# Patient Record
Sex: MALE | Race: WHITE | NOT HISPANIC OR LATINO | Employment: OTHER | URBAN - METROPOLITAN AREA
[De-identification: names, ages, dates, MRNs, and addresses within clinical notes are randomized per-mention and may not be internally consistent; named-entity substitution may affect disease eponyms.]

---

## 2017-05-12 ENCOUNTER — HOSPITAL ENCOUNTER (OUTPATIENT)
Dept: NEUROLOGY | Facility: HOSPITAL | Age: 72
Discharge: HOME | End: 2017-05-12
Attending: NURSE PRACTITIONER | Admitting: NURSE PRACTITIONER

## 2017-05-12 ENCOUNTER — OUTSIDE FACILITY SERVICE (OUTPATIENT)
Dept: NEUROLOGY | Facility: CLINIC | Age: 72
End: 2017-05-12

## 2017-05-12 PROCEDURE — 95886 MUSC TEST DONE W/N TEST COMP: CPT | Performed by: PSYCHIATRY & NEUROLOGY

## 2017-05-12 PROCEDURE — 95910 NRV CNDJ TEST 7-8 STUDIES: CPT | Performed by: PSYCHIATRY & NEUROLOGY

## 2017-11-01 ENCOUNTER — HOSPITAL ENCOUNTER (OUTPATIENT)
Dept: GENERAL RADIOLOGY | Facility: HOSPITAL | Age: 72
Discharge: HOME | End: 2017-11-01
Attending: INTERNAL MEDICINE | Admitting: INTERNAL MEDICINE

## 2019-01-01 ENCOUNTER — TELEPHONE (OUTPATIENT)
Dept: RHEUMATOLOGY | Facility: CLINIC | Age: 74
End: 2019-01-01

## 2019-01-01 ENCOUNTER — HOSPITAL ENCOUNTER (OUTPATIENT)
Dept: BONE DENSITY | Facility: HOSPITAL | Age: 74
End: 2019-01-01

## 2019-01-01 ENCOUNTER — HOSPITAL ENCOUNTER (OUTPATIENT)
Dept: BONE DENSITY | Facility: HOSPITAL | Age: 74
Discharge: HOME | End: 2019-11-27
Admitting: NURSE PRACTITIONER

## 2019-01-01 ENCOUNTER — LAB (OUTPATIENT)
Dept: LAB | Facility: HOSPITAL | Age: 74
End: 2019-01-01

## 2019-01-01 ENCOUNTER — CLINICAL SUPPORT NO REQUIREMENTS (OUTPATIENT)
Dept: CARDIOLOGY | Facility: CLINIC | Age: 74
End: 2019-01-01

## 2019-01-01 ENCOUNTER — OFFICE VISIT (OUTPATIENT)
Dept: CARDIOLOGY | Facility: CLINIC | Age: 74
End: 2019-01-01

## 2019-01-01 ENCOUNTER — OFFICE VISIT (OUTPATIENT)
Dept: RHEUMATOLOGY | Facility: CLINIC | Age: 74
End: 2019-01-01

## 2019-01-01 VITALS
OXYGEN SATURATION: 92 % | DIASTOLIC BLOOD PRESSURE: 59 MMHG | BODY MASS INDEX: 43.95 KG/M2 | HEIGHT: 68 IN | HEART RATE: 83 BPM | SYSTOLIC BLOOD PRESSURE: 118 MMHG | WEIGHT: 290 LBS

## 2019-01-01 VITALS
HEIGHT: 68 IN | WEIGHT: 284 LBS | HEART RATE: 80 BPM | SYSTOLIC BLOOD PRESSURE: 143 MMHG | BODY MASS INDEX: 43.04 KG/M2 | DIASTOLIC BLOOD PRESSURE: 88 MMHG

## 2019-01-01 DIAGNOSIS — I25.10 CORONARY ARTERY DISEASE INVOLVING NATIVE CORONARY ARTERY OF NATIVE HEART WITHOUT ANGINA PECTORIS: ICD-10-CM

## 2019-01-01 DIAGNOSIS — M19.90 INFLAMMATORY ARTHRITIS: ICD-10-CM

## 2019-01-01 DIAGNOSIS — N18.9 CHRONIC KIDNEY DISEASE, UNSPECIFIED CKD STAGE: ICD-10-CM

## 2019-01-01 DIAGNOSIS — Z95.0 PACEMAKER: ICD-10-CM

## 2019-01-01 DIAGNOSIS — I10 ESSENTIAL HYPERTENSION: ICD-10-CM

## 2019-01-01 DIAGNOSIS — M35.00 SJOGREN'S SYNDROME WITHOUT EXTRAGLANDULAR INVOLVEMENT (HCC): ICD-10-CM

## 2019-01-01 DIAGNOSIS — E78.2 MIXED HYPERLIPIDEMIA: ICD-10-CM

## 2019-01-01 DIAGNOSIS — Z13.820 SCREENING FOR OSTEOPOROSIS: ICD-10-CM

## 2019-01-01 DIAGNOSIS — Z95.0 PRESENCE OF CARDIAC PACEMAKER: ICD-10-CM

## 2019-01-01 DIAGNOSIS — I48.0 PAROXYSMAL ATRIAL FIBRILLATION (HCC): Primary | ICD-10-CM

## 2019-01-01 DIAGNOSIS — R60.9 FLUID RETENTION: ICD-10-CM

## 2019-01-01 DIAGNOSIS — E11.9 TYPE 2 DIABETES MELLITUS WITHOUT COMPLICATION, WITH LONG-TERM CURRENT USE OF INSULIN (HCC): ICD-10-CM

## 2019-01-01 DIAGNOSIS — M19.90 INFLAMMATORY ARTHRITIS: Primary | ICD-10-CM

## 2019-01-01 DIAGNOSIS — Z79.51 LONG TERM CURRENT USE OF INHALED STEROID: ICD-10-CM

## 2019-01-01 DIAGNOSIS — N28.9 RENAL INSUFFICIENCY: ICD-10-CM

## 2019-01-01 DIAGNOSIS — I49.5 SICK SINUS SYNDROME (HCC): ICD-10-CM

## 2019-01-01 DIAGNOSIS — Z79.4 TYPE 2 DIABETES MELLITUS WITHOUT COMPLICATION, WITH LONG-TERM CURRENT USE OF INSULIN (HCC): ICD-10-CM

## 2019-01-01 LAB
25(OH)D3 SERPL-MCNC: 56.2 NG/ML (ref 30–100)
ALBUMIN SERPL-MCNC: 3.9 G/DL (ref 3.5–5.2)
ALBUMIN/GLOB SERPL: 1.3 G/DL
ALP SERPL-CCNC: 46 U/L (ref 39–117)
ALT SERPL W P-5'-P-CCNC: 8 U/L (ref 1–41)
ANION GAP SERPL CALCULATED.3IONS-SCNC: 12.7 MMOL/L (ref 5–15)
AST SERPL-CCNC: 12 U/L (ref 1–40)
BASOPHILS # BLD AUTO: 0.01 10*3/MM3 (ref 0–0.2)
BASOPHILS NFR BLD AUTO: 0.2 % (ref 0–1.5)
BILIRUB SERPL-MCNC: 0.3 MG/DL (ref 0.2–1.2)
BILIRUB UR QL STRIP: NEGATIVE
BUN BLD-MCNC: 32 MG/DL (ref 8–23)
BUN/CREAT SERPL: 16.3 (ref 7–25)
C3 SERPL-MCNC: 129 MG/DL (ref 82–167)
C4 SERPL-MCNC: 25 MG/DL (ref 14–44)
CALCIUM SPEC-SCNC: 9.2 MG/DL (ref 8.6–10.5)
CCP IGA+IGG SERPL IA-ACNC: 7 UNITS (ref 0–19)
CHLORIDE SERPL-SCNC: 93 MMOL/L (ref 98–107)
CHROMATIN AB SERPL-ACNC: <10 IU/ML (ref 0–14)
CLARITY UR: CLEAR
CO2 SERPL-SCNC: 32.3 MMOL/L (ref 22–29)
COLOR UR: YELLOW
CREAT BLD-MCNC: 1.96 MG/DL (ref 0.76–1.27)
CRP SERPL-MCNC: 5.96 MG/DL (ref 0–0.5)
DEPRECATED RDW RBC AUTO: 47.2 FL (ref 37–54)
EOSINOPHIL # BLD AUTO: 0.13 10*3/MM3 (ref 0–0.4)
EOSINOPHIL NFR BLD AUTO: 3.1 % (ref 0.3–6.2)
ERYTHROCYTE [DISTWIDTH] IN BLOOD BY AUTOMATED COUNT: 13.8 % (ref 12.3–15.4)
ERYTHROCYTE [SEDIMENTATION RATE] IN BLOOD: 38 MM/HR (ref 0–20)
GFR SERPL CREATININE-BSD FRML MDRD: 34 ML/MIN/1.73
GLOBULIN UR ELPH-MCNC: 3.1 GM/DL
GLUCOSE BLD-MCNC: 600 MG/DL (ref 65–99)
GLUCOSE UR STRIP-MCNC: ABNORMAL MG/DL
HCT VFR BLD AUTO: 35.2 % (ref 37.5–51)
HGB BLD-MCNC: 10.7 G/DL (ref 13–17.7)
HGB UR QL STRIP.AUTO: NEGATIVE
IMM GRANULOCYTES # BLD AUTO: 0.01 10*3/MM3 (ref 0–0.05)
IMM GRANULOCYTES NFR BLD AUTO: 0.2 % (ref 0–0.5)
KETONES UR QL STRIP: NEGATIVE
LEUKOCYTE ESTERASE UR QL STRIP.AUTO: NEGATIVE
LYMPHOCYTES # BLD AUTO: 0.81 10*3/MM3 (ref 0.7–3.1)
LYMPHOCYTES NFR BLD AUTO: 19.1 % (ref 19.6–45.3)
MCH RBC QN AUTO: 28.4 PG (ref 26.6–33)
MCHC RBC AUTO-ENTMCNC: 30.4 G/DL (ref 31.5–35.7)
MCV RBC AUTO: 93.4 FL (ref 79–97)
MONOCYTES # BLD AUTO: 0.22 10*3/MM3 (ref 0.1–0.9)
MONOCYTES NFR BLD AUTO: 5.2 % (ref 5–12)
NEUTROPHILS # BLD AUTO: 3.05 10*3/MM3 (ref 1.7–7)
NEUTROPHILS NFR BLD AUTO: 72.2 % (ref 42.7–76)
NITRITE UR QL STRIP: NEGATIVE
NRBC BLD AUTO-RTO: 0 /100 WBC (ref 0–0.2)
PH UR STRIP.AUTO: 5.5 [PH] (ref 5–8)
PLATELET # BLD AUTO: 152 10*3/MM3 (ref 140–450)
PMV BLD AUTO: 10.5 FL (ref 6–12)
POTASSIUM BLD-SCNC: 4 MMOL/L (ref 3.5–5.2)
PROT SERPL-MCNC: 7 G/DL (ref 6–8.5)
PROT UR QL STRIP: NEGATIVE
RBC # BLD AUTO: 3.77 10*6/MM3 (ref 4.14–5.8)
SODIUM BLD-SCNC: 138 MMOL/L (ref 136–145)
SP GR UR STRIP: 1.02 (ref 1–1.03)
UROBILINOGEN UR QL STRIP: ABNORMAL
WBC NRBC COR # BLD: 4.23 10*3/MM3 (ref 3.4–10.8)

## 2019-01-01 PROCEDURE — 85025 COMPLETE CBC W/AUTO DIFF WBC: CPT

## 2019-01-01 PROCEDURE — 86200 CCP ANTIBODY: CPT

## 2019-01-01 PROCEDURE — 77080 DXA BONE DENSITY AXIAL: CPT

## 2019-01-01 PROCEDURE — 80053 COMPREHEN METABOLIC PANEL: CPT

## 2019-01-01 PROCEDURE — 93294 REM INTERROG EVL PM/LDLS PM: CPT | Performed by: INTERNAL MEDICINE

## 2019-01-01 PROCEDURE — 36415 COLL VENOUS BLD VENIPUNCTURE: CPT

## 2019-01-01 PROCEDURE — 86160 COMPLEMENT ANTIGEN: CPT

## 2019-01-01 PROCEDURE — 81003 URINALYSIS AUTO W/O SCOPE: CPT

## 2019-01-01 PROCEDURE — 86431 RHEUMATOID FACTOR QUANT: CPT

## 2019-01-01 PROCEDURE — 99214 OFFICE O/P EST MOD 30 MIN: CPT | Performed by: NURSE PRACTITIONER

## 2019-01-01 PROCEDURE — 85652 RBC SED RATE AUTOMATED: CPT

## 2019-01-01 PROCEDURE — 99214 OFFICE O/P EST MOD 30 MIN: CPT | Performed by: INTERNAL MEDICINE

## 2019-01-01 PROCEDURE — 86140 C-REACTIVE PROTEIN: CPT

## 2019-01-01 PROCEDURE — 93296 REM INTERROG EVL PM/IDS: CPT | Performed by: INTERNAL MEDICINE

## 2019-01-01 PROCEDURE — 82306 VITAMIN D 25 HYDROXY: CPT

## 2019-01-01 RX ORDER — OMEPRAZOLE 40 MG/1
40 CAPSULE, DELAYED RELEASE ORAL DAILY
COMMUNITY
Start: 2019-01-01

## 2019-01-01 RX ORDER — METOLAZONE 2.5 MG/1
2.5 TABLET ORAL DAILY
Refills: 0 | COMMUNITY
Start: 2019-11-05 | End: 2020-01-01

## 2019-07-19 PROBLEM — Z95.0 PRESENCE OF CARDIAC PACEMAKER: Status: ACTIVE | Noted: 2017-08-10

## 2019-07-19 PROBLEM — M81.0 OSTEOPOROSIS: Status: ACTIVE | Noted: 2017-09-27

## 2019-07-19 PROBLEM — D64.9 ANEMIA: Status: ACTIVE | Noted: 2019-01-21

## 2019-07-19 PROBLEM — R20.0 NUMBNESS OF EXTREMITY: Status: ACTIVE | Noted: 2017-03-27

## 2019-07-19 PROBLEM — I25.10 CORONARY ARTERY DISEASE: Status: ACTIVE | Noted: 2019-07-19

## 2019-07-19 PROBLEM — N18.9 CHRONIC KIDNEY DISEASE, UNSPECIFIED: Status: ACTIVE | Noted: 2017-05-30

## 2019-07-19 PROBLEM — M1A.9XX0 CHRONIC GOUT: Status: ACTIVE | Noted: 2018-07-25

## 2019-07-19 PROBLEM — M19.049 OSTEOARTHRITIS OF HAND: Status: ACTIVE | Noted: 2018-07-25

## 2019-07-19 PROBLEM — I10 HYPERTENSION: Status: ACTIVE | Noted: 2019-07-19

## 2019-07-19 RX ORDER — TOPIRAMATE 25 MG/1
TABLET ORAL EVERY 12 HOURS
COMMUNITY
Start: 2017-10-26 | End: 2020-01-01

## 2019-07-19 RX ORDER — ACETAMINOPHEN 500 MG
1 TABLET ORAL DAILY
COMMUNITY
Start: 2012-01-30

## 2019-07-19 RX ORDER — FOLIC ACID 1 MG/1
1 TABLET ORAL DAILY
COMMUNITY
Start: 2013-05-17

## 2019-07-19 RX ORDER — MULTIVITAMIN WITH IRON
100 TABLET ORAL DAILY
COMMUNITY
Start: 2017-08-10

## 2019-07-19 RX ORDER — ALBUTEROL SULFATE 90 UG/1
2 AEROSOL, METERED RESPIRATORY (INHALATION) EVERY 4 HOURS PRN
COMMUNITY
Start: 2015-12-01

## 2019-07-19 RX ORDER — BACLOFEN 10 MG/1
TABLET ORAL
COMMUNITY
Start: 2012-02-09 | End: 2020-01-01

## 2019-07-19 RX ORDER — HYDROXYCHLOROQUINE SULFATE 200 MG/1
TABLET, FILM COATED ORAL EVERY 12 HOURS
COMMUNITY
Start: 2018-03-23 | End: 2019-10-16 | Stop reason: SDUPTHER

## 2019-07-19 RX ORDER — HYDRALAZINE HYDROCHLORIDE 50 MG/1
50 TABLET, FILM COATED ORAL 3 TIMES DAILY
COMMUNITY
Start: 2016-04-29 | End: 2020-01-01 | Stop reason: HOSPADM

## 2019-07-19 RX ORDER — DOCUSATE SODIUM 100 MG/1
100 CAPSULE, LIQUID FILLED ORAL 2 TIMES DAILY PRN
COMMUNITY
Start: 2017-08-10

## 2019-07-19 RX ORDER — CALCITRIOL 0.25 UG/1
0.25 CAPSULE, LIQUID FILLED ORAL DAILY
COMMUNITY
Start: 2017-08-10 | End: 2020-01-01

## 2019-07-19 RX ORDER — PRAVASTATIN SODIUM 80 MG/1
TABLET ORAL EVERY 24 HOURS
COMMUNITY
Start: 2018-04-25 | End: 2019-08-12 | Stop reason: SDUPTHER

## 2019-07-19 RX ORDER — BLOOD SUGAR DIAGNOSTIC
STRIP MISCELLANEOUS
COMMUNITY
Start: 2014-04-22 | End: 2020-01-01

## 2019-07-19 RX ORDER — INSULIN GLARGINE 100 [IU]/ML
80 INJECTION, SOLUTION SUBCUTANEOUS EVERY MORNING
Status: ON HOLD | COMMUNITY
Start: 2013-05-06 | End: 2020-01-01 | Stop reason: SDUPTHER

## 2019-07-19 RX ORDER — HYDROCODONE BITARTRATE AND ACETAMINOPHEN 10; 325 MG/1; MG/1
TABLET ORAL
COMMUNITY
Start: 2015-04-03 | End: 2020-01-01

## 2019-07-19 RX ORDER — LISINOPRIL 20 MG/1
TABLET ORAL EVERY 12 HOURS
COMMUNITY
Start: 2017-08-29 | End: 2020-01-01

## 2019-07-19 RX ORDER — POTASSIUM CHLORIDE 750 MG/1
TABLET, FILM COATED, EXTENDED RELEASE ORAL
COMMUNITY
Start: 2012-11-26 | End: 2020-01-01

## 2019-07-19 RX ORDER — LEVOTHYROXINE SODIUM 0.05 MG/1
50 TABLET ORAL DAILY
COMMUNITY
Start: 2012-02-09

## 2019-07-19 RX ORDER — CYCLOSPORINE 0.5 MG/ML
EMULSION OPHTHALMIC
COMMUNITY
Start: 2013-10-01 | End: 2020-01-01

## 2019-07-19 RX ORDER — FLUOXETINE 10 MG/1
20 CAPSULE ORAL DAILY
COMMUNITY
Start: 2011-12-27

## 2019-07-19 RX ORDER — TAMSULOSIN HYDROCHLORIDE 0.4 MG/1
1 CAPSULE ORAL DAILY
COMMUNITY
Start: 2012-08-02

## 2019-07-19 RX ORDER — GABAPENTIN 400 MG/1
400 CAPSULE ORAL 2 TIMES DAILY
COMMUNITY
Start: 2012-06-25

## 2019-07-19 RX ORDER — DIAZEPAM 5 MG/1
5 TABLET ORAL EVERY 8 HOURS PRN
COMMUNITY
Start: 2014-04-22

## 2019-07-19 RX ORDER — BUMETANIDE 2 MG/1
TABLET ORAL
COMMUNITY
Start: 2013-05-03 | End: 2020-01-01

## 2019-07-22 ENCOUNTER — OFFICE VISIT (OUTPATIENT)
Dept: RHEUMATOLOGY | Facility: CLINIC | Age: 74
End: 2019-07-22

## 2019-07-22 ENCOUNTER — LAB (OUTPATIENT)
Dept: LAB | Facility: HOSPITAL | Age: 74
End: 2019-07-22

## 2019-07-22 VITALS
DIASTOLIC BLOOD PRESSURE: 80 MMHG | SYSTOLIC BLOOD PRESSURE: 136 MMHG | WEIGHT: 280 LBS | HEIGHT: 68 IN | HEART RATE: 73 BPM | BODY MASS INDEX: 42.44 KG/M2

## 2019-07-22 DIAGNOSIS — M19.90 INFLAMMATORY ARTHRITIS: Primary | ICD-10-CM

## 2019-07-22 DIAGNOSIS — M19.90 INFLAMMATORY ARTHRITIS: ICD-10-CM

## 2019-07-22 DIAGNOSIS — N18.9 CHRONIC KIDNEY DISEASE, UNSPECIFIED CKD STAGE: ICD-10-CM

## 2019-07-22 DIAGNOSIS — M35.00 SJOGREN'S SYNDROME WITHOUT EXTRAGLANDULAR INVOLVEMENT (HCC): ICD-10-CM

## 2019-07-22 LAB
25(OH)D3 SERPL-MCNC: 89.1 NG/ML (ref 30–100)
ALBUMIN SERPL-MCNC: 3.3 G/DL (ref 3.5–4.8)
ALBUMIN/GLOB SERPL: 1.3 G/DL (ref 1–1.7)
ALP SERPL-CCNC: 31 U/L (ref 32–91)
ALT SERPL W P-5'-P-CCNC: 12 U/L (ref 17–63)
ANION GAP SERPL CALCULATED.3IONS-SCNC: 12.6 MMOL/L (ref 5–15)
AST SERPL-CCNC: 17 U/L (ref 15–41)
BASOPHILS # BLD AUTO: 0 10*3/MM3 (ref 0–0.2)
BASOPHILS NFR BLD AUTO: 0.7 % (ref 0–1.5)
BILIRUB SERPL-MCNC: 0.5 MG/DL (ref 0.3–1.2)
BUN BLD-MCNC: 14 MG/DL (ref 8–20)
BUN/CREAT SERPL: 10.8 (ref 6.2–20.3)
CALCIUM SPEC-SCNC: 8.8 MG/DL (ref 8.9–10.3)
CHLORIDE SERPL-SCNC: 103 MMOL/L (ref 101–111)
CO2 SERPL-SCNC: 28 MMOL/L (ref 22–32)
CREAT BLD-MCNC: 1.3 MG/DL (ref 0.7–1.2)
CRP SERPL-MCNC: 2.73 MG/DL (ref 0–0.7)
DEPRECATED RDW RBC AUTO: 48.1 FL (ref 37–54)
EOSINOPHIL # BLD AUTO: 0.2 10*3/MM3 (ref 0–0.4)
EOSINOPHIL NFR BLD AUTO: 3.4 % (ref 0.3–6.2)
ERYTHROCYTE [DISTWIDTH] IN BLOOD BY AUTOMATED COUNT: 15 % (ref 12.3–15.4)
ERYTHROCYTE [SEDIMENTATION RATE] IN BLOOD: 55 MM/HR (ref 0–20)
GFR SERPL CREATININE-BSD FRML MDRD: 54 ML/MIN/1.73
GLOBULIN UR ELPH-MCNC: 2.6 GM/DL (ref 2.5–3.8)
GLUCOSE BLD-MCNC: 90 MG/DL (ref 65–99)
HCT VFR BLD AUTO: 31.6 % (ref 37.5–51)
HGB BLD-MCNC: 10.5 G/DL (ref 13–17.7)
LYMPHOCYTES # BLD AUTO: 1.1 10*3/MM3 (ref 0.7–3.1)
LYMPHOCYTES NFR BLD AUTO: 21.7 % (ref 19.6–45.3)
MCH RBC QN AUTO: 29.8 PG (ref 26.6–33)
MCHC RBC AUTO-ENTMCNC: 33.2 G/DL (ref 31.5–35.7)
MCV RBC AUTO: 89.7 FL (ref 79–97)
MONOCYTES # BLD AUTO: 0.3 10*3/MM3 (ref 0.1–0.9)
MONOCYTES NFR BLD AUTO: 6.4 % (ref 5–12)
NEUTROPHILS # BLD AUTO: 3.5 10*3/MM3 (ref 1.7–7)
NEUTROPHILS NFR BLD AUTO: 67.8 % (ref 42.7–76)
PLATELET # BLD AUTO: 201 10*3/MM3 (ref 140–450)
PMV BLD AUTO: 7.5 FL (ref 6–12)
POTASSIUM BLD-SCNC: 3.6 MMOL/L (ref 3.6–5.1)
PROT SERPL-MCNC: 5.9 G/DL (ref 6.1–7.9)
RBC # BLD AUTO: 3.53 10*6/MM3 (ref 4.14–5.8)
SODIUM BLD-SCNC: 140 MMOL/L (ref 136–144)
WBC NRBC COR # BLD: 5.2 10*3/MM3 (ref 3.4–10.8)

## 2019-07-22 PROCEDURE — 36415 COLL VENOUS BLD VENIPUNCTURE: CPT

## 2019-07-22 PROCEDURE — 85027 COMPLETE CBC AUTOMATED: CPT | Performed by: NURSE PRACTITIONER

## 2019-07-22 PROCEDURE — 82306 VITAMIN D 25 HYDROXY: CPT | Performed by: NURSE PRACTITIONER

## 2019-07-22 PROCEDURE — 84165 PROTEIN E-PHORESIS SERUM: CPT | Performed by: NURSE PRACTITIONER

## 2019-07-22 PROCEDURE — 99213 OFFICE O/P EST LOW 20 MIN: CPT | Performed by: NURSE PRACTITIONER

## 2019-07-22 PROCEDURE — 85652 RBC SED RATE AUTOMATED: CPT | Performed by: NURSE PRACTITIONER

## 2019-07-22 PROCEDURE — 80053 COMPREHEN METABOLIC PANEL: CPT | Performed by: NURSE PRACTITIONER

## 2019-07-22 PROCEDURE — 86140 C-REACTIVE PROTEIN: CPT | Performed by: NURSE PRACTITIONER

## 2019-07-22 RX ORDER — MONTELUKAST SODIUM 10 MG/1
10 TABLET ORAL EVERY EVENING
Refills: 6 | COMMUNITY
Start: 2019-07-06 | End: 2020-01-01

## 2019-07-22 RX ORDER — AZELASTINE HYDROCHLORIDE 0.5 MG/ML
SOLUTION/ DROPS OPHTHALMIC
Refills: 11 | COMMUNITY
Start: 2019-06-23 | End: 2020-01-01

## 2019-07-22 RX ORDER — HYDROCODONE BITARTRATE AND ACETAMINOPHEN 7.5; 325 MG/1; MG/1
1 TABLET ORAL EVERY 6 HOURS PRN
Refills: 0 | COMMUNITY
Start: 2019-07-08 | End: 2020-01-01

## 2019-07-22 RX ORDER — PYRIDOSTIGMINE BROMIDE 60 MG/1
30 TABLET ORAL 2 TIMES DAILY
Refills: 3 | COMMUNITY
Start: 2019-06-27 | End: 2020-01-01 | Stop reason: HOSPADM

## 2019-07-22 RX ORDER — ERGOCALCIFEROL 1.25 MG/1
50000 CAPSULE ORAL
COMMUNITY
Start: 2019-07-21

## 2019-07-22 NOTE — PATIENT INSTRUCTIONS
Labs today  Hand xray  Continue plaquenil, Discussed the importance of eye examination with plaquenil use. Pt verbalizes understanding.   RTO 4 months

## 2019-07-22 NOTE — PROGRESS NOTES
"Subjective   Zack Warner is a 74 y.o. male.     History of Present Illness   Pt is a 74 y.o. Male with history of inflammatory arthritis,Sjogrens, carpal tunnel syndrome, chronic kidney disease, diabetes and anemia. He was last seen in the office by Dr. Jones in January 2019.  Labs in January showed elevated glucose at 346, creatinine 1.8 (0.6-1.3), Hgb at 11.4.    Normal dexa scan on 11/2017.    Pt is currently taking plaquenil 400 mg/d and is up to date on eye exam. For pain control he takes hydrocodone and gabapentin. Stiffness lasts \"all day\" his hands ache, especially over the knuckles. His ankles swell and he takes a diuretic as needed for this.     ROS: denies fever. +chills intermittently. +dry eyes and uses gtts as needed. Denies sores in the mouth or nose. Denies N/V/D. Will have intermittent CP and SOA; pt has CAD and is followed by cardiology. Denies skin rashes or PS. Denies DAVID, jaw pain or blurred vision. He does have frontal headache and reports that he was hospitalized for this in April and was told the headache was related to his diabetes.  The remainder of the review of systems reviewed and are (-).     The following portions of the patient's history were reviewed and updated as appropriate: allergies, current medications, past family history, past medical history, past social history, past surgical history and problem list.    Review of Systems   Constitutional:        See HPI       Objective   Physical Exam   Constitutional: He is oriented to person, place, and time. He appears well-developed and well-nourished.   Pt uses walker with ambulation.   HENT:   Head: Normocephalic and atraumatic.   Nose: Nose normal.   Eyes: EOM are normal. Pupils are equal, round, and reactive to light.   Neck:   No bruits   Cardiovascular: Normal rate.   Pt has pacemaker   Pulmonary/Chest: Effort normal and breath sounds normal.   Musculoskeletal:   Decreased ROM of the nila shoulders, lumbar spine, nila MCPs  Pt " has tenderness over the lumbar spine, bilateral MCPs, PIPs  Swelling is noted over the bilateral ankles.   Neurological: He is alert and oriented to person, place, and time.   Skin: Skin is warm and dry.         Assessment/Plan   Zack was seen today for joint pain.    Diagnoses and all orders for this visit:    Inflammatory arthritis  Comments:  Continue plaquenil 400 mg/d  Labs today  Hand xray today  Orders:  -     CBC With Manual Differential; Future  -     Comprehensive Metabolic Panel; Future  -     C-reactive Protein; Future  -     Sedimentation Rate; Future  -     Protein Elec + Interp, Serum; Future  -     Vitamin D 25 Hydroxy; Future  -     XR Hand 2 View Bilateral    Sjogren's syndrome without extraglandular involvement (CMS/HCC)  Comments:  Continue with eye gtts as needed    Chronic kidney disease, unspecified CKD stage  Comments:  Pt is under the care of nephrologist: Dr. Andrade  Orders:  -     CBC With Manual Differential; Future  -     Comprehensive Metabolic Panel; Future  -     C-reactive Protein; Future  -     Sedimentation Rate; Future  -     Protein Elec + Interp, Serum; Future  -     Vitamin D 25 Hydroxy; Future  -     XR Hand 2 View Bilateral        Patient Instructions   Labs today  Hand xray  Continue plaquenil, Discussed the importance of eye examination with plaquenil use. Pt verbalizes understanding.   RTO 4 months

## 2019-07-23 LAB
ALBUMIN SERPL-MCNC: 3.1 G/DL (ref 2.9–4.4)
ALBUMIN/GLOB SERPL: 1.1 {RATIO} (ref 0.7–1.7)
ALPHA1 GLOB FLD ELPH-MCNC: 0.3 G/DL (ref 0–0.4)
ALPHA2 GLOB SERPL ELPH-MCNC: 0.8 G/DL (ref 0.4–1)
B-GLOBULIN SERPL ELPH-MCNC: 1 G/DL (ref 0.7–1.3)
GAMMA GLOB SERPL ELPH-MCNC: 0.6 G/DL (ref 0.4–1.8)
GLOBULIN SER CALC-MCNC: 2.7 G/DL (ref 2.2–3.9)
Lab: ABNORMAL
M-SPIKE: ABNORMAL G/DL
PROT PATTERN SERPL ELPH-IMP: ABNORMAL
PROT SERPL-MCNC: 5.8 G/DL (ref 6–8.5)

## 2019-08-12 RX ORDER — PRAVASTATIN SODIUM 80 MG/1
TABLET ORAL
Qty: 90 TABLET | Refills: 1 | Status: SHIPPED | OUTPATIENT
Start: 2019-08-12 | End: 2020-01-01

## 2019-08-19 ENCOUNTER — TELEPHONE (OUTPATIENT)
Dept: RHEUMATOLOGY | Facility: CLINIC | Age: 74
End: 2019-08-19

## 2019-08-19 DIAGNOSIS — M19.90 INFLAMMATORY ARTHRITIS: Primary | ICD-10-CM

## 2019-08-23 RX ORDER — APIXABAN 5 MG/1
TABLET, FILM COATED ORAL
Qty: 180 TABLET | Refills: 2 | Status: SHIPPED | OUTPATIENT
Start: 2019-08-23 | End: 2020-01-01

## 2019-09-09 ENCOUNTER — CLINICAL SUPPORT NO REQUIREMENTS (OUTPATIENT)
Dept: CARDIOLOGY | Facility: CLINIC | Age: 74
End: 2019-09-09

## 2019-09-09 DIAGNOSIS — I48.0 PAROXYSMAL ATRIAL FIBRILLATION (HCC): Primary | ICD-10-CM

## 2019-09-09 DIAGNOSIS — Z95.0 PRESENCE OF CARDIAC PACEMAKER: ICD-10-CM

## 2019-09-09 DIAGNOSIS — I49.5 SICK SINUS SYNDROME (HCC): ICD-10-CM

## 2019-09-09 PROCEDURE — 93288 INTERROG EVL PM/LDLS PM IP: CPT | Performed by: INTERNAL MEDICINE

## 2019-10-16 RX ORDER — HYDROXYCHLOROQUINE SULFATE 200 MG/1
TABLET, FILM COATED ORAL
Qty: 60 TABLET | Refills: 2 | Status: SHIPPED | OUTPATIENT
Start: 2019-10-16 | End: 2020-01-01

## 2019-10-16 NOTE — TELEPHONE ENCOUNTER
Requested Prescriptions     Pending Prescriptions Disp Refills   • hydroxychloroquine (PLAQUENIL) 200 MG tablet [Pharmacy Med Name: HYDROXYCHLOROQUINE 200 MG TAB] 60 tablet 5     Sig: TAKE 1 TABLET BY MOUTH TWICE A DAY     Pt last seen 7/22/19  Elevated CRP and ESR- advised to continuse with plaquenil.   Follow up 11/20/19

## 2019-11-20 PROBLEM — I50.9 CONGESTIVE HEART FAILURE (HCC): Status: ACTIVE | Noted: 2019-01-01

## 2019-11-20 PROBLEM — I25.10 CORONARY ARTERIOSCLEROSIS: Status: ACTIVE | Noted: 2019-01-01

## 2019-11-20 PROBLEM — E11.8 DISORDER ASSOCIATED WITH TYPE 2 DIABETES MELLITUS (HCC): Status: ACTIVE | Noted: 2019-01-01

## 2019-11-20 PROBLEM — K62.5 RECTAL HEMORRHAGE: Status: ACTIVE | Noted: 2019-01-01

## 2019-11-20 PROBLEM — E21.3 HYPERPARATHYROIDISM (HCC): Status: ACTIVE | Noted: 2019-01-01

## 2019-11-20 PROBLEM — E11.9 DIABETES MELLITUS (HCC): Status: ACTIVE | Noted: 2019-01-01

## 2019-11-20 PROBLEM — N18.30 CHRONIC RENAL INSUFFICIENCY, STAGE III (MODERATE) (HCC): Status: ACTIVE | Noted: 2019-01-01

## 2019-11-20 PROBLEM — K64.8 BLEEDING INTERNAL HEMORRHOIDS: Status: ACTIVE | Noted: 2019-01-01

## 2019-11-20 PROBLEM — K58.9 IRRITABLE BOWEL SYNDROME: Status: ACTIVE | Noted: 2019-01-01

## 2019-11-20 PROBLEM — K21.9 GASTROESOPHAGEAL REFLUX DISEASE WITH ULCERATION: Status: ACTIVE | Noted: 2019-01-01

## 2019-11-20 PROBLEM — R19.8 IRREGULAR BOWEL HABITS: Status: ACTIVE | Noted: 2019-01-01

## 2019-11-20 PROBLEM — J44.9 CHRONIC OBSTRUCTIVE LUNG DISEASE (HCC): Status: ACTIVE | Noted: 2019-01-01

## 2019-11-20 PROBLEM — D50.0 ANEMIA DUE TO BLOOD LOSS: Status: ACTIVE | Noted: 2019-01-01

## 2019-11-20 PROBLEM — I50.22 CHRONIC SYSTOLIC HEART FAILURE (HCC): Status: ACTIVE | Noted: 2019-01-01

## 2019-11-20 PROBLEM — R53.1 ATTACKS OF WEAKNESS: Status: ACTIVE | Noted: 2019-01-01

## 2019-11-20 NOTE — PATIENT INSTRUCTIONS
Continue plaquenil  Discussed the importance of eye examination with plaquenil use. Pt verbalizes understanding.   Vision exam- will obtain records for review.  Labs today   RTO 4-5 months

## 2019-11-20 NOTE — PROGRESS NOTES
Subjective   Zack Warner is a 74 y.o. male.     History of Present Illness   Pt is a 74 y.o. Male here for follow up today for his Inflammatory arthritis and sjogrens. He also has chronic kidney disease, diabetes and anemia. He claims he sees his PCP for diabetes.He was last seen in the office in July. Labs in July showed elevated CRP at 2.73 (0-0.7), ESR at 55 (0-20), CBC showed anemia at 10.5  X-ray in July showed features of osteoarthritis.     Normal dexa scan on 11/2017. Has fell a few times since seen last, denies any fractures.     Pt is currently taking plaquenil 200 mg/d and is up to date on eye exam. He claims he only takes 1 plaquenil/d---he is not sure why.  For pain control he takes hydrocodone and gabapentin.  Reports that w/ his multiple lumbar spine surgeries he has chronic pain. His PCP manages.  Stiffness will vary day to day (worse w/ weather changes) but recently started a new medication per his PCP that helped w/ his fluid retention. He reports losing 30 lbs & is feeling much better since having all of the fluid off. Is moving easier.     ROS: denies fever or chills. +dry eyes and uses gtts as needed. Denies sores in the mouth or nose. Denies N/V/D. Will have intermittent CP and SOA; pt has CAD and is followed by cardiology. Has a pacemaker;sees electrophysiologist for this for PM checks. Denies skin rashes or PS. Denies DAVID, jaw pain or blurred vision.   The remainder of the review of systems reviewed and are (-).        The following portions of the patient's history were reviewed and updated as appropriate: allergies, current medications, past family history, past medical history, past social history, past surgical history and problem list.    Review of Systems  See HPI    Objective   Physical Exam   Constitutional: He is oriented to person, place, and time. He appears well-developed and well-nourished.   HENT:   Head: Normocephalic.   Nose: Nose normal.   Eyes: Conjunctivae are normal.  Pupils are equal, round, and reactive to light.   Cardiovascular: Normal rate and regular rhythm.   Pulmonary/Chest: Effort normal and breath sounds normal. He has no wheezes.   Musculoskeletal:   He comes in today using his cane w/ ambulation. He has slow guarded movements. Decreased ROM of the bilateral shoulders, elbow and MCPs. He has tenderness over his lumbar spine which is chronic  No synovitis. He has some generalized fluid retention over the bilateral ankles & feet   Neurological: He is alert and oriented to person, place, and time.   Skin: Skin is dry. No rash noted.   Psychiatric: He has a normal mood and affect.   Vitals reviewed.        Assessment/Plan   Zack was seen today for joint pain.    Diagnoses and all orders for this visit:    Inflammatory arthritis  Comments:  Labs today  Continue w/ plaquenil--will get copy of eye exam from [facility]  Orders:  -     CBC & Differential; Future  -     Comprehensive Metabolic Panel; Future  -     Sedimentation Rate; Future  -     C-reactive Protein; Future  -     Cyclic Citrul Peptide Antibody, IgG / IgA; Future  -     Rheumatoid Factor; Future  -     C3 Complement; Future  -     C4 Complement; Future  -     Urinalysis With Culture If Indicated -; Future  -     Vitamin D 25 Hydroxy; Future  -     DEXA Bone Density Axial; Future    Sjogren's syndrome without extraglandular involvement (CMS/HCC)  Comments:  Continue with eye gtts as needed.  Orders:  -     CBC & Differential; Future  -     Comprehensive Metabolic Panel; Future  -     Sedimentation Rate; Future  -     C-reactive Protein; Future  -     Cyclic Citrul Peptide Antibody, IgG / IgA; Future  -     Rheumatoid Factor; Future  -     C3 Complement; Future  -     C4 Complement; Future  -     Urinalysis With Culture If Indicated -; Future  -     Vitamin D 25 Hydroxy; Future  -     DEXA Bone Density Axial; Future    Chronic kidney disease, unspecified CKD stage  Comments:  Pt aware to avoid NSAIDs--labs  today & will send to his PCP for review.    Screening for osteoporosis  Comments:  New order for dexa scan given  Orders:  -     DEXA Bone Density Axial; Future    Fluid retention  Comments:  Improvement w/ new therapy per his PCP        Patient Instructions   Continue plaquenil  Discussed the importance of eye examination with plaquenil use. Pt verbalizes understanding.   Vision exam- will obtain records for review.  Labs today   RTO 4-5 months

## 2019-11-21 NOTE — TELEPHONE ENCOUNTER
Spoke with patient and then spoke with PCP Medical Assistant. They agreed that he should go to ER and be checked out. Patient stated sugar yesterday was 500 but today is was 298, then said it was 198. Not sure which or if he was confused. They stated his a1c was 1 a month ago.Notified patient to go to ER and he stated he would call his wife. Called lab about the no call on the critical lab and Kishor said she would check into why we weren't notified and let me know.

## 2019-11-21 NOTE — TELEPHONE ENCOUNTER
----- Message from REAGAN Teran sent at 11/21/2019  8:56 AM EST -----  Glucose is greater than 600 please notify the pt of this immediately and have him recheck his glucose & if still high needs to be evaluated ASAP.  Please fax all labs to his PCP. Thanks

## 2019-11-22 NOTE — TELEPHONE ENCOUNTER
Noted, thank you. Yes we were never called on this. I spoke w/ Dr. MONTOYA & she reports she wasn't called either.

## 2019-11-25 NOTE — TELEPHONE ENCOUNTER
Lab stated that this information was given to the  and was informed that the office was very upset about not being notified and she stated she would take care of it..will see

## 2019-12-11 NOTE — PROGRESS NOTES
"    Subjective:     Encounter Date:12/12/2019      Patient ID: Zack Warner is a 74 y.o. male.    Chief Complaint: Follow-up for A-Fib & CAD  History of Present Illness     74 -year-old white male patient with known history of diabetes hypertension CAD dyslipidemia and atrial flutter/fibrillation comes back for follow up.   Patient underwent permanent pacemaker placement because of atrial flutter and very slow ventricular rate         patient underwent cardiac catheterization August 2016, showed 55% mid LAD disease, 50% lateral branch disease, less than 50% RCA disease          previously it was noted his BNP was only 69   most of the time he is having only lower extremity edema with venous stasis   echocardiogram March 2018 showed EF 50% RV size is enlarged no significant pulmonary hypertension no significant Doppler abnormalities     Advised tight stockings and elevation of the lower extremity    unfortunately patient is not able to control the sleep apnea   continue anti coagulation   pacemaker needs to be interrogated regularly   he is taking diuretics as needed for swelling  Labs showed lipids within normal limit creatinine is 1.8 and patient will be followed by Nephrology   patient was in the hospital April 2019 had an Myla scan stress Myoview which showed no ischemia reverse redistribution noted in the inferoseptal and apical walls EF 45%   follow-up in 6 monthsWith labs and EKG  EKG today showedAV pacer rhythm      Past Medical History:   Diagnosis Date   • Chronic kidney disease    • Sjogren's syndrome (CMS/HCC)      Past Surgical History:   Procedure Laterality Date   • BACK SURGERY     • CATARACT EXTRACTION, BILATERAL     • HIP SURGERY  04/2012    total hip arthroplasty Rt hip   • LAPAROSCOPIC CHOLECYSTECTOMY     • NECK SURGERY     • TOTAL THYROIDECTOMY       /59 (BP Location: Right arm, Patient Position: Sitting, Cuff Size: Adult)   Pulse 83   Ht 172.7 cm (68\")   Wt 132 kg (290 lb)   SpO2 " "92%   BMI 44.09 kg/m²   Family History   Problem Relation Age of Onset   • Hypertension Mother    • Heart disease Mother    • Alzheimer's disease Father    • Heart disease Brother    • Hypertension Daughter    • Diabetes Daughter    • Heart disease Brother    • Arthritis Brother    • No Known Problems Brother    • No Known Problems Brother        Current Outpatient Medications:   •  calcitriol (ROCALTROL) 0.25 MCG capsule, CALCITRIOL 0.25 MCG CAPS, Disp: , Rfl:   •  Calcium Carbonate-Vit D-Min (CALCIUM 1200) 6768-1630 MG-UNIT chewable tablet, CALCIUM 1200 3888-3826 MG-UNIT CHEW, Disp: , Rfl:   •  Cholecalciferol 2000 units capsule, VITAMIN D2 CAPS (CHOLECALCIFEROL CAPS) 1.25MG, Disp: , Rfl:   •  cycloSPORINE (RESTASIS) 0.05 % ophthalmic emulsion, RESTASIS 0.05 % EMUL, Disp: , Rfl:   •  diazePAM (VALIUM) 5 MG tablet, DIAZEPAM 5 MG TABS, Disp: , Rfl:   •  docusate sodium (CVS STOOL SOFTENER) 100 MG capsule, CVS STOOL SOFTENER 100 MG CAPS, Disp: , Rfl:   •  ELIQUIS 5 MG tablet tablet, TAKE 1 TABLET BY MOUTH TWICE A DAY, Disp: 180 tablet, Rfl: 2  •  FLUoxetine (PROzac) 10 MG capsule, FLUOXETINE HCL 10 MG CAPS, Disp: , Rfl:   •  folic acid (FOLVITE) 1 MG tablet, FOLIC ACID 1 MG TABS, Disp: , Rfl:   •  gabapentin (NEURONTIN) 400 MG capsule, GABAPENTIN 400 MG CAPS, Disp: , Rfl:   •  glucose blood (CONTOUR TEST) test strip, CONTOUR TEST STRP, Disp: , Rfl:   •  hydrALAZINE (APRESOLINE) 50 MG tablet, HYDRALAZINE HCL 50 MG TABS, Disp: , Rfl:   •  HYDROcodone-acetaminophen (NORCO)  MG per tablet, HYDROCODONE-ACETAMINOPHEN  MG TABS, Disp: , Rfl:   •  hydroxychloroquine (PLAQUENIL) 200 MG tablet, TAKE 1 TABLET BY MOUTH TWICE A DAY, Disp: 60 tablet, Rfl: 2  •  insulin glargine (LANTUS) 100 UNIT/ML injection, LANTUS 100 UNIT/ML SOLN, Disp: , Rfl:   •  insulin lispro (HUMALOG) 100 UNIT/ML injection, HUMALOG 100 UNIT/ML SOLN, Disp: , Rfl:   •  Insulin Syringe-Needle U-100 (BD INSULIN SYRINGE U/F) 31G X 5/16\" 0.3 ML misc, " "BD INSULIN SYRINGE U/F 31G X 5/16\" 0.3 ML, Disp: , Rfl:   •  levothyroxine (SYNTHROID, LEVOTHROID) 50 MCG tablet, LEVOTHYROXINE SODIUM 50 MCG TABS, Disp: , Rfl:   •  metOLazone (ZAROXOLYN) 2.5 MG tablet, Take 2.5 mg by mouth Daily., Disp: , Rfl: 0  •  montelukast (SINGULAIR) 10 MG tablet, Take 10 mg by mouth Every Evening., Disp: , Rfl: 6  •  Multiple Vitamins-Minerals (MULTI VITAMIN/MINERALS) tablet, MULTI VITAMIN/MINERALS TABS, Disp: , Rfl:   •  omeprazole (priLOSEC) 40 MG capsule, , Disp: , Rfl:   •  potassium chloride (KLOR-CON) 10 MEQ CR tablet, , Disp: , Rfl:   •  pravastatin (PRAVACHOL) 80 MG tablet, TAKE 1 TABLET EVERY DAY, Disp: 90 tablet, Rfl: 1  •  pyridostigmine (MESTINON) 60 MG tablet, Take 30 mg by mouth 2 (Two) Times a Day., Disp: , Rfl: 3  •  tamsulosin (FLOMAX) 0.4 MG capsule 24 hr capsule, FLOMAX 0.4 MG CAPS, Disp: , Rfl:   •  vitamin B-6 (PYRIDOXINE) 100 MG tablet, VITAMIN B-6 100 MG TABS, Disp: , Rfl:   •  vitamin D (ERGOCALCIFEROL) 30537 units capsule capsule, , Disp: , Rfl:   •  albuterol sulfate HFA (PROAIR HFA) 108 (90 Base) MCG/ACT inhaler, PROAIR  (90 Base) MCG/ACT AERS, Disp: , Rfl:   •  azelastine (OPTIVAR) 0.05 % ophthalmic solution, INSTILL 1 DROP INTO BOTH EYES TWICE A DAY, Disp: , Rfl: 11  •  baclofen (LIORESAL) 10 MG tablet, BACLOFEN 10 MG TABS, Disp: , Rfl:   •  Blood Glucose Monitoring Suppl (KOKO CONTOUR MONITOR) w/Device kit, KOKO CONTOUR MONITOR w/Device KIT, Disp: , Rfl:   •  bumetanide (BUMEX) 2 MG tablet, BUMETANIDE 2 MG TABS, Disp: , Rfl:   •  HYDROcodone-acetaminophen (NORCO) 7.5-325 MG per tablet, Take 1 tablet by mouth Every 6 (Six) Hours As Needed. for pain, Disp: , Rfl: 0  •  lisinopril (PRINIVIL,ZESTRIL) 20 MG tablet, Every 12 (Twelve) Hours., Disp: , Rfl:   •  topiramate (TOPAMAX) 25 MG tablet, Every 12 (Twelve) Hours., Disp: , Rfl:   Social History     Socioeconomic History   • Marital status:      Spouse name: Not on file   • Number of children: Not " on file   • Years of education: Not on file   • Highest education level: Not on file   Tobacco Use   • Smoking status: Former Smoker     Types: Cigars   • Smokeless tobacco: Former User     Types: Chew   Substance and Sexual Activity   • Alcohol use: No     Frequency: Never   • Drug use: No     No Known Allergies  Review of Systems   Constitution: Positive for malaise/fatigue. Negative for fever.   HENT: Positive for congestion and hearing loss.    Eyes: Positive for double vision. Negative for visual disturbance.   Cardiovascular: Positive for dyspnea on exertion and leg swelling. Negative for claudication and syncope.   Respiratory: Positive for shortness of breath. Negative for cough.    Endocrine: Positive for cold intolerance.   Skin: Positive for rash. Negative for color change.   Musculoskeletal: Positive for arthritis and joint pain.   Gastrointestinal: Negative for abdominal pain and heartburn.   Genitourinary: Negative for hematuria.   Neurological: Positive for excessive daytime sleepiness and dizziness.   Psychiatric/Behavioral: Positive for depression. The patient is nervous/anxious.    All other systems reviewed and are negative.             Objective:     Physical Exam   Constitutional: He is oriented to person, place, and time. He appears well-developed and well-nourished. He is cooperative.   Morbidly obese   HENT:   Head: Normocephalic and atraumatic.   Mouth/Throat: Uvula is midline and oropharynx is clear and moist. No oral lesions.   Eyes: Conjunctivae are normal. No scleral icterus.   Neck: Trachea normal. Neck supple. Carotid bruit is not present. No thyromegaly present.   Cardiovascular: Normal rate, regular rhythm, S1 normal, S2 normal, normal heart sounds, intact distal pulses and normal pulses. PMI is not displaced. Exam reveals no gallop and no friction rub.   No murmur heard.  Pulmonary/Chest: Effort normal and breath sounds normal.   Abdominal: Soft. Bowel sounds are normal.    Musculoskeletal: Normal range of motion. He exhibits edema.   Neurological: He is alert and oriented to person, place, and time. He has normal strength.   No focal deficits   Skin: Skin is warm. No cyanosis.   Chronic venous stasis with skin changes noted stasis dermatitis   Psychiatric: He has a normal mood and affect.       Procedures    Lab Review:       Assessment:          Diagnosis Plan   1. Paroxysmal atrial fibrillation (CMS/[facility])     2. Coronary artery disease involving native coronary artery of native heart without angina pectoris     3. Mixed hyperlipidemia     4. Essential hypertension     5. Presence of cardiac pacemaker     6. Type 2 diabetes mellitus without complication, with long-term current use of insulin (CMS/[facility])     7. Renal insufficiency            Plan:       Continue anticoagulation needs sleep apnea control but patient unable to use it  History of CAD stable  Dyslipidemia reasonably controlled hypertension stable  Renal insufficiency stable  Pacemaker appears to be functioning well needs regular interrogation  Diabetes uncontrolled patient was advised to follow-up with PCP regarding the

## 2019-12-12 PROBLEM — N28.9 RENAL INSUFFICIENCY: Status: ACTIVE | Noted: 2017-05-30

## 2020-01-01 ENCOUNTER — READMISSION MANAGEMENT (OUTPATIENT)
Dept: CALL CENTER | Facility: HOSPITAL | Age: 75
End: 2020-01-01

## 2020-01-01 ENCOUNTER — APPOINTMENT (OUTPATIENT)
Dept: GENERAL RADIOLOGY | Facility: HOSPITAL | Age: 75
End: 2020-01-01

## 2020-01-01 ENCOUNTER — APPOINTMENT (OUTPATIENT)
Dept: CT IMAGING | Facility: HOSPITAL | Age: 75
End: 2020-01-01

## 2020-01-01 ENCOUNTER — HOSPITAL ENCOUNTER (EMERGENCY)
Facility: HOSPITAL | Age: 75
Discharge: HOME | End: 2020-04-28
Attending: EMERGENCY MEDICINE | Admitting: EMERGENCY MEDICINE

## 2020-01-01 ENCOUNTER — OFFICE VISIT (OUTPATIENT)
Dept: CARDIOLOGY | Facility: CLINIC | Age: 75
End: 2020-01-01

## 2020-01-01 ENCOUNTER — PATIENT OUTREACH (OUTPATIENT)
Dept: CASE MANAGEMENT | Facility: OTHER | Age: 75
End: 2020-01-01

## 2020-01-01 ENCOUNTER — EPISODE CHANGES (OUTPATIENT)
Dept: CASE MANAGEMENT | Facility: OTHER | Age: 75
End: 2020-01-01

## 2020-01-01 ENCOUNTER — APPOINTMENT (OUTPATIENT)
Dept: CARDIOLOGY | Facility: HOSPITAL | Age: 75
End: 2020-01-01

## 2020-01-01 ENCOUNTER — CLINICAL SUPPORT NO REQUIREMENTS (OUTPATIENT)
Dept: CARDIOLOGY | Facility: CLINIC | Age: 75
End: 2020-01-01

## 2020-01-01 ENCOUNTER — HOSPITAL ENCOUNTER (OUTPATIENT)
Facility: HOSPITAL | Age: 75
Setting detail: OBSERVATION
Discharge: HOME | End: 2020-08-31
Attending: INTERNAL MEDICINE | Admitting: HOSPITALIST

## 2020-01-01 ENCOUNTER — TELEPHONE (OUTPATIENT)
Dept: CARDIOLOGY | Facility: CLINIC | Age: 75
End: 2020-01-01

## 2020-01-01 ENCOUNTER — TELEPHONE (OUTPATIENT)
Dept: RHEUMATOLOGY | Facility: CLINIC | Age: 75
End: 2020-01-01

## 2020-01-01 ENCOUNTER — HOSPITAL ENCOUNTER (OUTPATIENT)
Facility: HOSPITAL | Age: 75
Setting detail: OBSERVATION
Discharge: HOME | End: 2020-02-04
Attending: HOSPITALIST | Admitting: INTERNAL MEDICINE

## 2020-01-01 ENCOUNTER — HOSPITAL ENCOUNTER (EMERGENCY)
Facility: HOSPITAL | Age: 75
Discharge: HOME | End: 2020-03-27
Attending: EMERGENCY MEDICINE | Admitting: EMERGENCY MEDICINE

## 2020-01-01 VITALS
OXYGEN SATURATION: 99 % | HEART RATE: 82 BPM | SYSTOLIC BLOOD PRESSURE: 121 MMHG | RESPIRATION RATE: 18 BRPM | DIASTOLIC BLOOD PRESSURE: 76 MMHG | HEIGHT: 68 IN | WEIGHT: 280.87 LBS | TEMPERATURE: 98.5 F | BODY MASS INDEX: 42.57 KG/M2

## 2020-01-01 VITALS
BODY MASS INDEX: 45.31 KG/M2 | OXYGEN SATURATION: 93 % | HEART RATE: 79 BPM | DIASTOLIC BLOOD PRESSURE: 86 MMHG | WEIGHT: 299 LBS | SYSTOLIC BLOOD PRESSURE: 150 MMHG | HEIGHT: 68 IN

## 2020-01-01 VITALS
TEMPERATURE: 97.8 F | WEIGHT: 284.39 LBS | HEART RATE: 70 BPM | DIASTOLIC BLOOD PRESSURE: 79 MMHG | OXYGEN SATURATION: 97 % | BODY MASS INDEX: 43.1 KG/M2 | RESPIRATION RATE: 20 BRPM | SYSTOLIC BLOOD PRESSURE: 156 MMHG | HEIGHT: 68 IN

## 2020-01-01 VITALS
SYSTOLIC BLOOD PRESSURE: 190 MMHG | TEMPERATURE: 96.7 F | OXYGEN SATURATION: 96 % | HEIGHT: 68 IN | DIASTOLIC BLOOD PRESSURE: 103 MMHG | RESPIRATION RATE: 15 BRPM | WEIGHT: 288.36 LBS | BODY MASS INDEX: 43.7 KG/M2 | HEART RATE: 71 BPM

## 2020-01-01 VITALS
TEMPERATURE: 97.9 F | RESPIRATION RATE: 16 BRPM | HEIGHT: 68 IN | BODY MASS INDEX: 41.19 KG/M2 | DIASTOLIC BLOOD PRESSURE: 68 MMHG | WEIGHT: 271.8 LBS | HEART RATE: 108 BPM | OXYGEN SATURATION: 97 % | SYSTOLIC BLOOD PRESSURE: 109 MMHG

## 2020-01-01 DIAGNOSIS — E87.6 HYPOKALEMIA: ICD-10-CM

## 2020-01-01 DIAGNOSIS — U07.1 PNEUMONIA DUE TO COVID-19 VIRUS: ICD-10-CM

## 2020-01-01 DIAGNOSIS — T07.XXXA MULTIPLE CONTUSIONS: ICD-10-CM

## 2020-01-01 DIAGNOSIS — Z95.0 PRESENCE OF CARDIAC PACEMAKER: ICD-10-CM

## 2020-01-01 DIAGNOSIS — S01.311A LACERATION OF RIGHT EAR LOBE, INITIAL ENCOUNTER: ICD-10-CM

## 2020-01-01 DIAGNOSIS — I49.5 SICK SINUS SYNDROME (HCC): ICD-10-CM

## 2020-01-01 DIAGNOSIS — E87.8 HYPOCHLOREMIA: ICD-10-CM

## 2020-01-01 DIAGNOSIS — S09.90XA INJURY OF HEAD, INITIAL ENCOUNTER: ICD-10-CM
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
## 2020-01-01 DIAGNOSIS — U07.1 COVID-19: Primary | ICD-10-CM

## 2020-01-01 DIAGNOSIS — I48.0 PAROXYSMAL ATRIAL FIBRILLATION (HCC): Chronic | ICD-10-CM

## 2020-01-01 DIAGNOSIS — I25.10 CORONARY ARTERY DISEASE INVOLVING NATIVE CORONARY ARTERY OF NATIVE HEART WITHOUT ANGINA PECTORIS: ICD-10-CM

## 2020-01-01 DIAGNOSIS — R55 SYNCOPE AND COLLAPSE: Primary | ICD-10-CM

## 2020-01-01 DIAGNOSIS — I48.0 PAROXYSMAL ATRIAL FIBRILLATION (HCC): Primary | ICD-10-CM

## 2020-01-01 DIAGNOSIS — I10 ESSENTIAL HYPERTENSION: ICD-10-CM

## 2020-01-01 DIAGNOSIS — R05.9 COUGH: Primary | ICD-10-CM

## 2020-01-01 DIAGNOSIS — E78.2 MIXED HYPERLIPIDEMIA: ICD-10-CM

## 2020-01-01 DIAGNOSIS — R53.1 GENERALIZED WEAKNESS: ICD-10-CM

## 2020-01-01 DIAGNOSIS — J12.82 PNEUMONIA DUE TO COVID-19 VIRUS: ICD-10-CM

## 2020-01-01 DIAGNOSIS — R06.00 DYSPNEA, UNSPECIFIED TYPE: Primary | ICD-10-CM

## 2020-01-01 DIAGNOSIS — J40 BRONCHITIS: ICD-10-CM

## 2020-01-01 DIAGNOSIS — Z95.0 PACEMAKER: Primary | ICD-10-CM

## 2020-01-01 DIAGNOSIS — M19.90 OSTEOARTHRITIS, UNSPECIFIED OSTEOARTHRITIS TYPE, UNSPECIFIED SITE: ICD-10-CM

## 2020-01-01 DIAGNOSIS — Z95.0 PRESENCE OF CARDIAC PACEMAKER: Chronic | ICD-10-CM

## 2020-01-01 DIAGNOSIS — I49.5 SICK SINUS SYNDROME (HCC): Primary | ICD-10-CM

## 2020-01-01 DIAGNOSIS — I50.9 CHRONIC CONGESTIVE HEART FAILURE, UNSPECIFIED HEART FAILURE TYPE (HCC): ICD-10-CM

## 2020-01-01 LAB
ALBUMIN SERPL-MCNC: 3.3 G/DL (ref 3.5–5.2)
ALBUMIN SERPL-MCNC: 3.5 G/DL (ref 3.5–5.2)
ALBUMIN SERPL-MCNC: 3.6 G/DL (ref 3.5–5.2)
ALBUMIN SERPL-MCNC: 3.7 G/DL (ref 3.5–5.2)
ALBUMIN SERPL-MCNC: 3.8 G/DL (ref 3.5–5.2)
ALBUMIN/GLOB SERPL: 1.2 G/DL
ALBUMIN/GLOB SERPL: 1.4 G/DL
ALP SERPL-CCNC: 40 U/L (ref 39–117)
ALP SERPL-CCNC: 43 U/L (ref 39–117)
ALP SERPL-CCNC: 47 U/L (ref 39–117)
ALP SERPL-CCNC: 53 U/L (ref 39–117)
ALP SERPL-CCNC: 57 U/L (ref 39–117)
ALT SERPL W P-5'-P-CCNC: 11 U/L (ref 1–41)
ALT SERPL W P-5'-P-CCNC: 12 U/L (ref 1–41)
ALT SERPL W P-5'-P-CCNC: 9 U/L (ref 1–41)
ANION GAP SERPL CALCULATED.3IONS-SCNC: 10 MMOL/L (ref 5–15)
ANION GAP SERPL CALCULATED.3IONS-SCNC: 10 MMOL/L (ref 5–15)
ANION GAP SERPL CALCULATED.3IONS-SCNC: 12 MMOL/L (ref 5–15)
ANION GAP SERPL CALCULATED.3IONS-SCNC: 13 MMOL/L (ref 5–15)
ANION GAP SERPL CALCULATED.3IONS-SCNC: 14 MMOL/L (ref 5–15)
ANION GAP SERPL CALCULATED.3IONS-SCNC: 15 MMOL/L (ref 5–15)
APTT PPP: 28.6 SECONDS (ref 24–31)
AST SERPL-CCNC: 16 U/L (ref 1–40)
AST SERPL-CCNC: 16 U/L (ref 1–40)
AST SERPL-CCNC: 17 U/L (ref 1–40)
AST SERPL-CCNC: 23 U/L (ref 1–40)
AST SERPL-CCNC: 24 U/L (ref 1–40)
BACTERIA SPEC AEROBE CULT: NORMAL
BACTERIA SPEC AEROBE CULT: NORMAL
BACTERIA UR QL AUTO: ABNORMAL /HPF
BASOPHILS # BLD AUTO: 0 10*3/MM3 (ref 0–0.2)
BASOPHILS # BLD AUTO: 0.1 10*3/MM3 (ref 0–0.2)
BASOPHILS NFR BLD AUTO: 0.2 % (ref 0–1.5)
BASOPHILS NFR BLD AUTO: 0.6 % (ref 0–1.5)
BASOPHILS NFR BLD AUTO: 0.7 % (ref 0–1.5)
BASOPHILS NFR BLD AUTO: 0.8 % (ref 0–1.5)
BH CV ECHO MEAS - ACS: 2.3 CM
BH CV ECHO MEAS - AO MAX PG (FULL): 3 MMHG
BH CV ECHO MEAS - AO MAX PG: 5.5 MMHG
BH CV ECHO MEAS - AO MEAN PG (FULL): 1.6 MMHG
BH CV ECHO MEAS - AO MEAN PG: 3.1 MMHG
BH CV ECHO MEAS - AO ROOT AREA (BSA CORRECTED): 1.7
BH CV ECHO MEAS - AO ROOT AREA: 11.9 CM^2
BH CV ECHO MEAS - AO ROOT DIAM: 3.9 CM
BH CV ECHO MEAS - AO V2 MAX: 117.6 CM/SEC
BH CV ECHO MEAS - AO V2 MEAN: 83.7 CM/SEC
BH CV ECHO MEAS - AO V2 VTI: 19.1 CM
BH CV ECHO MEAS - ASC AORTA: 3.6 CM
BH CV ECHO MEAS - AVA(I,A): 2.3 CM^2
BH CV ECHO MEAS - AVA(I,D): 2.3 CM^2
BH CV ECHO MEAS - AVA(V,A): 2.4 CM^2
BH CV ECHO MEAS - AVA(V,D): 2.4 CM^2
BH CV ECHO MEAS - BSA(HAYCOCK): 2.5 M^2
BH CV ECHO MEAS - BSA: 2.3 M^2
BH CV ECHO MEAS - BZI_BMI: 42.1 KILOGRAMS/M^2
BH CV ECHO MEAS - BZI_METRIC_HEIGHT: 172.7 CM
BH CV ECHO MEAS - BZI_METRIC_WEIGHT: 125.6 KG
BH CV ECHO MEAS - EDV(CUBED): 122.9 ML
BH CV ECHO MEAS - EDV(MOD-SP2): 21.4 ML
BH CV ECHO MEAS - EDV(MOD-SP4): 62.1 ML
BH CV ECHO MEAS - EDV(TEICH): 116.7 ML
BH CV ECHO MEAS - EF(CUBED): 57.5 %
BH CV ECHO MEAS - EF(MOD-BP): 53 %
BH CV ECHO MEAS - EF(MOD-SP2): 37.2 %
BH CV ECHO MEAS - EF(MOD-SP4): 62.3 %
BH CV ECHO MEAS - EF(TEICH): 48.9 %
BH CV ECHO MEAS - ESV(CUBED): 52.3 ML
BH CV ECHO MEAS - ESV(MOD-SP2): 13.4 ML
BH CV ECHO MEAS - ESV(MOD-SP4): 23.4 ML
BH CV ECHO MEAS - ESV(TEICH): 59.6 ML
BH CV ECHO MEAS - FS: 24.8 %
BH CV ECHO MEAS - IVS/LVPW: 0.91
BH CV ECHO MEAS - IVSD: 1.5 CM
BH CV ECHO MEAS - LA DIMENSION(2D): 4.2 CM
BH CV ECHO MEAS - LV DIASTOLIC VOL/BSA (35-75): 26.5 ML/M^2
BH CV ECHO MEAS - LV MASS(C)D: 335.7 GRAMS
BH CV ECHO MEAS - LV MASS(C)DI: 143 GRAMS/M^2
BH CV ECHO MEAS - LV MAX PG: 2.5 MMHG
BH CV ECHO MEAS - LV MEAN PG: 1.4 MMHG
BH CV ECHO MEAS - LV SYSTOLIC VOL/BSA (12-30): 10 ML/M^2
BH CV ECHO MEAS - LV V1 MAX: 79.2 CM/SEC
BH CV ECHO MEAS - LV V1 MEAN: 55.4 CM/SEC
BH CV ECHO MEAS - LV V1 VTI: 12.1 CM
BH CV ECHO MEAS - LVIDD: 5 CM
BH CV ECHO MEAS - LVIDS: 3.7 CM
BH CV ECHO MEAS - LVOT AREA: 3.6 CM^2
BH CV ECHO MEAS - LVOT DIAM: 2.1 CM
BH CV ECHO MEAS - LVPWD: 1.6 CM
BH CV ECHO MEAS - MV A MAX VEL: 42 CM/SEC
BH CV ECHO MEAS - MV DEC SLOPE: 281.2 CM/SEC^2
BH CV ECHO MEAS - MV DEC TIME: 0.17 SEC
BH CV ECHO MEAS - MV E MAX VEL: 25.1 CM/SEC
BH CV ECHO MEAS - MV E/A: 0.6
BH CV ECHO MEAS - MV MAX PG: 2.9 MMHG
BH CV ECHO MEAS - MV MEAN PG: 0.98 MMHG
BH CV ECHO MEAS - MV V2 MAX: 84.8 CM/SEC
BH CV ECHO MEAS - MV V2 MEAN: 44.7 CM/SEC
BH CV ECHO MEAS - MV V2 VTI: 19.8 CM
BH CV ECHO MEAS - MVA(VTI): 2.2 CM^2
BH CV ECHO MEAS - PA ACC TIME: 0.12 SEC
BH CV ECHO MEAS - PA MAX PG (FULL): 0.88 MMHG
BH CV ECHO MEAS - PA MAX PG: 3.2 MMHG
BH CV ECHO MEAS - PA PR(ACCEL): 27.1 MMHG
BH CV ECHO MEAS - PA V2 MAX: 89.7 CM/SEC
BH CV ECHO MEAS - RAP SYSTOLE: 8 MMHG
BH CV ECHO MEAS - RV MAX PG: 2.3 MMHG
BH CV ECHO MEAS - RV MEAN PG: 1.1 MMHG
BH CV ECHO MEAS - RV V1 MAX: 76.5 CM/SEC
BH CV ECHO MEAS - RV V1 MEAN: 47.6 CM/SEC
BH CV ECHO MEAS - RV V1 VTI: 14.5 CM
BH CV ECHO MEAS - RVDD: 3.5 CM
BH CV ECHO MEAS - RVSP: 26.7 MMHG
BH CV ECHO MEAS - SI(AO): 96.8 ML/M^2
BH CV ECHO MEAS - SI(CUBED): 30.1 ML/M^2
BH CV ECHO MEAS - SI(LVOT): 18.5 ML/M^2
BH CV ECHO MEAS - SI(MOD-SP2): 3.4 ML/M^2
BH CV ECHO MEAS - SI(MOD-SP4): 16.5 ML/M^2
BH CV ECHO MEAS - SI(TEICH): 24.3 ML/M^2
BH CV ECHO MEAS - SV(AO): 227.3 ML
BH CV ECHO MEAS - SV(CUBED): 70.6 ML
BH CV ECHO MEAS - SV(LVOT): 43.4 ML
BH CV ECHO MEAS - SV(MOD-SP2): 8 ML
BH CV ECHO MEAS - SV(MOD-SP4): 38.7 ML
BH CV ECHO MEAS - SV(TEICH): 57.1 ML
BH CV ECHO MEAS - TR MAX VEL: 216.1 CM/SEC
BH CV LOWER VASCULAR LEFT COMMON FEMORAL AUGMENT: NORMAL
BH CV LOWER VASCULAR LEFT COMMON FEMORAL COMPETENT: NORMAL
BH CV LOWER VASCULAR LEFT COMMON FEMORAL COMPRESS: NORMAL
BH CV LOWER VASCULAR LEFT COMMON FEMORAL PHASIC: NORMAL
BH CV LOWER VASCULAR LEFT COMMON FEMORAL SPONT: NORMAL
BH CV LOWER VASCULAR LEFT DISTAL FEMORAL COMPRESS: NORMAL
BH CV LOWER VASCULAR LEFT GASTRONEMIUS COMPRESS: NORMAL
BH CV LOWER VASCULAR LEFT GREATER SAPH AK COMPRESS: NORMAL
BH CV LOWER VASCULAR LEFT GREATER SAPH BK COMPRESS: NORMAL
BH CV LOWER VASCULAR LEFT LESSER SAPH COMPRESS: NORMAL
BH CV LOWER VASCULAR LEFT MID FEMORAL AUGMENT: NORMAL
BH CV LOWER VASCULAR LEFT MID FEMORAL COMPETENT: NORMAL
BH CV LOWER VASCULAR LEFT MID FEMORAL COMPRESS: NORMAL
BH CV LOWER VASCULAR LEFT MID FEMORAL PHASIC: NORMAL
BH CV LOWER VASCULAR LEFT MID FEMORAL SPONT: NORMAL
BH CV LOWER VASCULAR LEFT PERONEAL COMPRESS: NORMAL
BH CV LOWER VASCULAR LEFT POPLITEAL AUGMENT: NORMAL
BH CV LOWER VASCULAR LEFT POPLITEAL COMPETENT: NORMAL
BH CV LOWER VASCULAR LEFT POPLITEAL COMPRESS: NORMAL
BH CV LOWER VASCULAR LEFT POPLITEAL PHASIC: NORMAL
BH CV LOWER VASCULAR LEFT POPLITEAL SPONT: NORMAL
BH CV LOWER VASCULAR LEFT POSTERIOR TIBIAL COMPRESS: NORMAL
BH CV LOWER VASCULAR LEFT PROXIMAL FEMORAL COMPRESS: NORMAL
BH CV LOWER VASCULAR LEFT SAPHENOFEMORAL JUNCTION AUGMENT: NORMAL
BH CV LOWER VASCULAR LEFT SAPHENOFEMORAL JUNCTION COMPETENT: NORMAL
BH CV LOWER VASCULAR LEFT SAPHENOFEMORAL JUNCTION COMPRESS: NORMAL
BH CV LOWER VASCULAR LEFT SAPHENOFEMORAL JUNCTION PHASIC: NORMAL
BH CV LOWER VASCULAR LEFT SAPHENOFEMORAL JUNCTION SPONT: NORMAL
BH CV LOWER VASCULAR RIGHT COMMON FEMORAL AUGMENT: NORMAL
BH CV LOWER VASCULAR RIGHT COMMON FEMORAL COMPETENT: NORMAL
BH CV LOWER VASCULAR RIGHT COMMON FEMORAL COMPRESS: NORMAL
BH CV LOWER VASCULAR RIGHT COMMON FEMORAL PHASIC: NORMAL
BH CV LOWER VASCULAR RIGHT COMMON FEMORAL SPONT: NORMAL
BH CV LOWER VASCULAR RIGHT DISTAL FEMORAL COMPRESS: NORMAL
BH CV LOWER VASCULAR RIGHT GASTRONEMIUS COMPRESS: NORMAL
BH CV LOWER VASCULAR RIGHT GREATER SAPH AK COMPRESS: NORMAL
BH CV LOWER VASCULAR RIGHT GREATER SAPH BK COMPRESS: NORMAL
BH CV LOWER VASCULAR RIGHT LESSER SAPH COMPRESS: NORMAL
BH CV LOWER VASCULAR RIGHT MID FEMORAL AUGMENT: NORMAL
BH CV LOWER VASCULAR RIGHT MID FEMORAL COMPETENT: NORMAL
BH CV LOWER VASCULAR RIGHT MID FEMORAL COMPRESS: NORMAL
BH CV LOWER VASCULAR RIGHT MID FEMORAL PHASIC: NORMAL
BH CV LOWER VASCULAR RIGHT MID FEMORAL SPONT: NORMAL
BH CV LOWER VASCULAR RIGHT PERONEAL COMPRESS: NORMAL
BH CV LOWER VASCULAR RIGHT POPLITEAL AUGMENT: NORMAL
BH CV LOWER VASCULAR RIGHT POPLITEAL COMPETENT: NORMAL
BH CV LOWER VASCULAR RIGHT POPLITEAL COMPRESS: NORMAL
BH CV LOWER VASCULAR RIGHT POPLITEAL PHASIC: NORMAL
BH CV LOWER VASCULAR RIGHT POPLITEAL SPONT: NORMAL
BH CV LOWER VASCULAR RIGHT POSTERIOR TIBIAL COMPRESS: NORMAL
BH CV LOWER VASCULAR RIGHT PROXIMAL FEMORAL COMPRESS: NORMAL
BH CV LOWER VASCULAR RIGHT SAPHENOFEMORAL JUNCTION AUGMENT: NORMAL
BH CV LOWER VASCULAR RIGHT SAPHENOFEMORAL JUNCTION COMPETENT: NORMAL
BH CV LOWER VASCULAR RIGHT SAPHENOFEMORAL JUNCTION COMPRESS: NORMAL
BH CV LOWER VASCULAR RIGHT SAPHENOFEMORAL JUNCTION PHASIC: NORMAL
BH CV LOWER VASCULAR RIGHT SAPHENOFEMORAL JUNCTION SPONT: NORMAL
BH CV XLRA MEAS LEFT DIST CCA EDV: -13.6 CM/SEC
BH CV XLRA MEAS LEFT DIST CCA PSV: -66.7 CM/SEC
BH CV XLRA MEAS LEFT DIST ICA EDV: -16.8 CM/SEC
BH CV XLRA MEAS LEFT DIST ICA PSV: -38.7 CM/SEC
BH CV XLRA MEAS LEFT ICA/CCA RATIO: 0.6
BH CV XLRA MEAS LEFT PROX CCA EDV: 19.8 CM/SEC
BH CV XLRA MEAS LEFT PROX CCA PSV: 81.7 CM/SEC
BH CV XLRA MEAS LEFT PROX ECA PSV: -54.5 CM/SEC
BH CV XLRA MEAS LEFT PROX ICA EDV: -20.6 CM/SEC
BH CV XLRA MEAS LEFT PROX ICA PSV: -47.9 CM/SEC
BH CV XLRA MEAS LEFT PROX SCLA PSV: 122 CM/SEC
BH CV XLRA MEAS LEFT VERTEBRAL A PSV: 28.1 CM/SEC
BH CV XLRA MEAS RIGHT DIST CCA EDV: -21.1 CM/SEC
BH CV XLRA MEAS RIGHT DIST CCA PSV: -70.8 CM/SEC
BH CV XLRA MEAS RIGHT DIST ICA EDV: -10.1 CM/SEC
BH CV XLRA MEAS RIGHT DIST ICA PSV: -26.4 CM/SEC
BH CV XLRA MEAS RIGHT ICA/CCA RATIO: 0.4
BH CV XLRA MEAS RIGHT PROX CCA EDV: -14.3 CM/SEC
BH CV XLRA MEAS RIGHT PROX CCA PSV: -80.8 CM/SEC
BH CV XLRA MEAS RIGHT PROX ECA PSV: -52.9 CM/SEC
BH CV XLRA MEAS RIGHT PROX ICA EDV: -11 CM/SEC
BH CV XLRA MEAS RIGHT PROX ICA PSV: -28.6 CM/SEC
BH CV XLRA MEAS RIGHT PROX SCLA PSV: -49.7 CM/SEC
BH CV XLRA MEAS RIGHT VERTEBRAL A PSV: -27.6 CM/SEC
BILIRUB SERPL-MCNC: 0.3 MG/DL (ref 0–1.2)
BILIRUB SERPL-MCNC: 0.4 MG/DL (ref 0.2–1.2)
BILIRUB SERPL-MCNC: 0.4 MG/DL (ref 0.2–1.2)
BILIRUB SERPL-MCNC: 0.4 MG/DL (ref 0–1.2)
BILIRUB SERPL-MCNC: 0.5 MG/DL (ref 0.2–1.2)
BILIRUB UR QL STRIP: NEGATIVE
BUN BLD-MCNC: 23 MG/DL (ref 8–23)
BUN BLD-MCNC: 45 MG/DL (ref 8–23)
BUN BLD-MCNC: 47 MG/DL (ref 8–23)
BUN BLD-MCNC: 48 MG/DL (ref 8–23)
BUN BLD-MCNC: 48 MG/DL (ref 8–23)
BUN BLD-MCNC: 50 MG/DL (ref 8–23)
BUN SERPL-MCNC: 14 MG/DL (ref 8–23)
BUN SERPL-MCNC: 18 MG/DL (ref 8–23)
BUN SERPL-MCNC: ABNORMAL MG/DL
BUN SERPL-MCNC: ABNORMAL MG/DL
BUN/CREAT SERPL: 12.8 (ref 7–25)
BUN/CREAT SERPL: 18.2 (ref 7–25)
BUN/CREAT SERPL: 19.4 (ref 7–25)
BUN/CREAT SERPL: 21.2 (ref 7–25)
BUN/CREAT SERPL: 22.2 (ref 7–25)
BUN/CREAT SERPL: 23.3 (ref 7–25)
BUN/CREAT SERPL: ABNORMAL
BUN/CREAT SERPL: ABNORMAL
CALCIUM SPEC-SCNC: 10 MG/DL (ref 8.6–10.5)
CALCIUM SPEC-SCNC: 8.3 MG/DL (ref 8.6–10.5)
CALCIUM SPEC-SCNC: 8.5 MG/DL (ref 8.6–10.5)
CALCIUM SPEC-SCNC: 8.8 MG/DL (ref 8.6–10.5)
CALCIUM SPEC-SCNC: 9.2 MG/DL (ref 8.6–10.5)
CALCIUM SPEC-SCNC: 9.4 MG/DL (ref 8.6–10.5)
CALCIUM SPEC-SCNC: 9.5 MG/DL (ref 8.6–10.5)
CALCIUM SPEC-SCNC: 9.6 MG/DL (ref 8.6–10.5)
CHLORIDE SERPL-SCNC: 87 MMOL/L (ref 98–107)
CHLORIDE SERPL-SCNC: 88 MMOL/L (ref 98–107)
CHLORIDE SERPL-SCNC: 89 MMOL/L (ref 98–107)
CHLORIDE SERPL-SCNC: 89 MMOL/L (ref 98–107)
CHLORIDE SERPL-SCNC: 93 MMOL/L (ref 98–107)
CHLORIDE SERPL-SCNC: 95 MMOL/L (ref 98–107)
CHLORIDE SERPL-SCNC: 95 MMOL/L (ref 98–107)
CHLORIDE SERPL-SCNC: 96 MMOL/L (ref 98–107)
CHOLEST SERPL-MCNC: 150 MG/DL (ref 0–200)
CK SERPL-CCNC: 123 U/L (ref 20–200)
CK SERPL-CCNC: 255 U/L (ref 20–200)
CLARITY UR: CLEAR
CO2 SERPL-SCNC: 28 MMOL/L (ref 22–29)
CO2 SERPL-SCNC: 28 MMOL/L (ref 22–29)
CO2 SERPL-SCNC: 31 MMOL/L (ref 22–29)
CO2 SERPL-SCNC: 31 MMOL/L (ref 22–29)
CO2 SERPL-SCNC: 32 MMOL/L (ref 22–29)
CO2 SERPL-SCNC: 33 MMOL/L (ref 22–29)
CO2 SERPL-SCNC: 34 MMOL/L (ref 22–29)
CO2 SERPL-SCNC: 35 MMOL/L (ref 22–29)
COLOR UR: YELLOW
CREAT BLD-MCNC: 1.8 MG/DL (ref 0.76–1.27)
CREAT BLD-MCNC: 1.93 MG/DL (ref 0.76–1.27)
CREAT BLD-MCNC: 2.16 MG/DL (ref 0.76–1.27)
CREAT BLD-MCNC: 2.36 MG/DL (ref 0.76–1.27)
CREAT BLD-MCNC: 2.42 MG/DL (ref 0.76–1.27)
CREAT BLD-MCNC: 2.64 MG/DL (ref 0.76–1.27)
CREAT SERPL-MCNC: 1.39 MG/DL (ref 0.76–1.27)
CREAT SERPL-MCNC: 1.5 MG/DL (ref 0.76–1.27)
CRP SERPL-MCNC: 1.98 MG/DL (ref 0–0.5)
CRP SERPL-MCNC: 2.64 MG/DL (ref 0–0.5)
CRP SERPL-MCNC: 6.23 MG/DL (ref 0–0.5)
D DIMER PPP FEU-MCNC: 0.31 MG/L (FEU) (ref 0–0.59)
DEPRECATED RDW RBC AUTO: 43.8 FL (ref 37–54)
DEPRECATED RDW RBC AUTO: 45.1 FL (ref 37–54)
DEPRECATED RDW RBC AUTO: 45.9 FL (ref 37–54)
DEPRECATED RDW RBC AUTO: 46.8 FL (ref 37–54)
DEPRECATED RDW RBC AUTO: 46.8 FL (ref 37–54)
EOSINOPHIL # BLD AUTO: 0 10*3/MM3 (ref 0–0.4)
EOSINOPHIL # BLD AUTO: 0.1 10*3/MM3 (ref 0–0.4)
EOSINOPHIL # BLD AUTO: 0.2 10*3/MM3 (ref 0–0.4)
EOSINOPHIL NFR BLD AUTO: 0 % (ref 0.3–6.2)
EOSINOPHIL NFR BLD AUTO: 1.8 % (ref 0.3–6.2)
EOSINOPHIL NFR BLD AUTO: 2 % (ref 0.3–6.2)
EOSINOPHIL NFR BLD AUTO: 2.2 % (ref 0.3–6.2)
EOSINOPHIL NFR BLD AUTO: 3.3 % (ref 0.3–6.2)
EOSINOPHIL NFR BLD AUTO: 4.8 % (ref 0.3–6.2)
ERYTHROCYTE [DISTWIDTH] IN BLOOD BY AUTOMATED COUNT: 14.1 % (ref 12.3–15.4)
ERYTHROCYTE [DISTWIDTH] IN BLOOD BY AUTOMATED COUNT: 14.4 % (ref 12.3–15.4)
ERYTHROCYTE [DISTWIDTH] IN BLOOD BY AUTOMATED COUNT: 14.5 % (ref 12.3–15.4)
ERYTHROCYTE [DISTWIDTH] IN BLOOD BY AUTOMATED COUNT: 14.6 % (ref 12.3–15.4)
ERYTHROCYTE [DISTWIDTH] IN BLOOD BY AUTOMATED COUNT: 14.7 % (ref 12.3–15.4)
ERYTHROCYTE [DISTWIDTH] IN BLOOD BY AUTOMATED COUNT: 14.9 % (ref 12.3–15.4)
ERYTHROCYTE [DISTWIDTH] IN BLOOD BY AUTOMATED COUNT: 14.9 % (ref 12.3–15.4)
ERYTHROCYTE [SEDIMENTATION RATE] IN BLOOD: 46 MM/HR (ref 0–20)
ERYTHROCYTE [SEDIMENTATION RATE] IN BLOOD: 46 MM/HR (ref 0–20)
FERRITIN SERPL-MCNC: 171.6 NG/ML (ref 30–400)
FERRITIN SERPL-MCNC: 234.2 NG/ML (ref 30–400)
GFR SERPL CREATININE-BSD FRML MDRD: 24 ML/MIN/1.73
GFR SERPL CREATININE-BSD FRML MDRD: 26 ML/MIN/1.73
GFR SERPL CREATININE-BSD FRML MDRD: 27 ML/MIN/1.73
GFR SERPL CREATININE-BSD FRML MDRD: 30 ML/MIN/1.73
GFR SERPL CREATININE-BSD FRML MDRD: 34 ML/MIN/1.73
GFR SERPL CREATININE-BSD FRML MDRD: 37 ML/MIN/1.73
GFR SERPL CREATININE-BSD FRML MDRD: 46 ML/MIN/1.73
GFR SERPL CREATININE-BSD FRML MDRD: 50 ML/MIN/1.73
GLOBULIN UR ELPH-MCNC: 2.3 GM/DL
GLOBULIN UR ELPH-MCNC: 2.5 GM/DL
GLOBULIN UR ELPH-MCNC: 2.5 GM/DL
GLOBULIN UR ELPH-MCNC: 2.8 GM/DL
GLOBULIN UR ELPH-MCNC: 3.2 GM/DL
GLUCOSE BLD-MCNC: 126 MG/DL (ref 65–99)
GLUCOSE BLD-MCNC: 144 MG/DL (ref 65–99)
GLUCOSE BLD-MCNC: 168 MG/DL (ref 65–99)
GLUCOSE BLD-MCNC: 177 MG/DL (ref 65–99)
GLUCOSE BLD-MCNC: 229 MG/DL (ref 65–99)
GLUCOSE BLD-MCNC: 258 MG/DL (ref 65–99)
GLUCOSE BLDC GLUCOMTR-MCNC: 144 MG/DL (ref 70–105)
GLUCOSE BLDC GLUCOMTR-MCNC: 158 MG/DL (ref 70–105)
GLUCOSE BLDC GLUCOMTR-MCNC: 177 MG/DL (ref 70–105)
GLUCOSE BLDC GLUCOMTR-MCNC: 178 MG/DL (ref 70–105)
GLUCOSE BLDC GLUCOMTR-MCNC: 213 MG/DL (ref 70–105)
GLUCOSE BLDC GLUCOMTR-MCNC: 223 MG/DL (ref 70–105)
GLUCOSE BLDC GLUCOMTR-MCNC: 231 MG/DL (ref 70–105)
GLUCOSE BLDC GLUCOMTR-MCNC: 231 MG/DL (ref 70–105)
GLUCOSE BLDC GLUCOMTR-MCNC: 232 MG/DL (ref 70–105)
GLUCOSE BLDC GLUCOMTR-MCNC: 236 MG/DL (ref 70–105)
GLUCOSE BLDC GLUCOMTR-MCNC: 254 MG/DL (ref 70–105)
GLUCOSE BLDC GLUCOMTR-MCNC: 264 MG/DL (ref 70–105)
GLUCOSE BLDC GLUCOMTR-MCNC: 267 MG/DL (ref 70–105)
GLUCOSE BLDC GLUCOMTR-MCNC: 271 MG/DL (ref 70–105)
GLUCOSE BLDC GLUCOMTR-MCNC: 273 MG/DL (ref 70–105)
GLUCOSE BLDC GLUCOMTR-MCNC: 282 MG/DL (ref 70–105)
GLUCOSE BLDC GLUCOMTR-MCNC: 291 MG/DL (ref 70–105)
GLUCOSE BLDC GLUCOMTR-MCNC: 296 MG/DL (ref 70–105)
GLUCOSE BLDC GLUCOMTR-MCNC: 306 MG/DL (ref 70–105)
GLUCOSE BLDC GLUCOMTR-MCNC: 348 MG/DL (ref 70–105)
GLUCOSE SERPL-MCNC: 184 MG/DL (ref 65–99)
GLUCOSE SERPL-MCNC: 315 MG/DL (ref 65–99)
GLUCOSE UR STRIP-MCNC: ABNORMAL MG/DL
HBA1C MFR BLD: 10.1 % (ref 3.5–5.6)
HBA1C MFR BLD: 11.7 % (ref 3.5–5.6)
HCT VFR BLD AUTO: 32.7 % (ref 37.5–51)
HCT VFR BLD AUTO: 33.1 % (ref 37.5–51)
HCT VFR BLD AUTO: 34.7 % (ref 37.5–51)
HCT VFR BLD AUTO: 35.3 % (ref 37.5–51)
HCT VFR BLD AUTO: 36.2 % (ref 37.5–51)
HCT VFR BLD AUTO: 36.8 % (ref 37.5–51)
HCT VFR BLD AUTO: 37.3 % (ref 37.5–51)
HDLC SERPL-MCNC: 51 MG/DL (ref 40–60)
HGB BLD-MCNC: 11.4 G/DL (ref 13–17.7)
HGB BLD-MCNC: 11.6 G/DL (ref 13–17.7)
HGB BLD-MCNC: 11.8 G/DL (ref 13–17.7)
HGB BLD-MCNC: 11.8 G/DL (ref 13–17.7)
HGB BLD-MCNC: 11.9 G/DL (ref 13–17.7)
HGB BLD-MCNC: 12.2 G/DL (ref 13–17.7)
HGB BLD-MCNC: 12.3 G/DL (ref 13–17.7)
HGB UR QL STRIP.AUTO: ABNORMAL
HGB UR QL STRIP.AUTO: NEGATIVE
HGB UR QL STRIP.AUTO: NEGATIVE
HOLD SPECIMEN: NORMAL
HOLD SPECIMEN: NORMAL
HYALINE CASTS UR QL AUTO: ABNORMAL /LPF
INR PPP: 1.14 (ref 0.9–1.1)
KETONES UR QL STRIP: NEGATIVE
LDH SERPL-CCNC: 372 U/L (ref 135–225)
LDLC SERPL CALC-MCNC: 78 MG/DL (ref 0–100)
LDLC/HDLC SERPL: 1.52 {RATIO}
LEUKOCYTE ESTERASE UR QL STRIP.AUTO: NEGATIVE
LV EF 2D ECHO EST: 50 %
LYMPHOCYTES # BLD AUTO: 0.5 10*3/MM3 (ref 0.7–3.1)
LYMPHOCYTES # BLD AUTO: 1.4 10*3/MM3 (ref 0.7–3.1)
LYMPHOCYTES # BLD AUTO: 1.6 10*3/MM3 (ref 0.7–3.1)
LYMPHOCYTES NFR BLD AUTO: 17.6 % (ref 19.6–45.3)
LYMPHOCYTES NFR BLD AUTO: 19.2 % (ref 19.6–45.3)
LYMPHOCYTES NFR BLD AUTO: 20.4 % (ref 19.6–45.3)
LYMPHOCYTES NFR BLD AUTO: 23.2 % (ref 19.6–45.3)
LYMPHOCYTES NFR BLD AUTO: 23.5 % (ref 19.6–45.3)
LYMPHOCYTES NFR BLD AUTO: 26.5 % (ref 19.6–45.3)
MAGNESIUM SERPL-MCNC: 1.9 MG/DL (ref 1.6–2.4)
MAGNESIUM SERPL-MCNC: 2 MG/DL (ref 1.6–2.4)
MAGNESIUM SERPL-MCNC: 2.1 MG/DL (ref 1.6–2.4)
MCH RBC QN AUTO: 28.6 PG (ref 26.6–33)
MCH RBC QN AUTO: 29 PG (ref 26.6–33)
MCH RBC QN AUTO: 29.3 PG (ref 26.6–33)
MCH RBC QN AUTO: 29.9 PG (ref 26.6–33)
MCH RBC QN AUTO: 30 PG (ref 26.6–33)
MCH RBC QN AUTO: 31.6 PG (ref 26.6–33)
MCH RBC QN AUTO: 32 PG (ref 26.6–33)
MCHC RBC AUTO-ENTMCNC: 32.6 G/DL (ref 31.5–35.7)
MCHC RBC AUTO-ENTMCNC: 32.7 G/DL (ref 31.5–35.7)
MCHC RBC AUTO-ENTMCNC: 33.4 G/DL (ref 31.5–35.7)
MCHC RBC AUTO-ENTMCNC: 33.5 G/DL (ref 31.5–35.7)
MCHC RBC AUTO-ENTMCNC: 33.9 G/DL (ref 31.5–35.7)
MCHC RBC AUTO-ENTMCNC: 34.3 G/DL (ref 31.5–35.7)
MCHC RBC AUTO-ENTMCNC: 36 G/DL (ref 31.5–35.7)
MCV RBC AUTO: 87.7 FL (ref 79–97)
MCV RBC AUTO: 87.7 FL (ref 79–97)
MCV RBC AUTO: 88.5 FL (ref 79–97)
MCV RBC AUTO: 88.7 FL (ref 79–97)
MCV RBC AUTO: 88.8 FL (ref 79–97)
MCV RBC AUTO: 89.3 FL (ref 79–97)
MCV RBC AUTO: 92.2 FL (ref 79–97)
MONOCYTES # BLD AUTO: 0.2 10*3/MM3 (ref 0.1–0.9)
MONOCYTES # BLD AUTO: 0.3 10*3/MM3 (ref 0.1–0.9)
MONOCYTES # BLD AUTO: 0.4 10*3/MM3 (ref 0.1–0.9)
MONOCYTES # BLD AUTO: 0.5 10*3/MM3 (ref 0.1–0.9)
MONOCYTES NFR BLD AUTO: 5.2 % (ref 5–12)
MONOCYTES NFR BLD AUTO: 5.3 % (ref 5–12)
MONOCYTES NFR BLD AUTO: 5.7 % (ref 5–12)
MONOCYTES NFR BLD AUTO: 6 % (ref 5–12)
MONOCYTES NFR BLD AUTO: 6.2 % (ref 5–12)
MONOCYTES NFR BLD AUTO: 9.2 % (ref 5–12)
NEUTROPHILS # BLD AUTO: 3.2 10*3/MM3 (ref 1.7–7)
NEUTROPHILS # BLD AUTO: 4 10*3/MM3 (ref 1.7–7)
NEUTROPHILS # BLD AUTO: 5.5 10*3/MM3 (ref 1.7–7)
NEUTROPHILS # BLD AUTO: 5.7 10*3/MM3 (ref 1.7–7)
NEUTROPHILS # BLD AUTO: 5.7 10*3/MM3 (ref 1.7–7)
NEUTROPHILS NFR BLD AUTO: 1.4 10*3/MM3 (ref 1.7–7)
NEUTROPHILS NFR BLD AUTO: 62.6 % (ref 42.7–76)
NEUTROPHILS NFR BLD AUTO: 66.5 % (ref 42.7–76)
NEUTROPHILS NFR BLD AUTO: 67.1 % (ref 42.7–76)
NEUTROPHILS NFR BLD AUTO: 71.4 % (ref 42.7–76)
NEUTROPHILS NFR BLD AUTO: 72.6 % (ref 42.7–76)
NEUTROPHILS NFR BLD AUTO: 73.8 % (ref 42.7–76)
NITRITE UR QL STRIP: NEGATIVE
NRBC BLD AUTO-RTO: 0 /100 WBC (ref 0–0.2)
NRBC BLD AUTO-RTO: 0.1 /100 WBC (ref 0–0.2)
NRBC BLD AUTO-RTO: 0.4 /100 WBC (ref 0–0.2)
NT-PROBNP SERPL-MCNC: 1678 PG/ML (ref 5–900)
NT-PROBNP SERPL-MCNC: 2077 PG/ML (ref 5–900)
NT-PROBNP SERPL-MCNC: 4066 PG/ML (ref 0–900)
NT-PROBNP SERPL-MCNC: 469.7 PG/ML (ref 5–900)
PH UR STRIP.AUTO: 5.5 [PH] (ref 5–8)
PH UR STRIP.AUTO: 6 [PH] (ref 5–8)
PH UR STRIP.AUTO: 7 [PH] (ref 5–8)
PHOSPHATE SERPL-MCNC: 4 MG/DL (ref 2.5–4.5)
PLATELET # BLD AUTO: 113 10*3/MM3 (ref 140–450)
PLATELET # BLD AUTO: 119 10*3/MM3 (ref 140–450)
PLATELET # BLD AUTO: 156 10*3/MM3 (ref 140–450)
PLATELET # BLD AUTO: 161 10*3/MM3 (ref 140–450)
PLATELET # BLD AUTO: 181 10*3/MM3 (ref 140–450)
PLATELET # BLD AUTO: 185 10*3/MM3 (ref 140–450)
PLATELET # BLD AUTO: 189 10*3/MM3 (ref 140–450)
PMV BLD AUTO: 7 FL (ref 6–12)
PMV BLD AUTO: 7.2 FL (ref 6–12)
PMV BLD AUTO: 7.3 FL (ref 6–12)
PMV BLD AUTO: 7.3 FL (ref 6–12)
PMV BLD AUTO: 7.4 FL (ref 6–12)
PMV BLD AUTO: 7.8 FL (ref 6–12)
PMV BLD AUTO: 8.1 FL (ref 6–12)
POTASSIUM BLD-SCNC: 3 MMOL/L (ref 3.5–5.2)
POTASSIUM BLD-SCNC: 3 MMOL/L (ref 3.5–5.2)
POTASSIUM BLD-SCNC: 3.2 MMOL/L (ref 3.5–5.2)
POTASSIUM BLD-SCNC: 3.2 MMOL/L (ref 3.5–5.2)
POTASSIUM BLD-SCNC: 3.3 MMOL/L (ref 3.5–5.2)
POTASSIUM BLD-SCNC: 4 MMOL/L (ref 3.5–5.2)
POTASSIUM SERPL-SCNC: 2.9 MMOL/L (ref 3.5–5.2)
POTASSIUM SERPL-SCNC: 3.7 MMOL/L (ref 3.5–5.2)
PROCALCITONIN SERPL-MCNC: 0.05 NG/ML (ref 0–0.25)
PROT SERPL-MCNC: 5.6 G/DL (ref 6–8.5)
PROT SERPL-MCNC: 6 G/DL (ref 6–8.5)
PROT SERPL-MCNC: 6.1 G/DL (ref 6–8.5)
PROT SERPL-MCNC: 6.6 G/DL (ref 6–8.5)
PROT SERPL-MCNC: 6.9 G/DL (ref 6–8.5)
PROT UR QL STRIP: ABNORMAL
PROT UR QL STRIP: NEGATIVE
PROT UR QL STRIP: NEGATIVE
PROTHROMBIN TIME: 11.7 SECONDS (ref 9.6–11.7)
RBC # BLD AUTO: 3.59 10*6/MM3 (ref 4.14–5.8)
RBC # BLD AUTO: 3.68 10*6/MM3 (ref 4.14–5.8)
RBC # BLD AUTO: 3.88 10*6/MM3 (ref 4.14–5.8)
RBC # BLD AUTO: 3.98 10*6/MM3 (ref 4.14–5.8)
RBC # BLD AUTO: 4.08 10*6/MM3 (ref 4.14–5.8)
RBC # BLD AUTO: 4.19 10*6/MM3 (ref 4.14–5.8)
RBC # BLD AUTO: 4.25 10*6/MM3 (ref 4.14–5.8)
RBC # UR: ABNORMAL /HPF
REF LAB TEST METHOD: ABNORMAL
SARS-COV-2 RNA PNL SPEC NAA+PROBE: DETECTED
SARS-COV-2 RNA RESP QL NAA+PROBE: NOT DETECTED
SARS-COV-2 RNA RESP QL NAA+PROBE: NOT DETECTED
SODIUM BLD-SCNC: 132 MMOL/L (ref 136–145)
SODIUM BLD-SCNC: 134 MMOL/L (ref 136–145)
SODIUM BLD-SCNC: 134 MMOL/L (ref 136–145)
SODIUM BLD-SCNC: 137 MMOL/L (ref 136–145)
SODIUM BLD-SCNC: 139 MMOL/L (ref 136–145)
SODIUM BLD-SCNC: 139 MMOL/L (ref 136–145)
SODIUM SERPL-SCNC: 134 MMOL/L (ref 136–145)
SODIUM SERPL-SCNC: 135 MMOL/L (ref 136–145)
SP GR UR STRIP: 1.01 (ref 1–1.03)
SP GR UR STRIP: 1.02 (ref 1–1.03)
SP GR UR STRIP: 1.02 (ref 1–1.03)
SQUAMOUS #/AREA URNS HPF: ABNORMAL /HPF
TRIGL SERPL-MCNC: 107 MG/DL (ref 0–150)
TROPONIN T SERPL-MCNC: 0.02 NG/ML (ref 0–0.03)
TROPONIN T SERPL-MCNC: 0.03 NG/ML (ref 0–0.03)
TROPONIN T SERPL-MCNC: 0.04 NG/ML (ref 0–0.03)
TROPONIN T SERPL-MCNC: 0.04 NG/ML (ref 0–0.03)
TROPONIN T SERPL-MCNC: 0.05 NG/ML (ref 0–0.03)
TSH SERPL DL<=0.05 MIU/L-ACNC: 1.84 UIU/ML (ref 0.27–4.2)
TSH SERPL DL<=0.05 MIU/L-ACNC: 3.83 UIU/ML (ref 0.27–4.2)
URATE SERPL-MCNC: 9.3 MG/DL (ref 3.4–7)
UROBILINOGEN UR QL STRIP: ABNORMAL
VIT B12 BLD-MCNC: 483 PG/ML (ref 211–946)
VLDLC SERPL-MCNC: 21.4 MG/DL
WBC # BLD AUTO: 1.6 10*3/MM3 (ref 3.4–10.8)
WBC # BLD AUTO: 2.1 10*3/MM3 (ref 3.4–10.8)
WBC NRBC COR # BLD: 5.2 10*3/MM3 (ref 3.4–10.8)
WBC NRBC COR # BLD: 6.1 10*3/MM3 (ref 3.4–10.8)
WBC NRBC COR # BLD: 7.5 10*3/MM3 (ref 3.4–10.8)
WBC NRBC COR # BLD: 7.7 10*3/MM3 (ref 3.4–10.8)
WBC NRBC COR # BLD: 7.9 10*3/MM3 (ref 3.4–10.8)
WBC UR QL AUTO: ABNORMAL /HPF
WHOLE BLOOD HOLD SPECIMEN: NORMAL

## 2020-01-01 PROCEDURE — 85025 COMPLETE CBC W/AUTO DIFF WBC: CPT | Performed by: EMERGENCY MEDICINE

## 2020-01-01 PROCEDURE — 97535 SELF CARE MNGMENT TRAINING: CPT

## 2020-01-01 PROCEDURE — C9803 HOPD COVID-19 SPEC COLLECT: HCPCS

## 2020-01-01 PROCEDURE — G0378 HOSPITAL OBSERVATION PER HR: HCPCS

## 2020-01-01 PROCEDURE — 99214 OFFICE O/P EST MOD 30 MIN: CPT | Performed by: INTERNAL MEDICINE

## 2020-01-01 PROCEDURE — 83880 ASSAY OF NATRIURETIC PEPTIDE: CPT | Performed by: INTERNAL MEDICINE

## 2020-01-01 PROCEDURE — 84484 ASSAY OF TROPONIN QUANT: CPT | Performed by: EMERGENCY MEDICINE

## 2020-01-01 PROCEDURE — 93280 PM DEVICE PROGR EVAL DUAL: CPT | Performed by: INTERNAL MEDICINE

## 2020-01-01 PROCEDURE — 93294 REM INTERROG EVL PM/LDLS PM: CPT | Performed by: INTERNAL MEDICINE

## 2020-01-01 PROCEDURE — 81001 URINALYSIS AUTO W/SCOPE: CPT | Performed by: NURSE PRACTITIONER

## 2020-01-01 PROCEDURE — 82962 GLUCOSE BLOOD TEST: CPT

## 2020-01-01 PROCEDURE — 87635 SARS-COV-2 COVID-19 AMP PRB: CPT | Performed by: NURSE PRACTITIONER

## 2020-01-01 PROCEDURE — 63710000001 INSULIN GLARGINE PER 5 UNITS: Performed by: NURSE PRACTITIONER

## 2020-01-01 PROCEDURE — 93005 ELECTROCARDIOGRAM TRACING: CPT

## 2020-01-01 PROCEDURE — 93306 TTE W/DOPPLER COMPLETE: CPT

## 2020-01-01 PROCEDURE — 96375 TX/PRO/DX INJ NEW DRUG ADDON: CPT

## 2020-01-01 PROCEDURE — 87040 BLOOD CULTURE FOR BACTERIA: CPT | Performed by: NURSE PRACTITIONER

## 2020-01-01 PROCEDURE — 80053 COMPREHEN METABOLIC PANEL: CPT | Performed by: INTERNAL MEDICINE

## 2020-01-01 PROCEDURE — 80053 COMPREHEN METABOLIC PANEL: CPT | Performed by: EMERGENCY MEDICINE

## 2020-01-01 PROCEDURE — 83735 ASSAY OF MAGNESIUM: CPT | Performed by: INTERNAL MEDICINE

## 2020-01-01 PROCEDURE — 63710000001 INSULIN LISPRO (HUMAN) PER 5 UNITS: Performed by: HOSPITALIST

## 2020-01-01 PROCEDURE — 71045 X-RAY EXAM CHEST 1 VIEW: CPT

## 2020-01-01 PROCEDURE — 83880 ASSAY OF NATRIURETIC PEPTIDE: CPT | Performed by: EMERGENCY MEDICINE

## 2020-01-01 PROCEDURE — 85652 RBC SED RATE AUTOMATED: CPT | Performed by: INTERNAL MEDICINE

## 2020-01-01 PROCEDURE — 93005 ELECTROCARDIOGRAM TRACING: CPT | Performed by: INTERNAL MEDICINE

## 2020-01-01 PROCEDURE — 84484 ASSAY OF TROPONIN QUANT: CPT | Performed by: NURSE PRACTITIONER

## 2020-01-01 PROCEDURE — 85730 THROMBOPLASTIN TIME PARTIAL: CPT | Performed by: EMERGENCY MEDICINE

## 2020-01-01 PROCEDURE — 80048 BASIC METABOLIC PNL TOTAL CA: CPT | Performed by: NURSE PRACTITIONER

## 2020-01-01 PROCEDURE — 85025 COMPLETE CBC W/AUTO DIFF WBC: CPT | Performed by: NURSE PRACTITIONER

## 2020-01-01 PROCEDURE — 96374 THER/PROPH/DIAG INJ IV PUSH: CPT

## 2020-01-01 PROCEDURE — 63710000001 INSULIN GLARGINE PER 5 UNITS: Performed by: HOSPITALIST

## 2020-01-01 PROCEDURE — 97162 PT EVAL MOD COMPLEX 30 MIN: CPT

## 2020-01-01 PROCEDURE — 93005 ELECTROCARDIOGRAM TRACING: CPT | Performed by: NURSE PRACTITIONER

## 2020-01-01 PROCEDURE — 99284 EMERGENCY DEPT VISIT MOD MDM: CPT

## 2020-01-01 PROCEDURE — 80048 BASIC METABOLIC PNL TOTAL CA: CPT | Performed by: INTERNAL MEDICINE

## 2020-01-01 PROCEDURE — 99219 PR INITIAL OBSERVATION CARE/DAY 50 MINUTES: CPT | Performed by: NURSE PRACTITIONER

## 2020-01-01 PROCEDURE — 70450 CT HEAD/BRAIN W/O DYE: CPT

## 2020-01-01 PROCEDURE — 93880 EXTRACRANIAL BILAT STUDY: CPT

## 2020-01-01 PROCEDURE — 74176 CT ABD & PELVIS W/O CONTRAST: CPT

## 2020-01-01 PROCEDURE — 83880 ASSAY OF NATRIURETIC PEPTIDE: CPT | Performed by: NURSE PRACTITIONER

## 2020-01-01 PROCEDURE — 25010000002 FUROSEMIDE PER 20 MG: Performed by: EMERGENCY MEDICINE

## 2020-01-01 PROCEDURE — 93005 ELECTROCARDIOGRAM TRACING: CPT | Performed by: EMERGENCY MEDICINE

## 2020-01-01 PROCEDURE — 85025 COMPLETE CBC W/AUTO DIFF WBC: CPT | Performed by: INTERNAL MEDICINE

## 2020-01-01 PROCEDURE — 82550 ASSAY OF CK (CPK): CPT | Performed by: INTERNAL MEDICINE

## 2020-01-01 PROCEDURE — 63710000001 INSULIN LISPRO (HUMAN) PER 5 UNITS: Performed by: NURSE PRACTITIONER

## 2020-01-01 PROCEDURE — 93010 ELECTROCARDIOGRAM REPORT: CPT | Performed by: INTERNAL MEDICINE

## 2020-01-01 PROCEDURE — 85379 FIBRIN DEGRADATION QUANT: CPT | Performed by: NURSE PRACTITIONER

## 2020-01-01 PROCEDURE — 97530 THERAPEUTIC ACTIVITIES: CPT

## 2020-01-01 PROCEDURE — 99217 PR OBSERVATION CARE DISCHARGE MANAGEMENT: CPT | Performed by: HOSPITALIST

## 2020-01-01 PROCEDURE — 73560 X-RAY EXAM OF KNEE 1 OR 2: CPT

## 2020-01-01 PROCEDURE — 93296 REM INTERROG EVL PM/IDS: CPT | Performed by: INTERNAL MEDICINE

## 2020-01-01 PROCEDURE — 84484 ASSAY OF TROPONIN QUANT: CPT | Performed by: INTERNAL MEDICINE

## 2020-01-01 PROCEDURE — 83036 HEMOGLOBIN GLYCOSYLATED A1C: CPT | Performed by: INTERNAL MEDICINE

## 2020-01-01 PROCEDURE — 25010000003 POTASSIUM CHLORIDE 10 MEQ/100ML SOLUTION: Performed by: NURSE PRACTITIONER

## 2020-01-01 PROCEDURE — 73030 X-RAY EXAM OF SHOULDER: CPT

## 2020-01-01 PROCEDURE — 71110 X-RAY EXAM RIBS BIL 3 VIEWS: CPT

## 2020-01-01 PROCEDURE — 82607 VITAMIN B-12: CPT | Performed by: INTERNAL MEDICINE

## 2020-01-01 PROCEDURE — 82728 ASSAY OF FERRITIN: CPT | Performed by: INTERNAL MEDICINE

## 2020-01-01 PROCEDURE — 83735 ASSAY OF MAGNESIUM: CPT | Performed by: NURSE PRACTITIONER

## 2020-01-01 PROCEDURE — 96376 TX/PRO/DX INJ SAME DRUG ADON: CPT

## 2020-01-01 PROCEDURE — 82550 ASSAY OF CK (CPK): CPT | Performed by: NURSE PRACTITIONER

## 2020-01-01 PROCEDURE — 85027 COMPLETE CBC AUTOMATED: CPT | Performed by: NURSE PRACTITIONER

## 2020-01-01 PROCEDURE — 73521 X-RAY EXAM HIPS BI 2 VIEWS: CPT

## 2020-01-01 PROCEDURE — 99219 PR INITIAL OBSERVATION CARE/DAY 50 MINUTES: CPT | Performed by: HOSPITALIST

## 2020-01-01 PROCEDURE — 71111 X-RAY EXAM RIBS/CHEST4/> VWS: CPT

## 2020-01-01 PROCEDURE — 83615 LACTATE (LD) (LDH) ENZYME: CPT | Performed by: NURSE PRACTITIONER

## 2020-01-01 PROCEDURE — 87635 SARS-COV-2 COVID-19 AMP PRB: CPT | Performed by: EMERGENCY MEDICINE

## 2020-01-01 PROCEDURE — 97116 GAIT TRAINING THERAPY: CPT

## 2020-01-01 PROCEDURE — 83036 HEMOGLOBIN GLYCOSYLATED A1C: CPT | Performed by: NURSE PRACTITIONER

## 2020-01-01 PROCEDURE — 99213 OFFICE O/P EST LOW 20 MIN: CPT | Performed by: INTERNAL MEDICINE

## 2020-01-01 PROCEDURE — 99215 OFFICE O/P EST HI 40 MIN: CPT | Performed by: INTERNAL MEDICINE

## 2020-01-01 PROCEDURE — 84443 ASSAY THYROID STIM HORMONE: CPT | Performed by: INTERNAL MEDICINE

## 2020-01-01 PROCEDURE — 97163 PT EVAL HIGH COMPLEX 45 MIN: CPT

## 2020-01-01 PROCEDURE — 81003 URINALYSIS AUTO W/O SCOPE: CPT | Performed by: EMERGENCY MEDICINE

## 2020-01-01 PROCEDURE — 99204 OFFICE O/P NEW MOD 45 MIN: CPT | Performed by: INTERNAL MEDICINE

## 2020-01-01 PROCEDURE — 25010000002 SULFUR HEXAFLUORIDE MICROSPH 60.7-25 MG RECONSTITUTED SUSPENSION: Performed by: INTERNAL MEDICINE

## 2020-01-01 PROCEDURE — 25010000002 ONDANSETRON PER 1 MG: Performed by: NURSE PRACTITIONER

## 2020-01-01 PROCEDURE — 94618 PULMONARY STRESS TESTING: CPT

## 2020-01-01 PROCEDURE — 25010000002 DEXAMETHASONE SODIUM PHOSPHATE 10 MG/ML SOLUTION: Performed by: NURSE PRACTITIONER

## 2020-01-01 PROCEDURE — 84100 ASSAY OF PHOSPHORUS: CPT | Performed by: INTERNAL MEDICINE

## 2020-01-01 PROCEDURE — U0003 INFECTIOUS AGENT DETECTION BY NUCLEIC ACID (DNA OR RNA); SEVERE ACUTE RESPIRATORY SYNDROME CORONAVIRUS 2 (SARS-COV-2) (CORONAVIRUS DISEASE [COVID-19]), AMPLIFIED PROBE TECHNIQUE, MAKING USE OF HIGH THROUGHPUT TECHNOLOGIES AS DESCRIBED BY CMS-2020-01-R: HCPCS | Performed by: EMERGENCY MEDICINE

## 2020-01-01 PROCEDURE — 99212 OFFICE O/P EST SF 10 MIN: CPT | Performed by: INTERNAL MEDICINE

## 2020-01-01 PROCEDURE — 80053 COMPREHEN METABOLIC PANEL: CPT | Performed by: NURSE PRACTITIONER

## 2020-01-01 PROCEDURE — 80061 LIPID PANEL: CPT | Performed by: INTERNAL MEDICINE

## 2020-01-01 PROCEDURE — 85610 PROTHROMBIN TIME: CPT | Performed by: NURSE PRACTITIONER

## 2020-01-01 PROCEDURE — 25010000002 DEXAMETHASONE PER 1 MG: Performed by: NURSE PRACTITIONER

## 2020-01-01 PROCEDURE — 25010000002 METHYLPREDNISOLONE PER 125 MG: Performed by: EMERGENCY MEDICINE

## 2020-01-01 PROCEDURE — 97166 OT EVAL MOD COMPLEX 45 MIN: CPT

## 2020-01-01 PROCEDURE — 99285 EMERGENCY DEPT VISIT HI MDM: CPT

## 2020-01-01 PROCEDURE — 93970 EXTREMITY STUDY: CPT

## 2020-01-01 PROCEDURE — 99226 PR SBSQ OBSERVATION CARE/DAY 35 MINUTES: CPT | Performed by: INTERNAL MEDICINE

## 2020-01-01 PROCEDURE — 93306 TTE W/DOPPLER COMPLETE: CPT | Performed by: INTERNAL MEDICINE

## 2020-01-01 PROCEDURE — 86140 C-REACTIVE PROTEIN: CPT | Performed by: INTERNAL MEDICINE

## 2020-01-01 PROCEDURE — 73610 X-RAY EXAM OF ANKLE: CPT

## 2020-01-01 PROCEDURE — 86140 C-REACTIVE PROTEIN: CPT | Performed by: NURSE PRACTITIONER

## 2020-01-01 PROCEDURE — 25010000002 MORPHINE PER 10 MG: Performed by: NURSE PRACTITIONER

## 2020-01-01 PROCEDURE — 84145 PROCALCITONIN (PCT): CPT | Performed by: NURSE PRACTITIONER

## 2020-01-01 PROCEDURE — 73502 X-RAY EXAM HIP UNI 2-3 VIEWS: CPT

## 2020-01-01 PROCEDURE — 84443 ASSAY THYROID STIM HORMONE: CPT | Performed by: NURSE PRACTITIONER

## 2020-01-01 PROCEDURE — 73130 X-RAY EXAM OF HAND: CPT

## 2020-01-01 PROCEDURE — 96365 THER/PROPH/DIAG IV INF INIT: CPT

## 2020-01-01 PROCEDURE — 63710000001 INSULIN LISPRO (HUMAN) PER 5 UNITS: Performed by: INTERNAL MEDICINE

## 2020-01-01 PROCEDURE — 82728 ASSAY OF FERRITIN: CPT | Performed by: NURSE PRACTITIONER

## 2020-01-01 PROCEDURE — 84550 ASSAY OF BLOOD/URIC ACID: CPT | Performed by: INTERNAL MEDICINE

## 2020-01-01 PROCEDURE — 72125 CT NECK SPINE W/O DYE: CPT

## 2020-01-01 PROCEDURE — 99217 PR OBSERVATION CARE DISCHARGE MANAGEMENT: CPT | Performed by: INTERNAL MEDICINE

## 2020-01-01 RX ORDER — ACETAMINOPHEN 650 MG/1
650 SUPPOSITORY RECTAL EVERY 4 HOURS PRN
Status: DISCONTINUED | OUTPATIENT
Start: 2020-01-01 | End: 2020-01-01 | Stop reason: HOSPADM

## 2020-01-01 RX ORDER — APIXABAN 5 MG/1
TABLET, FILM COATED ORAL
Qty: 60 TABLET | Refills: 0 | Status: SHIPPED | OUTPATIENT
Start: 2020-01-01 | End: 2020-01-01 | Stop reason: SDUPTHER

## 2020-01-01 RX ORDER — MAGNESIUM SULFATE HEPTAHYDRATE 40 MG/ML
2 INJECTION, SOLUTION INTRAVENOUS AS NEEDED
Status: DISCONTINUED | OUTPATIENT
Start: 2020-01-01 | End: 2020-01-01 | Stop reason: HOSPADM

## 2020-01-01 RX ORDER — NICOTINE POLACRILEX 4 MG
15 LOZENGE BUCCAL
Status: DISCONTINUED | OUTPATIENT
Start: 2020-01-01 | End: 2020-01-01 | Stop reason: HOSPADM

## 2020-01-01 RX ORDER — PRAVASTATIN SODIUM 80 MG/1
80 TABLET ORAL DAILY
Qty: 90 TABLET | Refills: 1 | Status: SHIPPED | OUTPATIENT
Start: 2020-01-01

## 2020-01-01 RX ORDER — ONDANSETRON 2 MG/ML
4 INJECTION INTRAMUSCULAR; INTRAVENOUS EVERY 6 HOURS PRN
Status: DISCONTINUED | OUTPATIENT
Start: 2020-01-01 | End: 2020-01-01 | Stop reason: HOSPADM

## 2020-01-01 RX ORDER — MORPHINE SULFATE 4 MG/ML
2 INJECTION, SOLUTION INTRAMUSCULAR; INTRAVENOUS ONCE
Status: COMPLETED | OUTPATIENT
Start: 2020-01-01 | End: 2020-01-01

## 2020-01-01 RX ORDER — SODIUM CHLORIDE 0.9 % (FLUSH) 0.9 %
10 SYRINGE (ML) INJECTION EVERY 12 HOURS SCHEDULED
Status: DISCONTINUED | OUTPATIENT
Start: 2020-01-01 | End: 2020-01-01 | Stop reason: HOSPADM

## 2020-01-01 RX ORDER — FUROSEMIDE 20 MG/1
20 TABLET ORAL DAILY
Status: DISCONTINUED | OUTPATIENT
Start: 2020-01-01 | End: 2020-01-01 | Stop reason: HOSPADM

## 2020-01-01 RX ORDER — FUROSEMIDE 20 MG/1
20 TABLET ORAL DAILY
Status: DISCONTINUED | OUTPATIENT
Start: 2020-01-01 | End: 2020-01-01

## 2020-01-01 RX ORDER — DEXAMETHASONE SODIUM PHOSPHATE 4 MG/ML
2 INJECTION, SOLUTION INTRA-ARTICULAR; INTRALESIONAL; INTRAMUSCULAR; INTRAVENOUS; SOFT TISSUE ONCE
Status: COMPLETED | OUTPATIENT
Start: 2020-01-01 | End: 2020-01-01

## 2020-01-01 RX ORDER — TIZANIDINE 4 MG/1
4 TABLET ORAL NIGHTLY PRN
Status: DISCONTINUED | OUTPATIENT
Start: 2020-01-01 | End: 2020-01-01 | Stop reason: HOSPADM

## 2020-01-01 RX ORDER — METOPROLOL SUCCINATE 25 MG/1
25 TABLET, EXTENDED RELEASE ORAL DAILY
Qty: 30 TABLET | Refills: 0 | Status: SHIPPED | OUTPATIENT
Start: 2020-01-01 | End: 2020-01-01 | Stop reason: HOSPADM

## 2020-01-01 RX ORDER — FOLIC ACID 1 MG/1
1 TABLET ORAL DAILY
Status: DISCONTINUED | OUTPATIENT
Start: 2020-01-01 | End: 2020-01-01 | Stop reason: HOSPADM

## 2020-01-01 RX ORDER — AZITHROMYCIN 250 MG/1
500 TABLET, FILM COATED ORAL ONCE
Status: COMPLETED | OUTPATIENT
Start: 2020-01-01 | End: 2020-01-01

## 2020-01-01 RX ORDER — TAMSULOSIN HYDROCHLORIDE 0.4 MG/1
0.4 CAPSULE ORAL DAILY
Status: DISCONTINUED | OUTPATIENT
Start: 2020-01-01 | End: 2020-01-01 | Stop reason: HOSPADM

## 2020-01-01 RX ORDER — HYDROCODONE BITARTRATE AND ACETAMINOPHEN 10; 325 MG/1; MG/1
1 TABLET ORAL EVERY 6 HOURS PRN
COMMUNITY

## 2020-01-01 RX ORDER — MULTIPLE VITAMINS W/ MINERALS TAB 2148-113
1 TAB ORAL DAILY
Status: DISCONTINUED | OUTPATIENT
Start: 2020-01-01 | End: 2020-01-01 | Stop reason: HOSPADM

## 2020-01-01 RX ORDER — ASCORBIC ACID 500 MG
500 TABLET ORAL 2 TIMES DAILY
Status: DISCONTINUED | OUTPATIENT
Start: 2020-01-01 | End: 2020-01-01 | Stop reason: HOSPADM

## 2020-01-01 RX ORDER — HYDROXYCHLOROQUINE SULFATE 200 MG/1
200 TABLET, FILM COATED ORAL 2 TIMES DAILY
COMMUNITY
End: 2020-01-01 | Stop reason: SDUPTHER

## 2020-01-01 RX ORDER — ONDANSETRON 4 MG/1
4 TABLET, FILM COATED ORAL EVERY 6 HOURS PRN
Status: DISCONTINUED | OUTPATIENT
Start: 2020-01-01 | End: 2020-01-01 | Stop reason: HOSPADM

## 2020-01-01 RX ORDER — FUROSEMIDE 10 MG/ML
40 INJECTION INTRAMUSCULAR; INTRAVENOUS ONCE
Status: COMPLETED | OUTPATIENT
Start: 2020-01-01 | End: 2020-01-01

## 2020-01-01 RX ORDER — METOPROLOL SUCCINATE 25 MG/1
25 TABLET, EXTENDED RELEASE ORAL DAILY
Status: DISCONTINUED | OUTPATIENT
Start: 2020-01-01 | End: 2020-01-01 | Stop reason: HOSPADM

## 2020-01-01 RX ORDER — HYDROXYCHLOROQUINE SULFATE 200 MG/1
400 TABLET, FILM COATED ORAL DAILY
Qty: 60 TABLET | Refills: 2 | Status: SHIPPED | OUTPATIENT
Start: 2020-01-01

## 2020-01-01 RX ORDER — ALBUTEROL SULFATE 90 UG/1
2 AEROSOL, METERED RESPIRATORY (INHALATION) EVERY 4 HOURS PRN
Status: DISCONTINUED | OUTPATIENT
Start: 2020-01-01 | End: 2020-01-01 | Stop reason: HOSPADM

## 2020-01-01 RX ORDER — SODIUM CHLORIDE 0.9 % (FLUSH) 0.9 %
10 SYRINGE (ML) INJECTION AS NEEDED
Status: DISCONTINUED | OUTPATIENT
Start: 2020-01-01 | End: 2020-01-01 | Stop reason: HOSPADM

## 2020-01-01 RX ORDER — FUROSEMIDE 20 MG/1
TABLET ORAL
COMMUNITY
Start: 2020-01-01

## 2020-01-01 RX ORDER — PANTOPRAZOLE SODIUM 40 MG/1
40 TABLET, DELAYED RELEASE ORAL EVERY MORNING
Status: DISCONTINUED | OUTPATIENT
Start: 2020-01-01 | End: 2020-01-01 | Stop reason: HOSPADM

## 2020-01-01 RX ORDER — ACETAMINOPHEN 325 MG/1
650 TABLET ORAL EVERY 4 HOURS PRN
Status: DISCONTINUED | OUTPATIENT
Start: 2020-01-01 | End: 2020-01-01 | Stop reason: HOSPADM

## 2020-01-01 RX ORDER — POTASSIUM CHLORIDE 20 MEQ/1
40 TABLET, EXTENDED RELEASE ORAL AS NEEDED
Status: DISCONTINUED | OUTPATIENT
Start: 2020-01-01 | End: 2020-01-01 | Stop reason: HOSPADM

## 2020-01-01 RX ORDER — PRAVASTATIN SODIUM 40 MG
80 TABLET ORAL DAILY
Qty: 90 TABLET | Refills: 1 | Status: SHIPPED | OUTPATIENT
Start: 2020-01-01 | End: 2020-01-01 | Stop reason: DRUGHIGH

## 2020-01-01 RX ORDER — LEVOTHYROXINE SODIUM 0.05 MG/1
50 TABLET ORAL
Status: DISCONTINUED | OUTPATIENT
Start: 2020-01-01 | End: 2020-01-01 | Stop reason: HOSPADM

## 2020-01-01 RX ORDER — HYDROXYCHLOROQUINE SULFATE 200 MG/1
TABLET, FILM COATED ORAL
Qty: 60 TABLET | Refills: 2 | Status: SHIPPED | OUTPATIENT
Start: 2020-01-01 | End: 2020-01-01

## 2020-01-01 RX ORDER — ACETAMINOPHEN 160 MG/5ML
650 SOLUTION ORAL EVERY 4 HOURS PRN
Status: DISCONTINUED | OUTPATIENT
Start: 2020-01-01 | End: 2020-01-01 | Stop reason: HOSPADM

## 2020-01-01 RX ORDER — POTASSIUM CHLORIDE 7.45 MG/ML
10 INJECTION INTRAVENOUS
Status: DISCONTINUED | OUTPATIENT
Start: 2020-01-01 | End: 2020-01-01 | Stop reason: HOSPADM

## 2020-01-01 RX ORDER — PREDNISONE 50 MG/1
50 TABLET ORAL DAILY
Qty: 5 TABLET | Refills: 0 | Status: SHIPPED | OUTPATIENT
Start: 2020-01-01 | End: 2020-01-01

## 2020-01-01 RX ORDER — ATORVASTATIN CALCIUM 20 MG/1
20 TABLET, FILM COATED ORAL NIGHTLY
Status: DISCONTINUED | OUTPATIENT
Start: 2020-01-01 | End: 2020-01-01 | Stop reason: HOSPADM

## 2020-01-01 RX ORDER — METOPROLOL SUCCINATE 25 MG/1
25 TABLET, EXTENDED RELEASE ORAL DAILY
Qty: 90 TABLET | Refills: 1 | Status: SHIPPED | OUTPATIENT
Start: 2020-01-01

## 2020-01-01 RX ORDER — INSULIN GLARGINE 100 [IU]/ML
30 INJECTION, SOLUTION SUBCUTANEOUS NIGHTLY
Status: DISCONTINUED | OUTPATIENT
Start: 2020-01-01 | End: 2020-01-01 | Stop reason: HOSPADM

## 2020-01-01 RX ORDER — FLUOXETINE 10 MG/1
10 CAPSULE ORAL DAILY
Status: DISCONTINUED | OUTPATIENT
Start: 2020-01-01 | End: 2020-01-01 | Stop reason: HOSPADM

## 2020-01-01 RX ORDER — MULTIVITAMIN WITH IRON
100 TABLET ORAL DAILY
Status: DISCONTINUED | OUTPATIENT
Start: 2020-01-01 | End: 2020-01-01 | Stop reason: HOSPADM

## 2020-01-01 RX ORDER — DEXAMETHASONE SODIUM PHOSPHATE 10 MG/ML
6 INJECTION, SOLUTION INTRAMUSCULAR; INTRAVENOUS DAILY
Status: DISCONTINUED | OUTPATIENT
Start: 2020-01-01 | End: 2020-01-01 | Stop reason: HOSPADM

## 2020-01-01 RX ORDER — GABAPENTIN 400 MG/1
400 CAPSULE ORAL 2 TIMES DAILY
Status: DISCONTINUED | OUTPATIENT
Start: 2020-01-01 | End: 2020-01-01 | Stop reason: HOSPADM

## 2020-01-01 RX ORDER — AZITHROMYCIN 250 MG/1
TABLET, FILM COATED ORAL
Qty: 6 TABLET | Refills: 0 | Status: SHIPPED | OUTPATIENT
Start: 2020-01-01 | End: 2020-01-01

## 2020-01-01 RX ORDER — MAGNESIUM SULFATE HEPTAHYDRATE 40 MG/ML
4 INJECTION, SOLUTION INTRAVENOUS AS NEEDED
Status: DISCONTINUED | OUTPATIENT
Start: 2020-01-01 | End: 2020-01-01 | Stop reason: HOSPADM

## 2020-01-01 RX ORDER — LABETALOL HYDROCHLORIDE 5 MG/ML
10 INJECTION, SOLUTION INTRAVENOUS ONCE
Status: COMPLETED | OUTPATIENT
Start: 2020-01-01 | End: 2020-01-01

## 2020-01-01 RX ORDER — LABETALOL HYDROCHLORIDE 5 MG/ML
20 INJECTION, SOLUTION INTRAVENOUS EVERY 4 HOURS PRN
Status: DISCONTINUED | OUTPATIENT
Start: 2020-01-01 | End: 2020-01-01 | Stop reason: HOSPADM

## 2020-01-01 RX ORDER — CEFDINIR 300 MG/1
300 CAPSULE ORAL 2 TIMES DAILY
Qty: 10 CAPSULE | Refills: 0 | Status: SHIPPED | OUTPATIENT
Start: 2020-01-01

## 2020-01-01 RX ORDER — DIAZEPAM 5 MG/1
5 TABLET ORAL EVERY 8 HOURS PRN
Status: DISCONTINUED | OUTPATIENT
Start: 2020-01-01 | End: 2020-01-01 | Stop reason: HOSPADM

## 2020-01-01 RX ORDER — TAMSULOSIN HYDROCHLORIDE 0.4 MG/1
0.4 CAPSULE ORAL NIGHTLY
Status: DISCONTINUED | OUTPATIENT
Start: 2020-01-01 | End: 2020-01-01 | Stop reason: HOSPADM

## 2020-01-01 RX ORDER — ONDANSETRON 2 MG/ML
4 INJECTION INTRAMUSCULAR; INTRAVENOUS ONCE
Status: COMPLETED | OUTPATIENT
Start: 2020-01-01 | End: 2020-01-01

## 2020-01-01 RX ORDER — FLUOXETINE HYDROCHLORIDE 20 MG/1
20 CAPSULE ORAL DAILY
Status: DISCONTINUED | OUTPATIENT
Start: 2020-01-01 | End: 2020-01-01 | Stop reason: HOSPADM

## 2020-01-01 RX ORDER — METOPROLOL SUCCINATE 25 MG/1
25 TABLET, EXTENDED RELEASE ORAL DAILY
Qty: 90 TABLET | Refills: 1 | Status: SHIPPED | OUTPATIENT
Start: 2020-01-01 | End: 2020-01-01 | Stop reason: SDUPTHER

## 2020-01-01 RX ORDER — DEXAMETHASONE SODIUM PHOSPHATE 4 MG/ML
4 INJECTION, SOLUTION INTRA-ARTICULAR; INTRALESIONAL; INTRAMUSCULAR; INTRAVENOUS; SOFT TISSUE ONCE
Status: COMPLETED | OUTPATIENT
Start: 2020-01-01 | End: 2020-01-01

## 2020-01-01 RX ORDER — HYDROCODONE BITARTRATE AND ACETAMINOPHEN 7.5; 325 MG/1; MG/1
1 TABLET ORAL EVERY 6 HOURS PRN
Status: DISCONTINUED | OUTPATIENT
Start: 2020-01-01 | End: 2020-01-01 | Stop reason: HOSPADM

## 2020-01-01 RX ORDER — POTASSIUM CHLORIDE 20 MEQ/1
20 TABLET, EXTENDED RELEASE ORAL ONCE
Status: COMPLETED | OUTPATIENT
Start: 2020-01-01 | End: 2020-01-01

## 2020-01-01 RX ORDER — DEXTROSE MONOHYDRATE 25 G/50ML
25 INJECTION, SOLUTION INTRAVENOUS
Status: DISCONTINUED | OUTPATIENT
Start: 2020-01-01 | End: 2020-01-01 | Stop reason: HOSPADM

## 2020-01-01 RX ORDER — HYDROXYCHLOROQUINE SULFATE 200 MG/1
TABLET, FILM COATED ORAL
Qty: 60 TABLET | Refills: 2 | Status: CANCELLED | OUTPATIENT
Start: 2020-01-01

## 2020-01-01 RX ORDER — HYDRALAZINE HYDROCHLORIDE 25 MG/1
50 TABLET, FILM COATED ORAL 3 TIMES DAILY
Status: DISCONTINUED | OUTPATIENT
Start: 2020-01-01 | End: 2020-01-01

## 2020-01-01 RX ORDER — INSULIN GLARGINE 100 [IU]/ML
36 INJECTION, SOLUTION SUBCUTANEOUS NIGHTLY
Status: DISCONTINUED | OUTPATIENT
Start: 2020-01-01 | End: 2020-01-01

## 2020-01-01 RX ORDER — AZITHROMYCIN 250 MG/1
TABLET, FILM COATED ORAL
Qty: 6 TABLET | Refills: 0 | OUTPATIENT
Start: 2020-01-01 | End: 2020-01-01

## 2020-01-01 RX ORDER — METHYLPREDNISOLONE SODIUM SUCCINATE 125 MG/2ML
125 INJECTION, POWDER, LYOPHILIZED, FOR SOLUTION INTRAMUSCULAR; INTRAVENOUS ONCE
Status: COMPLETED | OUTPATIENT
Start: 2020-01-01 | End: 2020-01-01

## 2020-01-01 RX ORDER — POTASSIUM CHLORIDE 20 MEQ/1
40 TABLET, EXTENDED RELEASE ORAL ONCE
Status: DISCONTINUED | OUTPATIENT
Start: 2020-01-01 | End: 2020-01-01 | Stop reason: HOSPADM

## 2020-01-01 RX ORDER — HYDROCODONE BITARTRATE AND ACETAMINOPHEN 10; 325 MG/1; MG/1
1 TABLET ORAL EVERY 6 HOURS PRN
Status: DISCONTINUED | OUTPATIENT
Start: 2020-01-01 | End: 2020-01-01 | Stop reason: HOSPADM

## 2020-01-01 RX ORDER — HYDROXYCHLOROQUINE SULFATE 200 MG/1
400 TABLET, FILM COATED ORAL DAILY
Status: DISCONTINUED | OUTPATIENT
Start: 2020-01-01 | End: 2020-01-01 | Stop reason: HOSPADM

## 2020-01-01 RX ORDER — DIAZEPAM 2 MG/1
5 TABLET ORAL EVERY 8 HOURS PRN
Status: DISCONTINUED | OUTPATIENT
Start: 2020-01-01 | End: 2020-01-01 | Stop reason: HOSPADM

## 2020-01-01 RX ORDER — INSULIN GLARGINE 100 [IU]/ML
80 INJECTION, SOLUTION SUBCUTANEOUS EVERY MORNING
Status: DISCONTINUED | OUTPATIENT
Start: 2020-01-01 | End: 2020-01-01

## 2020-01-01 RX ORDER — PRAVASTATIN SODIUM 80 MG/1
TABLET ORAL
Qty: 30 TABLET | Refills: 5 | Status: SHIPPED | OUTPATIENT
Start: 2020-01-01 | End: 2020-01-01 | Stop reason: SDUPTHER

## 2020-01-01 RX ORDER — ALBUTEROL SULFATE 2.5 MG/3ML
2.5 SOLUTION RESPIRATORY (INHALATION) EVERY 6 HOURS PRN
Status: DISCONTINUED | OUTPATIENT
Start: 2020-01-01 | End: 2020-01-01 | Stop reason: HOSPADM

## 2020-01-01 RX ORDER — MONTELUKAST SODIUM 10 MG/1
10 TABLET ORAL NIGHTLY
Status: DISCONTINUED | OUTPATIENT
Start: 2020-01-01 | End: 2020-01-01 | Stop reason: HOSPADM

## 2020-01-01 RX ORDER — DEXAMETHASONE 4 MG/1
4 TABLET ORAL
Qty: 5 TABLET | Refills: 0 | Status: SHIPPED | OUTPATIENT
Start: 2020-01-01

## 2020-01-01 RX ORDER — PRAVASTATIN SODIUM 40 MG
80 TABLET ORAL DAILY
COMMUNITY
End: 2020-01-01 | Stop reason: SDUPTHER

## 2020-01-01 RX ORDER — ZINC GLUCONATE 50 MG
100 TABLET ORAL DAILY
Status: DISCONTINUED | OUTPATIENT
Start: 2020-01-01 | End: 2020-01-01 | Stop reason: HOSPADM

## 2020-01-01 RX ORDER — INSULIN GLARGINE 100 [IU]/ML
36 INJECTION, SOLUTION SUBCUTANEOUS NIGHTLY
Refills: 12
Start: 2020-01-01

## 2020-01-01 RX ORDER — PRAVASTATIN SODIUM 40 MG
TABLET ORAL
Qty: 90 TABLET | Refills: 1 | OUTPATIENT
Start: 2020-01-01

## 2020-01-01 RX ORDER — PINDOLOL 5 MG/1
5 TABLET ORAL 2 TIMES DAILY
Qty: 60 TABLET | Refills: 0 | Status: SHIPPED | OUTPATIENT
Start: 2020-01-01 | End: 2020-01-01 | Stop reason: ALTCHOICE

## 2020-01-01 RX ORDER — INSULIN GLARGINE 100 [IU]/ML
36 INJECTION, SOLUTION SUBCUTANEOUS NIGHTLY
Status: DISCONTINUED | OUTPATIENT
Start: 2020-01-01 | End: 2020-01-01 | Stop reason: HOSPADM

## 2020-01-01 RX ADMIN — METHYLPREDNISOLONE SODIUM SUCCINATE 125 MG: 125 INJECTION, POWDER, FOR SOLUTION INTRAMUSCULAR; INTRAVENOUS at 15:37

## 2020-01-01 RX ADMIN — INSULIN GLARGINE 36 UNITS: 100 INJECTION, SOLUTION SUBCUTANEOUS at 21:46

## 2020-01-01 RX ADMIN — APIXABAN 5 MG: 5 TABLET, FILM COATED ORAL at 09:41

## 2020-01-01 RX ADMIN — Medication 10 ML: at 21:46

## 2020-01-01 RX ADMIN — PANTOPRAZOLE SODIUM 40 MG: 40 TABLET, DELAYED RELEASE ORAL at 06:06

## 2020-01-01 RX ADMIN — INSULIN LISPRO 6 UNITS: 100 INJECTION, SOLUTION INTRAVENOUS; SUBCUTANEOUS at 12:02

## 2020-01-01 RX ADMIN — ACETAMINOPHEN 650 MG: 325 TABLET, FILM COATED ORAL at 18:19

## 2020-01-01 RX ADMIN — SODIUM CHLORIDE 1000 ML: 900 INJECTION, SOLUTION INTRAVENOUS at 12:10

## 2020-01-01 RX ADMIN — INSULIN LISPRO 8 UNITS: 100 INJECTION, SOLUTION INTRAVENOUS; SUBCUTANEOUS at 08:02

## 2020-01-01 RX ADMIN — INSULIN LISPRO 8 UNITS: 100 INJECTION, SOLUTION INTRAVENOUS; SUBCUTANEOUS at 09:10

## 2020-01-01 RX ADMIN — POTASSIUM CHLORIDE 10 MEQ: 7.46 INJECTION, SOLUTION INTRAVENOUS at 12:19

## 2020-01-01 RX ADMIN — HYDROXYCHLOROQUINE SULFATE 400 MG: 200 TABLET, FILM COATED ORAL at 08:03

## 2020-01-01 RX ADMIN — GABAPENTIN 400 MG: 400 CAPSULE ORAL at 21:08

## 2020-01-01 RX ADMIN — APIXABAN 5 MG: 5 TABLET, FILM COATED ORAL at 21:46

## 2020-01-01 RX ADMIN — TAMSULOSIN HYDROCHLORIDE 0.4 MG: 0.4 CAPSULE ORAL at 21:08

## 2020-01-01 RX ADMIN — HYDROCODONE BITARTRATE AND ACETAMINOPHEN 1 TABLET: 7.5; 325 TABLET ORAL at 12:02

## 2020-01-01 RX ADMIN — INSULIN LISPRO 12 UNITS: 100 INJECTION, SOLUTION INTRAVENOUS; SUBCUTANEOUS at 08:23

## 2020-01-01 RX ADMIN — FLUOXETINE 10 MG: 10 CAPSULE ORAL at 09:42

## 2020-01-01 RX ADMIN — LEVOTHYROXINE SODIUM 50 MCG: 50 TABLET ORAL at 06:13

## 2020-01-01 RX ADMIN — TAMSULOSIN HYDROCHLORIDE 0.4 MG: 0.4 CAPSULE ORAL at 21:01

## 2020-01-01 RX ADMIN — FOLIC ACID 1 MG: 1 TABLET ORAL at 09:41

## 2020-01-01 RX ADMIN — SULFUR HEXAFLUORIDE 4 ML: KIT at 15:00

## 2020-01-01 RX ADMIN — FUROSEMIDE 20 MG: 20 TABLET ORAL at 08:03

## 2020-01-01 RX ADMIN — HYDROCODONE BITARTRATE AND ACETAMINOPHEN 1 TABLET: 7.5; 325 TABLET ORAL at 21:00

## 2020-01-01 RX ADMIN — GABAPENTIN 400 MG: 400 CAPSULE ORAL at 09:07

## 2020-01-01 RX ADMIN — OXYCODONE HYDROCHLORIDE AND ACETAMINOPHEN 500 MG: 500 TABLET ORAL at 21:46

## 2020-01-01 RX ADMIN — INSULIN LISPRO 8 UNITS: 100 INJECTION, SOLUTION INTRAVENOUS; SUBCUTANEOUS at 12:13

## 2020-01-01 RX ADMIN — INSULIN LISPRO 4 UNITS: 100 INJECTION, SOLUTION INTRAVENOUS; SUBCUTANEOUS at 09:40

## 2020-01-01 RX ADMIN — GABAPENTIN 400 MG: 400 CAPSULE ORAL at 09:41

## 2020-01-01 RX ADMIN — METOPROLOL SUCCINATE 25 MG: 25 TABLET, EXTENDED RELEASE ORAL at 08:02

## 2020-01-01 RX ADMIN — Medication 10 ML: at 08:04

## 2020-01-01 RX ADMIN — PANTOPRAZOLE SODIUM 40 MG: 40 TABLET, DELAYED RELEASE ORAL at 15:07

## 2020-01-01 RX ADMIN — OXYCODONE HYDROCHLORIDE AND ACETAMINOPHEN 500 MG: 500 TABLET ORAL at 08:02

## 2020-01-01 RX ADMIN — PANTOPRAZOLE SODIUM 40 MG: 40 TABLET, DELAYED RELEASE ORAL at 06:13

## 2020-01-01 RX ADMIN — POTASSIUM CHLORIDE 20 MEQ: 1500 TABLET, EXTENDED RELEASE ORAL at 03:48

## 2020-01-01 RX ADMIN — Medication 10 ML: at 09:11

## 2020-01-01 RX ADMIN — HYDRALAZINE HYDROCHLORIDE 50 MG: 25 TABLET, FILM COATED ORAL at 21:01

## 2020-01-01 RX ADMIN — HYDROCODONE BITARTRATE AND ACETAMINOPHEN 1 TABLET: 7.5; 325 TABLET ORAL at 03:05

## 2020-01-01 RX ADMIN — FOLIC ACID 1 MG: 1 TABLET ORAL at 09:07

## 2020-01-01 RX ADMIN — FOLIC ACID 1 MG: 1 TABLET ORAL at 15:07

## 2020-01-01 RX ADMIN — ONDANSETRON 4 MG: 2 INJECTION INTRAMUSCULAR; INTRAVENOUS at 10:40

## 2020-01-01 RX ADMIN — INSULIN LISPRO 25 UNITS: 100 INJECTION, SOLUTION INTRAVENOUS; SUBCUTANEOUS at 15:54

## 2020-01-01 RX ADMIN — FLUOXETINE 10 MG: 10 CAPSULE ORAL at 09:07

## 2020-01-01 RX ADMIN — MONTELUKAST SODIUM 10 MG: 10 TABLET, COATED ORAL at 21:46

## 2020-01-01 RX ADMIN — Medication 100 MG: at 08:02

## 2020-01-01 RX ADMIN — HYDROCODONE BITARTRATE AND ACETAMINOPHEN 1 TABLET: 7.5; 325 TABLET ORAL at 22:14

## 2020-01-01 RX ADMIN — HYDROCODONE BITARTRATE AND ACETAMINOPHEN 1 TABLET: 7.5; 325 TABLET ORAL at 17:53

## 2020-01-01 RX ADMIN — MULTIPLE VITAMINS W/ MINERALS TAB 1 TABLET: TAB at 08:03

## 2020-01-01 RX ADMIN — Medication 5000 UNITS: at 18:07

## 2020-01-01 RX ADMIN — LABETALOL 20 MG/4 ML (5 MG/ML) INTRAVENOUS SYRINGE 10 MG: at 23:44

## 2020-01-01 RX ADMIN — APIXABAN 5 MG: 5 TABLET, FILM COATED ORAL at 21:08

## 2020-01-01 RX ADMIN — FUROSEMIDE 40 MG: 10 INJECTION, SOLUTION INTRAMUSCULAR; INTRAVENOUS at 15:36

## 2020-01-01 RX ADMIN — LEVOTHYROXINE SODIUM 50 MCG: 50 TABLET ORAL at 05:19

## 2020-01-01 RX ADMIN — APIXABAN 5 MG: 5 TABLET, FILM COATED ORAL at 08:04

## 2020-01-01 RX ADMIN — TAMSULOSIN HYDROCHLORIDE 0.4 MG: 0.4 CAPSULE ORAL at 08:04

## 2020-01-01 RX ADMIN — Medication 10 ML: at 09:42

## 2020-01-01 RX ADMIN — LABETALOL 20 MG/4 ML (5 MG/ML) INTRAVENOUS SYRINGE 20 MG: at 12:36

## 2020-01-01 RX ADMIN — HYDROCODONE BITARTRATE AND ACETAMINOPHEN 1 TABLET: 7.5; 325 TABLET ORAL at 16:18

## 2020-01-01 RX ADMIN — FLUOXETINE 20 MG: 20 CAPSULE ORAL at 08:04

## 2020-01-01 RX ADMIN — HYDROCODONE BITARTRATE AND ACETAMINOPHEN 1 TABLET: 7.5; 325 TABLET ORAL at 09:17

## 2020-01-01 RX ADMIN — HYDROCODONE BITARTRATE AND ACETAMINOPHEN 1 TABLET: 10; 325 TABLET ORAL at 16:16

## 2020-01-01 RX ADMIN — LEVOTHYROXINE SODIUM 50 MCG: 50 TABLET ORAL at 05:18

## 2020-01-01 RX ADMIN — HYDROCODONE BITARTRATE AND ACETAMINOPHEN 1 TABLET: 7.5; 325 TABLET ORAL at 04:06

## 2020-01-01 RX ADMIN — PANTOPRAZOLE SODIUM 40 MG: 40 TABLET, DELAYED RELEASE ORAL at 09:42

## 2020-01-01 RX ADMIN — MORPHINE SULFATE 2 MG: 4 INJECTION INTRAVENOUS at 10:40

## 2020-01-01 RX ADMIN — FLUOXETINE 10 MG: 10 CAPSULE ORAL at 15:07

## 2020-01-01 RX ADMIN — DEXAMETHASONE SODIUM PHOSPHATE 2 MG: 4 INJECTION, SOLUTION INTRAMUSCULAR; INTRAVENOUS at 18:06

## 2020-01-01 RX ADMIN — FOLIC ACID 1 MG: 1 TABLET ORAL at 08:04

## 2020-01-01 RX ADMIN — APIXABAN 5 MG: 5 TABLET, FILM COATED ORAL at 21:01

## 2020-01-01 RX ADMIN — INSULIN LISPRO 4 UNITS: 100 INJECTION, SOLUTION INTRAVENOUS; SUBCUTANEOUS at 17:30

## 2020-01-01 RX ADMIN — Medication 100 MG: at 08:03

## 2020-01-01 RX ADMIN — AZITHROMYCIN MONOHYDRATE 500 MG: 250 TABLET ORAL at 13:41

## 2020-01-01 RX ADMIN — GABAPENTIN 400 MG: 400 CAPSULE ORAL at 08:03

## 2020-01-01 RX ADMIN — Medication 100 MG: at 18:07

## 2020-01-01 RX ADMIN — HYDRALAZINE HYDROCHLORIDE 50 MG: 25 TABLET, FILM COATED ORAL at 15:06

## 2020-01-01 RX ADMIN — Medication 10 ML: at 21:03

## 2020-01-01 RX ADMIN — INSULIN LISPRO 8 UNITS: 100 INJECTION, SOLUTION INTRAVENOUS; SUBCUTANEOUS at 12:36

## 2020-01-01 RX ADMIN — HYDROCODONE BITARTRATE AND ACETAMINOPHEN 1 TABLET: 10; 325 TABLET ORAL at 23:06

## 2020-01-01 RX ADMIN — FUROSEMIDE 40 MG: 40 INJECTION, SOLUTION INTRAMUSCULAR; INTRAVENOUS at 16:54

## 2020-01-01 RX ADMIN — Medication 10 ML: at 15:07

## 2020-01-01 RX ADMIN — HYDROCODONE BITARTRATE AND ACETAMINOPHEN 1 TABLET: 7.5; 325 TABLET ORAL at 15:07

## 2020-01-01 RX ADMIN — INSULIN LISPRO 8 UNITS: 100 INJECTION, SOLUTION INTRAVENOUS; SUBCUTANEOUS at 18:07

## 2020-01-01 RX ADMIN — GABAPENTIN 400 MG: 400 CAPSULE ORAL at 21:46

## 2020-01-01 RX ADMIN — DEXAMETHASONE SODIUM PHOSPHATE 4 MG: 4 INJECTION, SOLUTION INTRAMUSCULAR; INTRAVENOUS at 13:15

## 2020-01-01 RX ADMIN — GABAPENTIN 400 MG: 400 CAPSULE ORAL at 21:01

## 2020-01-01 RX ADMIN — Medication 10 ML: at 21:10

## 2020-01-01 RX ADMIN — INSULIN LISPRO 6 UNITS: 100 INJECTION, SOLUTION INTRAVENOUS; SUBCUTANEOUS at 17:53

## 2020-01-01 RX ADMIN — APIXABAN 5 MG: 5 TABLET, FILM COATED ORAL at 09:07

## 2020-01-01 RX ADMIN — SODIUM CHLORIDE, SODIUM LACTATE, POTASSIUM CHLORIDE, AND CALCIUM CHLORIDE 1000 ML: 600; 310; 30; 20 INJECTION, SOLUTION INTRAVENOUS at 01:21

## 2020-01-01 RX ADMIN — Medication 5000 UNITS: at 08:03

## 2020-01-01 RX ADMIN — DEXAMETHASONE SODIUM PHOSPHATE 6 MG: 10 INJECTION, SOLUTION INTRAMUSCULAR; INTRAVENOUS at 08:03

## 2020-02-02 PROBLEM — R55 SYNCOPE AND COLLAPSE: Status: ACTIVE | Noted: 2020-01-01

## 2020-02-02 NOTE — ED PROVIDER NOTES
"Subjective   74-year-old male presents status post syncope x2.  He reports that his first syncopal episode was at home.  He then was in route to the hospital and when he stepped out of the car had a second syncopal episode.  Reports both episodes, patient stated, \"It all just went black.\" These were witnessed by his wife at bedside.  She reports that he has had multiple work-ups, his cardiologist is Dr. Ceja.  Reports that he is recently had his pacemaker interrogated.  He has multiple complaints: Forehead abrasion, laceration to the right earlobe, neck pain, right shoulder pain, right fourth DIP, bilateral ribs, bilateral hips and pelvis, right knee, left ankle pain.    1. Location: multiple, see above  2. Quality: sore  3. Severity: mild  4. Worsening factors: movement, palpation  5. Alleviating factors: denies  6. Onset: PTA  7. Radiation: denies  8. Frequency: constant with periods of intensity  9. Co-morbidities: Past Medical History:  No date: Chronic kidney disease  No date: Sjogren's syndrome (CMS/HCC)  Also reports: syncope  10. Source: patient and spouse at bedside            Review of Systems   Constitutional: Negative for chills, diaphoresis and fever.   HENT: Negative for ear discharge and rhinorrhea.    Eyes: Negative for photophobia, pain and visual disturbance.   Respiratory: Negative for chest tightness and shortness of breath.    Cardiovascular: Negative for chest pain and palpitations.   Musculoskeletal: Positive for gait problem.        Ambulates with cane   Skin: Positive for wound. Negative for color change, pallor and rash.   Neurological: Positive for syncope. Negative for dizziness, speech difficulty, weakness, numbness and headaches.   Psychiatric/Behavioral: Negative for agitation and confusion.   All other systems reviewed and are negative.      Past Medical History:   Diagnosis Date   • Chronic kidney disease    • Sjogren's syndrome (CMS/HCC)        No Known Allergies    Past Surgical " History:   Procedure Laterality Date   • BACK SURGERY     • CATARACT EXTRACTION, BILATERAL     • HIP SURGERY  04/2012    total hip arthroplasty Rt hip   • LAPAROSCOPIC CHOLECYSTECTOMY     • NECK SURGERY     • TOTAL THYROIDECTOMY         Family History   Problem Relation Age of Onset   • Hypertension Mother    • Heart disease Mother    • Alzheimer's disease Father    • Heart disease Brother    • Hypertension Daughter    • Diabetes Daughter    • Heart disease Brother    • Arthritis Brother    • No Known Problems Brother    • No Known Problems Brother        Social History     Socioeconomic History   • Marital status:      Spouse name: Not on file   • Number of children: Not on file   • Years of education: Not on file   • Highest education level: Not on file   Tobacco Use   • Smoking status: Former Smoker     Types: Cigars   • Smokeless tobacco: Former User     Types: Chew   Substance and Sexual Activity   • Alcohol use: No     Frequency: Never   • Drug use: No           Objective   Physical Exam   Constitutional: He is oriented to person, place, and time. Vital signs are normal. He appears well-developed and well-nourished. He is active and cooperative.  Non-toxic appearance. No distress.   HENT:   Head: Normocephalic. Head is with abrasion. Head is without raccoon's eyes and without Hutchins's sign.       Right Ear: External ear normal. No drainage. No hemotympanum.   Left Ear: External ear normal. No drainage. No hemotympanum.   Nose: Nose normal. No rhinorrhea. No epistaxis.   Mouth/Throat: Uvula is midline, oropharynx is clear and moist and mucous membranes are normal.   Eyes: Pupils are equal, round, and reactive to light. Conjunctivae and EOM are normal. No scleral icterus.   Neck: Trachea normal, normal range of motion and phonation normal. Neck supple. Normal carotid pulses present. Spinous process tenderness present. Carotid bruit is not present. No tracheal deviation present.   Cardiovascular: Normal  rate, regular rhythm, S1 normal, S2 normal, normal heart sounds, intact distal pulses and normal pulses. Exam reveals no gallop and no friction rub.   No murmur heard.  Pulses:       Radial pulses are 2+ on the right side, and 2+ on the left side.        Dorsalis pedis pulses are 2+ on the right side, and 2+ on the left side.        Posterior tibial pulses are 2+ on the right side, and 2+ on the left side.   Pulmonary/Chest: Effort normal and breath sounds normal. No stridor. No respiratory distress. He has no wheezes. He has no rales. He exhibits tenderness and bony tenderness. He exhibits no crepitus and no swelling.       Abdominal: Soft. Bowel sounds are normal. He exhibits no distension and no mass. There is no tenderness. There is no guarding.   Musculoskeletal: Normal range of motion.        Right shoulder: He exhibits tenderness, bony tenderness and pain. He exhibits normal range of motion, no swelling, no effusion, no crepitus, no deformity, no laceration, no spasm, normal pulse and normal strength.        Right hip: He exhibits tenderness and bony tenderness. He exhibits normal range of motion, normal strength, no swelling, no crepitus, no deformity and no laceration.        Left hip: He exhibits tenderness and bony tenderness. He exhibits normal range of motion, normal strength, no swelling, no crepitus, no deformity and no laceration.        Left ankle: He exhibits normal range of motion, no swelling, no ecchymosis, no deformity, no laceration and normal pulse. Tenderness. Lateral malleolus tenderness found. No medial malleolus, no AITFL, no CF ligament, no posterior TFL, no head of 5th metatarsal and no proximal fibula tenderness found. Achilles tendon normal.        Cervical back: He exhibits tenderness, bony tenderness and pain. He exhibits normal range of motion, no swelling, no edema, no deformity, no laceration, no spasm and normal pulse.        Back:         Arms:       Right hand: He exhibits  tenderness. He exhibits normal range of motion, no bony tenderness, normal two-point discrimination, normal capillary refill, no deformity, no laceration and no swelling. Normal sensation noted. Normal strength noted.        Left hand: Normal.        Hands:  Neck: No bony step-off or overlying erythema noted.  No ecchymosis noted.            Neurological: He is alert and oriented to person, place, and time. He has normal strength. No cranial nerve deficit or sensory deficit. He exhibits normal muscle tone. GCS eye subscore is 4. GCS verbal subscore is 5. GCS motor subscore is 6.   Nothing focal noted on exam.    Skin: Skin is warm and dry. Capillary refill takes less than 2 seconds.   Psychiatric: He has a normal mood and affect. His behavior is normal. Judgment and thought content normal.   Nursing note and vitals reviewed.      Laceration Repair  Date/Time: 2/2/2020 2:47 AM  Performed by: Zoe Sarabia NP  Authorized by: Zoe Sarabia NP     Consent:     Consent obtained:  Verbal    Consent given by:  Patient    Risks discussed:  Poor cosmetic result and need for additional repair    Alternatives discussed:  Referral  Anesthesia (see MAR for exact dosages):     Anesthesia method:  Local infiltration    Local anesthetic:  Lidocaine 1% w/o epi  Laceration details:     Location:  Ear    Ear location:  R ear    Length (cm):  3  Repair type:     Repair type:  Intermediate  Pre-procedure details:     Preparation:  Patient was prepped and draped in usual sterile fashion  Exploration:     Hemostasis achieved with:  Direct pressure    Wound exploration: entire depth of wound probed and visualized      Contaminated: no    Treatment:     Area cleansed with:  Saline and Hibiclens    Amount of cleaning:  Standard    Irrigation solution:  Sterile saline    Irrigation method:  Tap    Visualized foreign bodies/material removed: no    Mucous membrane repair:     Suture size:  5-0    Suture material:  Vicryl    Suture  technique:  Horizontal mattress    Number of sutures:  1  Skin repair:     Repair method:  Sutures    Suture size:  6-0    Suture material:  Nylon    Suture technique:  Simple interrupted    Number of sutures:  8  Approximation:     Approximation:  Close  Post-procedure details:     Dressing:  Antibiotic ointment    Patient tolerance of procedure:  Tolerated well, no immediate complications               ED Course  ED Course as of Feb 02 0307   Sun Feb 02, 2020   0154 Previously 0.04 6/22/19.   Troponin T(!!): 0.048 [AL]   0155 1.96 2-months ago.   Creatinine(!): 2.64 [AL]   0159 Spoke with Brad Chavis [employer] Tech who gave the following results: intermittent A-Fib. Ventricular-paced rhythm (normal for this patient). Unchanged since previous interrogations.     [AL]      ED Course User Index  [AL] Zoe Sarabia, NP      Ct Head Without Contrast    Result Date: 2/2/2020  CT head: 1. Mild right frontal scalp soft tissue swelling. 2. No acute intracranial abnormality. 3. Mild volume loss and chronic microvascular ischemic changes. CT cervical spine: 1. No acute fracture or traumatic subluxation. 2. Moderate multilevel degenerative disc disease and facet arthropathy which is most pronounced at C3-4 with moderate to severe canal stenosis. Jevon Kauffman M.D. Neuroradiologist [email or web address] Thank you for this referral. This exam was interpreted by a fellowship trained neuroradiologist with subspeciality training in Neuroradiology. SLOT  68 Electronically signed by:  Jevon Kauffman M.D.  2/2/2020 12:46 AM    Ct Cervical Spine Without Contrast    Result Date: 2/2/2020  CT head: 1. Mild right frontal scalp soft tissue swelling. 2. No acute intracranial abnormality. 3. Mild volume loss and chronic microvascular ischemic changes. CT cervical spine: 1. No acute fracture or traumatic subluxation. 2. Moderate multilevel degenerative disc disease and facet arthropathy which is most pronounced at  C3-4 with moderate to severe canal stenosis. Jevon Kauffman M.D. Neuroradiologist [email or web address] Thank you for this referral. This exam was interpreted by a fellowship trained neuroradiologist with subspeciality training in Neuroradiology. SLOT  68 Electronically signed by:  Jevon Kauffman M.D.  2/2/2020 12:46 AM    Medications   sodium chloride 0.9 % flush 10 mL (has no administration in time range)   potassium chloride (K-DUR,KLOR-CON) CR tablet 20 mEq (has no administration in time range)   lactated ringers bolus 1,000 mL (1,000 mL Intravenous New Bag 2/2/20 0121)     Labs Reviewed   BASIC METABOLIC PANEL - Abnormal; Notable for the following components:       Result Value    Glucose 144 (*)     BUN 48 (*)     Creatinine 2.64 (*)     Potassium 3.0 (*)     Chloride 89 (*)     CO2 34.0 (*)     eGFR Non  Amer 24 (*)     All other components within normal limits    Narrative:     GFR Normal >60  Chronic Kidney Disease <60  Kidney Failure <15     PROTIME-INR - Abnormal; Notable for the following components:    INR 1.14 (*)     All other components within normal limits   TROPONIN (IN-HOUSE) - Abnormal; Notable for the following components:    Troponin T 0.048 (*)     All other components within normal limits    Narrative:     Troponin T Reference Range:  <= 0.03 ng/mL-   Negative for AMI  >0.03 ng/mL-     Abnormal for myocardial necrosis.  Clinicians would have to utilize clinical acumen, EKG, Troponin and serial changes to determine if it is an Acute Myocardial Infarction or myocardial injury due to an underlying chronic condition.       Results may be falsely decreased if patient taking Biotin.     CBC WITH AUTO DIFFERENTIAL - Abnormal; Notable for the following components:    Hemoglobin 12.3 (*)     Hematocrit 36.8 (*)     All other components within normal limits   POCT GLUCOSE FINGERSTICK - Abnormal; Notable for the following components:    Glucose 158 (*)     All other components  within normal limits   APTT - Normal   URINALYSIS W/ CULTURE IF INDICATED   CBC AND DIFFERENTIAL    Narrative:     The following orders were created for panel order CBC & Differential.  Procedure                               Abnormality         Status                     ---------                               -----------         ------                     CBC Auto Differential[[id number]]        Abnormal            Final result                 Please view results for these tests on the individual orders.                                              MDM  Number of Diagnoses or Management Options  Hypochloremia:   Hypokalemia:   Injury of head, initial encounter:   Laceration of right ear lobe, initial encounter:   Multiple contusions:   Syncope and collapse:   Diagnosis management comments: Chart Review: 1/15/20 Patient was seen by Dr. Ceja and had PM interrogated.   CONCLUSION:  No evidence for reversible myocardial ischemia noted.  Reverse redistribution in the inferoseptal and apical wall  LV EF 44%           Electronically Signed by Jamel Jacome MD on 04/25/2019  18:29:19  Jamel Jacome MD  Comorbidity: Past Medical History:  No date: Chronic kidney disease  No date: Sjogren's syndrome (CMS/HCC)  Imaging: Was interpreted by physician and reviewed by myself: Ct Head Without Contrast    Result Date: 2/2/2020  CT head: 1. Mild right frontal scalp soft tissue swelling. 2. No acute intracranial abnormality. 3. Mild volume loss and chronic microvascular ischemic changes. CT cervical spine: 1. No acute fracture or traumatic subluxation. 2. Moderate multilevel degenerative disc disease and facet arthropathy which is most pronounced at C3-4 with moderate to severe canal stenosis. Jevon Kauffman M.D. Neuroradiologist [email or web address] Thank you for this referral. This exam was interpreted by a fellowship trained neuroradiologist with subspeciality training in Neuroradiology. SLOT  68  "Electronically signed by:  Jevon Kauffman M.D.  2/2/2020 12:46 AM    Ct Cervical Spine Without Contrast    Result Date: 2/2/2020  CT head: 1. Mild right frontal scalp soft tissue swelling. 2. No acute intracranial abnormality. 3. Mild volume loss and chronic microvascular ischemic changes. CT cervical spine: 1. No acute fracture or traumatic subluxation. 2. Moderate multilevel degenerative disc disease and facet arthropathy which is most pronounced at C3-4 with moderate to severe canal stenosis. Jevon Kauffman M.D. Neuroradiologist [email or web address] Thank you for this referral. This exam was interpreted by a fellowship trained neuroradiologist with subspeciality training in Neuroradiology. SLOT  68 Electronically signed by:  Jevon Kauffman M.D.  2/2/2020 12:46 AM  Disposition/Treatment: Discussed results with patient, verbalized understanding.  Agreeable with plan of care.    Patient undressed and placed in gown for exam. 74-year-old male presents status post syncope x2.  He reports that his first syncopal episode was at home.  He then was in route to the hospital and when he stepped out of the car had a second syncopal episode.  Reports both episodes, patient stated, \"It all just went black.\" These were witnessed by his wife at bedside.  She reports that he has had multiple work-ups, his cardiologist is Dr. Ceja.  Reports that he is recently had his pacemaker interrogated.  He has multiple complaints: Forehead abrasion, laceration to the right earlobe, neck pain, right shoulder pain, right fourth DIP, bilateral ribs, bilateral hips and pelvis, right knee, left ankle pain. CT obtained of Head without and c-spine without. Xrays obtained of the R 4th digit, R shoulder, B hips with perlvis, R knee, L ankle, and B ribs with chest. CTs and Xrays all WNL, see above for further. Hospitalist paged for admission. Spoke with REAGAN Khan who accepted admission on behalf of Dr. Perez.     This case " was reviewed with Dr. Bearden and he evaluated the patient.        Amount and/or Complexity of Data Reviewed  Clinical lab tests: reviewed  Tests in the medicine section of CPT®: reviewed  Decide to obtain previous medical records or to obtain history from someone other than the patient: yes    Patient Progress  Patient progress: stable      Final diagnoses:   Syncope and collapse   Hypokalemia   Hypochloremia   Injury of head, initial encounter   Multiple contusions   Laceration of right ear lobe, initial encounter            Zoe Sarabia NP  02/02/20 0301       Zoe Sarabia NP  02/03/20 0415

## 2020-02-02 NOTE — PLAN OF CARE
APRN just came to see patient. Waiting for orders.  Patient fell at home with a syncope episode at home and did it again outside the hospital.  All test have come back negative.  PT consult put in.  Patient heart enzymes are elevated.  Pacemakers was interrogated in the ER and placed in chart.  Will continue to monitor.

## 2020-02-02 NOTE — H&P
[facility] Medicine Services      Patient Name: Zack Warner  : 1945  MRN: [id number]  Primary Care Physician: Briana Elizabeth MD  Date of admission: 2020    Patient Care Team:  Briana Elizabeth MD as PCP - General  Briana Elizabeth MD as PCP - Family Medicine  Briana Elizabeth MD as PCP - Claims Attributed          Subjective   History Present Illness   Denies for any nausea vomiting abdominal pain or for any other symptoms.    Chief Complaint:   Chief Complaint   Patient presents with   • Head Laceration     right ear laceration, pt blacked out getting out of car, falling to ground, right forehead abrasion       Mr. Warner is a 74 y.o.  presents to [facility] complaining of syncope with fall and collapse         74-year-old male presents to the ER with a chief complaint of syncope today with loss of consciousness and injury to the right ear when hitting something in the bathroom.  The patient states he got up to go to the bathroom and the next thing he knew he was being awakened by his wife on the bathroom floor.  Patient had a similar episode of loss of consciousness in 2019 and despite hospitalization x3 days the cause was not identified.  The patient does have a history of sick sinus syndrome with pacemaker which was interrogated at that time without identification of significant abnormality.  The patient also had neurologic work-up with CT and MRI of the brain at that time which did not reveal definitive cause of syncopal episode.  Patient states he felt well earlier in the day without any recent subjective fever or chills, increased cough, chest pain or other bothersome symptoms.      Review of Systems   Constitution: Negative for chills and fever.   Cardiovascular: Positive for syncope. Negative for chest pain and leg swelling.   Respiratory: Negative for cough and shortness of breath.    Gastrointestinal: Negative for nausea and vomiting.      Genitourinary: Negative for dysuria.   All other systems reviewed and are negative.        Personal History     Past Medical History:   Past Medical History:   Diagnosis Date   • Chronic kidney disease    • Sjogren's syndrome (CMS/HCC)        Surgical History:      Past Surgical History:   Procedure Laterality Date   • BACK SURGERY     • CATARACT EXTRACTION, BILATERAL     • HIP SURGERY  04/2012    total hip arthroplasty Rt hip   • LAPAROSCOPIC CHOLECYSTECTOMY     • NECK SURGERY     • TOTAL THYROIDECTOMY             Family History: family history includes Alzheimer's disease in his father; Arthritis in his brother; Diabetes in his daughter; Heart disease in his brother, brother, and mother; Hypertension in his daughter and mother; No Known Problems in his brother and brother. Otherwise pertinent FHx was reviewed and unremarkable.     Social History:  reports that he has quit smoking. His smoking use included cigars. He has quit using smokeless tobacco.  His smokeless tobacco use included chew. He reports that he does not drink alcohol or use drugs.      Medications:  Prior to Admission medications    Medication Sig Start Date End Date Taking? Authorizing Provider   albuterol sulfate HFA (PROAIR HFA) 108 (90 Base) MCG/ACT inhaler Inhale 2 puffs Every 4 (Four) Hours As Needed for Wheezing or Shortness of Air. 12/1/15  Yes Carmen Rubio MD   apixaban (ELIQUIS) 5 MG tablet tablet Take 5 mg by mouth Every 12 (Twelve) Hours.   Yes Carmen Rubio MD   calcitriol (ROCALTROL) 0.25 MCG capsule Take 0.25 mcg by mouth Daily. 8/10/17  Yes Carmen Rubio MD   Calcium Carbonate-Vit D-Min (CALCIUM 1200) 6278-1096 MG-UNIT chewable tablet Chew 1 tablet Daily. 1/30/12  Yes Carmen Rubio MD   diazePAM (VALIUM) 5 MG tablet Take 5 mg by mouth Every 8 (Eight) Hours As Needed for Anxiety. 4/22/14  Yes Carmen Rubio MD   docusate sodium ([employer] STOOL SOFTENER) 100 MG capsule Take 100 mg by mouth 2 (Two)  Times a Day As Needed for Constipation. 8/10/17  Yes Carmen Rubio MD   FLUoxetine (PROzac) 10 MG capsule Take 10 mg by mouth Daily. 12/27/11  Yes Carmen Rubio MD   folic acid (FOLVITE) 1 MG tablet Take 1 mg by mouth Daily. 5/17/13  Yes Provider, Historical, MD   gabapentin (NEURONTIN) 400 MG capsule Take 400 mg by mouth 2 (Two) Times a Day. 6/25/12  Yes Carmen Rubio MD   hydrALAZINE (APRESOLINE) 50 MG tablet Take 50 mg by mouth 3 (Three) Times a Day. 4/29/16  Yes Carmen Rubio MD   HYDROcodone-acetaminophen (NORCO) 7.5-325 MG per tablet Take 1 tablet by mouth Every 6 (Six) Hours As Needed. for pain 7/8/19  Yes Carmen Rubio MD   hydroxychloroquine (PLAQUENIL) 200 MG tablet Take 200 mg by mouth 2 (Two) Times a Day.   Yes Carmen Rubio MD   insulin glargine (LANTUS) 100 UNIT/ML injection Inject 80 Units under the skin into the appropriate area as directed Every Morning. 5/6/13  Yes Carmen Rubio MD   insulin lispro (HUMALOG) 100 UNIT/ML injection Inject 25 Units under the skin into the appropriate area as directed Daily Before Supper. 5/17/13  Yes Provider, Historical, MD   insulin lispro (humaLOG) 100 UNIT/ML injection Inject 30 Units under the skin into the appropriate area as directed 2 (Two) Times a Day Before Meals. Breakfast and Lunch   Yes Carmen Rubio MD   levothyroxine (SYNTHROID, LEVOTHROID) 50 MCG tablet Take 50 mcg by mouth Daily. 2/9/12  Yes Carmen Rubio MD   Multiple Vitamins-Minerals (MULTI VITAMIN/MINERALS) tablet Take 1 tablet by mouth Daily. 8/10/17  Yes Carmen Rubio MD   omeprazole (priLOSEC) 40 MG capsule Take 40 mg by mouth Daily. 11/15/19  Yes Carmen Rubio MD   pravastatin (PRAVACHOL) 40 MG tablet Take 80 mg by mouth Daily.   Yes Carmen Rubio MD   pyridostigmine (MESTINON) 60 MG tablet Take 30 mg by mouth 2 (Two) Times a Day. 6/27/19  Yes Carmen Rubio MD   tamsulosin (FLOMAX) 0.4 MG  "capsule 24 hr capsule Take 1 capsule by mouth Daily. 8/2/12  Yes Carmen Rubio MD   vitamin B-6 (PYRIDOXINE) 100 MG tablet Take 100 mg by mouth Daily. 8/10/17  Yes Carmen Rubio MD   vitamin D (ERGOCALCIFEROL) 96283 units capsule capsule Take 50,000 Units by mouth Every 30 (Thirty) Days. 7/21/19  Yes Carmen Rubio MD   azelastine (OPTIVAR) 0.05 % ophthalmic solution INSTILL 1 DROP INTO BOTH EYES TWICE A DAY 6/23/19 2/2/20  Carmen Rubio MD   baclofen (LIORESAL) 10 MG tablet BACLOFEN 10 MG TABS 2/9/12 2/2/20  Carmen Rubio MD   Blood Glucose Monitoring Suppl (KOKO CONTOUR MONITOR) w/Device kit KOKO CONTOUR MONITOR w/Device KIT 2/9/12 2/2/20  Carmen Rubio MD   bumetanide (BUMEX) 2 MG tablet BUMETANIDE 2 MG TABS 5/3/13 2/2/20  Carmen Rubio MD   Cholecalciferol 2000 units capsule VITAMIN D2 CAPS (CHOLECALCIFEROL CAPS) 1.25MG 8/10/17 2/2/20  Carmen Rubio MD   cycloSPORINE (RESTASIS) 0.05 % ophthalmic emulsion RESTASIS 0.05 % EMUL 10/1/13 2/2/20  Carmen Rubio MD   ELIQUIS 5 MG tablet tablet TAKE 1 TABLET BY MOUTH TWICE A DAY 8/23/19 2/2/20  Jamel Jacome MD   glucose blood (CONTOUR TEST) test strip CONTOUR TEST STRP 4/18/13 2/2/20  Carmen Rubio MD   HYDROcodone-acetaminophen (NORCO)  MG per tablet HYDROCODONE-ACETAMINOPHEN  MG TABS 4/3/15 2/2/20  Carmen Rubio MD   hydroxychloroquine (PLAQUENIL) 200 MG tablet TAKE 1 TABLET BY MOUTH TWICE A DAY  Patient taking differently: 200 mg. 1/14/20 2/2/20  Saba Salgado APRN   Insulin Syringe-Needle U-100 (BD INSULIN SYRINGE U/F) 31G X 5/16\" 0.3 ML misc BD INSULIN SYRINGE U/F 31G X 5/16\" 0.3 ML 4/22/14 2/2/20  Carmen Rubio, MD   lisinopril (PRINIVIL,ZESTRIL) 20 MG tablet Every 12 (Twelve) Hours. 8/29/17 2/2/20  Carmen Rubio MD   metOLazone (ZAROXOLYN) 2.5 MG tablet Take 2.5 mg by mouth Daily. 11/5/19 2/2/20  ProviderCarmen MD   "   montelukast (SINGULAIR) 10 MG tablet Take 10 mg by mouth Every Evening. 7/6/19 2/2/20  Carmen Rubio MD   potassium chloride (KLOR-CON) 10 MEQ CR tablet  11/26/12 2/2/20  Carmen Rubio MD   pravastatin (PRAVACHOL) 80 MG tablet TAKE 1 TABLET EVERY DAY 8/12/19 2/2/20  Jamel Jacome MD   topiramate (TOPAMAX) 25 MG tablet Every 12 (Twelve) Hours. 10/26/17 2/2/20  Carmen Rubio MD       Allergies:  No Known Allergies    Objective   Objective     Vital Signs  Temp:  [97.9 °F (36.6 °C)] 97.9 °F (36.6 °C)  Heart Rate:  [79-98] 95  Resp:  [16-20] 17  BP: (103-139)/(66-78) 139/78  SpO2:  [94 %-98 %] 98 %  on   ;   Device (Oxygen Therapy): room air  Body mass index is 42.07 kg/m².    Physical Exam   Constitutional: He is oriented to person, place, and time. He appears well-developed and well-nourished. No distress.   HENT:   Head: Normocephalic.   Mouth/Throat: No oropharyngeal exudate.   1 cm laceration to the lobe of the right ear which has been sutured   Eyes: Pupils are equal, round, and reactive to light. EOM are normal. No scleral icterus.   Neck: Normal range of motion. Neck supple. No JVD present. No tracheal deviation present. No thyromegaly present.   Cardiovascular: Normal rate, regular rhythm, normal heart sounds and intact distal pulses.   No murmur heard.  Pulmonary/Chest: Effort normal and breath sounds normal. No respiratory distress.   Abdominal: Soft. Bowel sounds are normal. He exhibits no distension.   Musculoskeletal: Normal range of motion. He exhibits no tenderness.   Lymphadenopathy:     He has no cervical adenopathy.   Neurological: He is alert and oriented to person, place, and time. He displays normal reflexes. No cranial nerve deficit or sensory deficit. He exhibits normal muscle tone. Coordination normal.   Skin: Skin is warm. Capillary refill takes less than 2 seconds. He is not diaphoretic.   Psychiatric: He has a normal mood and affect. His behavior is  normal. Judgment and thought content normal.   Nursing note and vitals reviewed.      Results Review:  I have personally reviewed most recent cardiac tracings, lab results and radiology images and interpretations and agree with findings, most notably: .    Results from last 7 days   Lab Units 02/02/20  0121   WBC 10*3/mm3 6.10   HEMOGLOBIN g/dL 12.3*   HEMATOCRIT % 36.8*   PLATELETS 10*3/mm3 185   INR  1.14*     Results from last 7 days   Lab Units 02/02/20  0121   SODIUM mmol/L 137   POTASSIUM mmol/L 3.0*   CHLORIDE mmol/L 89*   CO2 mmol/L 34.0*   BUN mg/dL 48*   CREATININE mg/dL 2.64*   GLUCOSE mg/dL 144*   CALCIUM mg/dL 10.0   TROPONIN T ng/mL 0.048*     Estimated Creatinine Clearance: 32.7 mL/min (A) (by C-G formula based on SCr of 2.64 mg/dL (H)).  Brief Urine Lab Results  (Last result in the past 365 days)      Color   Clarity   Blood   Leuk Est   Nitrite   Protein   CREAT   Urine HCG        02/02/20 0303 Yellow Clear Negative Negative Negative Negative               Microbiology Results (last 10 days)     ** No results found for the last 240 hours. **          ECG/EMG Results (most recent)     Procedure Component Value Units Date/Time    ECG 12 Lead [[id number]] Collected:  02/02/20 0100     Updated:  02/02/20 0103    Narrative:       HEART RATE= 86  bpm  RR Interval= 696  ms  GA Interval= 187  ms  P Horizontal Axis= 223  deg  P Front Axis= -71  deg  QRSD Interval= 184  ms  QT Interval= 453  ms  QRS Axis= -58  deg  T Wave Axis= 112  deg  - ABNORMAL ECG -  Atrial-ventricular dual-paced rhythm  Electronically Signed By:   Date and Time of Study: 2020-02-02 01:00:08                    Ct Head Without Contrast    Result Date: 2/2/2020  CT head: 1. Mild right frontal scalp soft tissue swelling. 2. No acute intracranial abnormality. 3. Mild volume loss and chronic microvascular ischemic changes. CT cervical spine: 1. No acute fracture or traumatic subluxation. 2. Moderate multilevel degenerative disc disease and  facet arthropathy which is most pronounced at C3-4 with moderate to severe canal stenosis. Jevon Kauffman M.D. Neuroradiologist [email or web address] Thank you for this referral. This exam was interpreted by a fellowship trained neuroradiologist with subspeciality training in Neuroradiology. SLOT  68 Electronically signed by:  Jevon Kauffman M.D.  2/2/2020 12:46 AM    Ct Cervical Spine Without Contrast    Result Date: 2/2/2020  CT head: 1. Mild right frontal scalp soft tissue swelling. 2. No acute intracranial abnormality. 3. Mild volume loss and chronic microvascular ischemic changes. CT cervical spine: 1. No acute fracture or traumatic subluxation. 2. Moderate multilevel degenerative disc disease and facet arthropathy which is most pronounced at C3-4 with moderate to severe canal stenosis. Jevon Kauffman M.D. Neuroradiologist [email or web address] Thank you for this referral. This exam was interpreted by a fellowship trained neuroradiologist with subspeciality training in Neuroradiology. SLOT  68 Electronically signed by:  Jevon Kauffman M.D.  2/2/2020 12:46 AM        Estimated Creatinine Clearance: 32.7 mL/min (A) (by C-G formula based on SCr of 2.64 mg/dL (H)).    Assessment/Plan   Assessment/Plan       Active Hospital Problems    Diagnosis  POA   • Syncope and collapse [R55]  Yes      Resolved Hospital Problems   No resolved problems to display.       Active Hospital Problems:  No notes have been filed under this hospital service.  Service: Hospitalist      Syncope, uncertain etiology--symptoms most consistent with idiopathic orthostatic hypotension: Check orthostatic vital signs; physical therapy consult; fall precautions    --Patient on hydralazine for blood pressure control    --Patient is on Flomax    Hypothyroidism with history of thyroid cancer with thyroidectomy and parathyroid, thymene involvement: Continue Calcitrol, mestinon; check TSH; continue Synthroid;  continue calcium    Arrhythmia with sick sinus syndrome: Continue Eliquis    Rheumatoid arthritis, chronic: Continue Plaquenil, Neurontin, folic acid, Norco    Anxiety, chronic: Continue Prozac, Valium    Hypertension, chronic: Hold Apresoline    Diabetes type 2, chronic, moderately controlled: Continue Lantus; mealtime insulin; check hemoglobin A1c: Continue Neurontin    GERD, chronic: Continue Omeprazole    HLD, chronic: Continue Pravachol    BPH, chronic: Continue Flomax    VTE Prophylaxis - Lovenox 30 mg SC daily.    CODE STATUS:    Code Status and Medical Interventions:   Ordered at: 02/02/20 0416     Code Status:    CPR     Medical Interventions (Level of Support Prior to Arrest):    Full       Admission Status:  I believe this patient meets observation criteria.      I discussed the patient's findings and my recommendations with patient and family.        Electronically signed by NUPUR Pineda, 02/02/20, 4:17 AM.  [facility] Hospitalist Team    Hospitalist / Attending note.    Patient seen and examined, chart reviewed.  Agree with above.  74-year-old male at the episode of syncope, witnessed by his wife.  He says it happened all of a sudden.  Patient discharged and small laceration to the ear is stitched in ER.  Initial work-up including CT head was negative patient known to cardiology service, has a pacemaker device in place.    Vitals reviewed    Chest bilateral entry, normal vesicular breathing  Cardiovascular S1-S2 there is no murmur  Abdomen soft nontender good sounds audible  CNS grossly intact there is no focal neurologic deficit.    Impression  Syncope likely cardiac in nature  History of sick sinus syndrome with pacemaker in place  Hypertension  Diabetes mellitus type 2    Plan  Pacemaker interrogation  Cardiology consult  Telemetry  Discussed with the patient and the wife at the bedside.    Joby perea MD.

## 2020-02-02 NOTE — ED NOTES
Patient brought in by wife due to him falling at home on a space heater and cutting his earlobe.  Upon arrival while he was getting out of car and wife was inside grabbing a wheelchair, wife reports he stood up out of the car and passed out scraping his head on the ground.  He reports that he has been getting light headed and having syncopal episodes freq but nothing has been resolved.       Zack Echeverria RN  02/02/20 0239

## 2020-02-02 NOTE — CONSULTS
Referring Provider: Dr. Perez  Reason for Consultation: Syncope      Cardiology assessment and plan    Syncope  Recurrent syncope  Sick sinus syndrome status post pacemaker placement  Hypertension  Hyperlipidemia  Diabetes mellitus type 2  Benign prostatic hypertrophy  Paroxysmal atrial fibrillation currently on anticoagulation therapy  Obstructive sleep apnea  Complaining of temporal headaches    No evidence of any acute coronary syndrome  Agree with orthostatic vitals  Device interrogation to rule out any cardiac arrhythmia  Check ESR and C-reactive protein to rule out temporal arteritis  Avoid vasodilators  Further recommendations based on patient hospital course        patient underwent cardiac catheterization August 2016, showed 55% mid LAD disease, 50% lateral branch disease, less than 50% RCA disease       CONCLUSION:/Stress test in April 2019    No evidence for reversible myocardial ischemia noted.  Reverse redistribution in the inferoseptal and apical wall  LV EF 44%    Interpretation/echocardiogram in April 2019    Technically difficult study  Limited views were obtained  Probably low normal LV systolic function EF 50%  Echo contrast was used  RV enlargement noted          Chief complaint syncope with loss of consciousness        Subjective .     History of present illness:  Zack Warner is a 74 y.o. male who presents with with a chief complaint of syncope today with loss of consciousness and injury to the right ear when hitting something in the bathroom.  The patient states he got up to go to the bathroom and the next thing he knew he was being awakened by his wife on the bathroom floor.  Patient had a similar episode of loss of consciousness in October 2019 and despite hospitalization x3 days the cause was not identified.  The patient does have a history of sick sinus syndrome with pacemaker which was interrogated at that time without identification of significant abnormality.  The patient also  had neurologic work-up with CT and MRI of the brain at that time which did not reveal definitive cause of syncopal episode.  Patient states he felt well earlier in the day without any recent subjective fever or chills, increased cough, chest pain or other bothersome symptoms.    Denies any chest pain  Denies any exertional symptoms of chest discomfort  Shortness of breath at baseline  No other new complaints    Review of Systems  Review of Systems   Constitution: Negative for chills, decreased appetite and malaise/fatigue.   HENT: Negative for congestion.    Eyes: Negative for blurred vision and double vision.   Cardiovascular: Positive for dyspnea on exertion. Negative for chest pain, irregular heartbeat, leg swelling, near-syncope, orthopnea, palpitations, paroxysmal nocturnal dyspnea and syncope.   Respiratory: Negative for cough and shortness of breath.    Hematologic/Lymphatic: Negative for adenopathy. Does not bruise/bleed easily.   Skin: Negative for rash.   Musculoskeletal: Negative for back pain and joint pain.   Gastrointestinal: Negative for bloating, abdominal pain, heartburn, hematemesis and hematochezia.   Genitourinary: Negative for flank pain and hematuria.   Neurological: Negative for dizziness and focal weakness.   Psychiatric/Behavioral: Negative for altered mental status. The patient is not nervous/anxious.        Past Medical History  Past Medical History:   Diagnosis Date   • Chronic kidney disease    • Diabetes mellitus (CMS/HCC)    • Sjogren's syndrome (CMS/HCC)     and Past Surgical History:   Procedure Laterality Date   • BACK SURGERY     • CATARACT EXTRACTION, BILATERAL     • HIP SURGERY  04/2012    total hip arthroplasty Rt hip   • LAPAROSCOPIC CHOLECYSTECTOMY     • NECK SURGERY     • TOTAL THYROIDECTOMY         Family History  Family History   Problem Relation Age of Onset   • Hypertension Mother    • Heart disease Mother    • Alzheimer's disease Father    • Heart disease Brother    •  "Hypertension Daughter    • Diabetes Daughter    • Heart disease Brother    • Arthritis Brother    • No Known Problems Brother    • No Known Problems Brother        Social History  Social History     Socioeconomic History   • Marital status:      Spouse name: Not on file   • Number of children: Not on file   • Years of education: Not on file   • Highest education level: Not on file   Tobacco Use   • Smoking status: Former Smoker     Types: Cigars   • Smokeless tobacco: Former User     Types: Chew   Substance and Sexual Activity   • Alcohol use: No     Frequency: Never   • Drug use: No   • Sexual activity: Defer       Objective     Physical Exam:  Physical Exam   Constitutional: He is oriented to person, place, and time. He appears well-developed and well-nourished.   HENT:   Head: Normocephalic and atraumatic.   Eyes: Pupils are equal, round, and reactive to light. Conjunctivae are normal.   Neck: Normal range of motion. Neck supple. No thyromegaly present.   Cardiovascular: Normal rate, regular rhythm, S1 normal, S2 normal and intact distal pulses. PMI is displaced.   Murmur heard.   Early systolic murmur is present with a grade of 2/6.  Pulmonary/Chest: Effort normal and breath sounds normal.   Abdominal: Soft. Bowel sounds are normal.   Musculoskeletal: He exhibits no edema.   Neurological: He is alert and oriented to person, place, and time.   Skin: Skin is warm.   Nursing note and vitals reviewed.      Vital Signs  Vitals:    02/02/20 0501 02/02/20 0555 02/02/20 0556 02/02/20 0557   BP: 142/88 133/81 114/75 104/70   BP Location: Left arm Right arm Right arm Right arm   Patient Position: Lying Lying Sitting Standing   Pulse: 90 71 71 71   Resp: 18      Temp: 97.7 °F (36.5 °C)      TempSrc: Oral      SpO2: 99%      Weight: 126 kg (277 lb 6.4 oz)      Height: 172.7 cm (68\")          Weight  Flowsheet Rows      First Filed Value   Admission Height  172.7 cm (68\") Documented at 02/02/2020 0033   Admission " Weight  126 kg (276 lb 10.8 oz) Documented at 02/02/2020 0033              Results Review:  Lab Results (last 24 hours)     Procedure Component Value Units Date/Time    POC Glucose Once [[id number]]  (Abnormal) Collected:  02/02/20 1115    Specimen:  Blood Updated:  02/02/20 1117     Glucose 267 mg/dL      Comment: Serial Number: [id number]:  [id number]       Troponin [[id number]]  (Normal) Collected:  02/02/20 0859    Specimen:  Blood Updated:  02/02/20 0944     Troponin T 0.030 ng/mL     Narrative:       Troponin T Reference Range:  <= 0.03 ng/mL-   Negative for AMI  >0.03 ng/mL-     Abnormal for myocardial necrosis.  Clinicians would have to utilize clinical acumen, EKG, Troponin and serial changes to determine if it is an Acute Myocardial Infarction or myocardial injury due to an underlying chronic condition.       Results may be falsely decreased if patient taking Biotin.      POC Glucose Once [[id number]]  (Abnormal) Collected:  02/02/20 0726    Specimen:  Blood Updated:  02/02/20 0727     Glucose 254 mg/dL      Comment: Serial Number: [id number]:  [id number]       Troponin [[id number]]  (Normal) Collected:  02/02/20 0440    Specimen:  Blood Updated:  02/02/20 0531     Troponin T 0.030 ng/mL     Narrative:       Troponin T Reference Range:  <= 0.03 ng/mL-   Negative for AMI  >0.03 ng/mL-     Abnormal for myocardial necrosis.  Clinicians would have to utilize clinical acumen, EKG, Troponin and serial changes to determine if it is an Acute Myocardial Infarction or myocardial injury due to an underlying chronic condition.       Results may be falsely decreased if patient taking Biotin.      Basic Metabolic Panel [[id number]]  (Abnormal) Collected:  02/02/20 0440    Specimen:  Blood Updated:  02/02/20 0530     Glucose 258 mg/dL      BUN 47 mg/dL      Creatinine 2.42 mg/dL      Sodium 132 mmol/L      Potassium 3.2 mmol/L      Chloride 88 mmol/L      CO2 31.0 mmol/L      Calcium 9.5 mg/dL      eGFR Non   Amer 26 mL/min/1.73      BUN/Creatinine Ratio 19.4     Anion Gap 13.0 mmol/L     Narrative:       GFR Normal >60  Chronic Kidney Disease <60  Kidney Failure <15      TSH [[id number]]  (Normal) Collected:  02/02/20 0440    Specimen:  Blood Updated:  02/02/20 0524     TSH 1.840 uIU/mL     CBC Auto Differential [[id number]]  (Abnormal) Collected:  02/02/20 0440    Specimen:  Blood Updated:  02/02/20 0458     WBC 7.70 10*3/mm3      RBC 3.88 10*6/mm3      Hemoglobin 11.6 g/dL      Hematocrit 34.7 %      MCV 89.3 fL      MCH 29.9 pg      MCHC 33.5 g/dL      RDW 14.9 %      RDW-SD 46.8 fl      MPV 7.3 fL      Platelets 156 10*3/mm3      Neutrophil % 73.8 %      Lymphocyte % 17.6 %      Monocyte % 6.0 %      Eosinophil % 2.0 %      Basophil % 0.6 %      Neutrophils, Absolute 5.70 10*3/mm3      Lymphocytes, Absolute 1.40 10*3/mm3      Monocytes, Absolute 0.50 10*3/mm3      Eosinophils, Absolute 0.20 10*3/mm3      Basophils, Absolute 0.00 10*3/mm3      nRBC 0.1 /100 WBC     Urinalysis With Culture If Indicated - Urine, Clean Catch [[id number]]  (Abnormal) Collected:  02/02/20 0303    Specimen:  Urine, Clean Catch Updated:  02/02/20 0313     Color, UA Yellow     Appearance, UA Clear     pH, UA 6.0     Specific Gravity, UA 1.009     Glucose,  mg/dL (Trace)     Ketones, UA Negative     Bilirubin, UA Negative     Blood, UA Negative     Protein, UA Negative     Leuk Esterase, UA Negative     Nitrite, UA Negative     Urobilinogen, UA 0.2 E.U./dL    Narrative:       Urine microscopic not indicated.    Troponin [[id number]]  (Abnormal) Collected:  02/02/20 0121    Specimen:  Blood Updated:  02/02/20 0149     Troponin T 0.048 ng/mL     Narrative:       Troponin T Reference Range:  <= 0.03 ng/mL-   Negative for AMI  >0.03 ng/mL-     Abnormal for myocardial necrosis.  Clinicians would have to utilize clinical acumen, EKG, Troponin and serial changes to determine if it is an Acute Myocardial Infarction or myocardial injury due  to an underlying chronic condition.       Results may be falsely decreased if patient taking Biotin.      Basic Metabolic Panel [[id number]]  (Abnormal) Collected:  02/02/20 0121    Specimen:  Blood Updated:  02/02/20 0148     Glucose 144 mg/dL      BUN 48 mg/dL      Creatinine 2.64 mg/dL      Sodium 137 mmol/L      Potassium 3.0 mmol/L      Chloride 89 mmol/L      CO2 34.0 mmol/L      Calcium 10.0 mg/dL      eGFR Non African Amer 24 mL/min/1.73      BUN/Creatinine Ratio 18.2     Anion Gap 14.0 mmol/L     Narrative:       GFR Normal >60  Chronic Kidney Disease <60  Kidney Failure <15      Protime-INR [[id number]]  (Abnormal) Collected:  02/02/20 0121    Specimen:  Blood Updated:  02/02/20 0137     Protime 11.7 Seconds      INR 1.14    aPTT [[id number]]  (Normal) Collected:  02/02/20 0121    Specimen:  Blood Updated:  02/02/20 0137     PTT 28.6 seconds     CBC & Differential [[id number]] Collected:  02/02/20 0121    Specimen:  Blood Updated:  02/02/20 0126    Narrative:       The following orders were created for panel order CBC & Differential.  Procedure                               Abnormality         Status                     ---------                               -----------         ------                     CBC Auto Differential[[id number]]        Abnormal            Final result                 Please view results for these tests on the individual orders.    CBC Auto Differential [[id number]]  (Abnormal) Collected:  02/02/20 0121    Specimen:  Blood Updated:  02/02/20 0126     WBC 6.10 10*3/mm3      RBC 4.19 10*6/mm3      Hemoglobin 12.3 g/dL      Hematocrit 36.8 %      MCV 87.7 fL      MCH 29.3 pg      MCHC 33.4 g/dL      RDW 14.9 %      RDW-SD 45.9 fl      MPV 7.0 fL      Platelets 185 10*3/mm3      Neutrophil % 66.5 %      Lymphocyte % 23.2 %      Monocyte % 6.2 %      Eosinophil % 3.3 %      Basophil % 0.8 %      Neutrophils, Absolute 4.00 10*3/mm3      Lymphocytes, Absolute 1.40 10*3/mm3      Monocytes,  Absolute 0.40 10*3/mm3      Eosinophils, Absolute 0.20 10*3/mm3      Basophils, Absolute 0.10 10*3/mm3      nRBC 0.1 /100 WBC     POC Glucose Once [[id number]]  (Abnormal) Collected:  02/02/20 0043    Specimen:  Blood Updated:  02/02/20 0046     Glucose 158 mg/dL      Comment: Serial Number: [id number]:  [id number]           Imaging Results (Last 72 Hours)     Procedure Component Value Units Date/Time    XR Shoulder 2+ View Right [[id number]] Collected:  02/02/20 0932     Updated:  02/02/20 0936    Narrative:       DATE OF EXAM:  2/2/2020 1:25 AM     PROCEDURE:  XR SHOULDER 2+ VW RIGHT-     INDICATIONS:  Fall, right shoulder pain     COMPARISON:  No Comparisons Available     TECHNIQUE:   2 or more views of the right shoulder were obtained.     FINDINGS:  No definite acute fracture or malalignment is seen. There is osteophyte  formation with mild to moderate glenohumeral joint space narrowing.  There appear to be moderate to severe degenerative changes at the  acromioclavicular joint with undersurface osteophytes. There is mild  enthesopathy at the greater tuberosity. Mineralization appears grossly  normal. Soft tissues appear unremarkable.       Impression:       1.No radiographic findings of acute osseous shoulder abnormality.  2.Moderate to severe, clavicular joint degeneration and mild to moderate  glenohumeral osteoarthritis.  3.Mild enthesopathy at the greater tuberosity, which could be associated  with rotator cuff pathology.     Electronically Signed By-DR. Oliver Patterson MD On:2/2/2020 9:34 AM  This report was finalized on [id number] by DR. Oliver Patterson MD.    XR Ribs Bilateral 4+ View With PA Chest [[id number]] Collected:  02/02/20 0929     Updated:  02/02/20 0934    Narrative:       DATE OF EXAM:  2/2/2020 3:00 AM     PROCEDURE:  XR RIBS BILATERAL 4+ VW W PA CHEST-     INDICATIONS:  Fall, bilateral rib pain     COMPARISON:  03/06/2018     TECHNIQUE:   A minimum of four radiologic views of the  bilateral ribs with a PA chest  were obtained.        FINDINGS:  No focal or diffuse pulmonary infiltrate is seen. Heart size appears  mildly prominent, as before. Mediastinal contour appears grossly  unchanged with mild tortuosity of the thoracic aorta. There is a left  chest wall pacemaker with electrodes projecting in similar positions. No  pneumothorax or effusion is identified. Postoperative changes are seen  in the lower cervical spine. There are degenerative changes in the  thoracic spine. No definite acute displaced rib fracture is identified.  There are moderate degenerative changes at the bilateral glenohumeral  and acromioclavicular joints.        Impression:       1.No radiographic evidence of an acute displaced rib fracture.  A  nondisplaced fracture could be radiographically occult.  2.No radiographic findings of acute cardiopulmonary abnormality.     Electronically Signed By-DR. Oliver Patterson MD On:2/2/2020 9:32 AM  This report was finalized on [id number] by DR. Oliver Patterson MD.    XR Hips Bilateral With or Without Pelvis 2 View [[id number]] Collected:  02/02/20 0925     Updated:  02/02/20 0934    Narrative:       DATE OF EXAM:  2/2/2020 3:00 AM     PROCEDURE:  XR HIPS BILATERAL W OR WO PELVIS 2 VIEW-     INDICATIONS:  Fall, pain in both hips. History of right hip arthroplasty.     COMPARISON:  04/22/2019     TECHNIQUE:   AP pelvis, AP hips, and frog-leg lateral hip views were obtained.     FINDINGS:  Status post right hip arthroplasty. There is heterotopic ossification at  the right hip. Hardware components appear in similar positions. There  appears to be chronic periosteal reaction at the lateral aspect of the  proximal right femur, unchanged compared to the prior exam. No definite  new hardware complication is identified.     There is osteophyte formation at the left hip with mild joint space  narrowing, as before. There is enthesophyte formation at the bilateral  trochanters and iliac wings  initial tuberosities. No definite new  displaced fracture is seen. There are degenerative changes at the  sacroiliac joints and pubic symphysis. Soft tissues appear unremarkable.          Impression:       1.No definite radiographic evidence of acute osseous hip or pelvic  injury at this time.  2.Status post right hip arthroplasty with heterotopic ossification and  suspected chronic periosteal reaction along the proximal right femur, as  before.  3.Mild left hip osteoarthritis. Degenerative changes are also noted at  the sacroiliac joints and pubic symphysis.     Electronically Signed By-DR. Oliver Patterson MD On:2/2/2020 9:29 AM  This report was finalized on [id number] by DR. Oliver Patterson MD.    XR Ankle 3+ View Left [[id number]] Collected:  02/02/20 0907     Updated:  02/02/20 0911    Narrative:       DATE OF EXAM:  2/2/2020 1:25 AM     PROCEDURE:  XR ANKLE 3+ VW LEFT-     INDICATIONS:  Fall, left ankle pain.     COMPARISON:  No Comparisons Available     TECHNIQUE:   A minimum of three routine standard radiographic views were obtained of  the left ankle.     FINDINGS:  There appears to be diffuse soft tissue prominence of the visualized  lower leg and ankle.  There is mild osteophyte formation and joint space  narrowing at the tibiotalar joint. There is also suspected mild  degenerative changes at the subtalar and midfoot joints. Ankle mortise  alignment appears within normal limits. Osseous structures appear mildly  demineralized. There is mild enthesophyte formation at the Achilles  insertion. No definite displaced fracture is identified.       Impression:       1.No radiographic findings of acute osseous left ankle injury.  2.Nonspecific diffuse soft tissue edema.  3.Mild osteoarthritis of the tibiotalar, subtalar, and midfoot joints.  4.Osseous demineralization.     Electronically Signed By-DR. Oliver Patterson MD On:2/2/2020 9:09 AM  This report was finalized on [id number] by DR. Oliver Patterson MD.    XR  Hand 3+ View Right [[id number]] Collected:  02/02/20 0906     Updated:  02/02/20 0909    Narrative:       DATE OF EXAM:  2/2/2020 1:25 AM     PROCEDURE:  XR HAND 3+ VW RIGHT-     INDICATIONS:  Fall, pain in the right third digit     COMPARISON:  07/22/2019     TECHNIQUE:   A minimum of three routine standard radiographic views were obtained of  the right hand.     FINDINGS:  Osseous structures are demineralized.  There is suspected mild to  moderate osteoarthritis of the interphalangeal joints and wrist. Osseous  alignment appears within normal limits. No displaced fracture is seen.  Soft tissues appear grossly unremarkable.        Impression:       1.No radiographic findings of acute osseous hand injury.  2.Mild to moderate osteoarthritis and osseous demineralization.     Electronically Signed By-DR. Oliver Patterson MD On:2/2/2020 9:07 AM  This report was finalized on [id number] by DR. Oliver Patterson MD.    XR Knee 1 or 2 View Right [[id number]] Collected:  02/02/20 0844     Updated:  02/02/20 0848    Narrative:       DATE OF EXAM:  2/2/2020 3:00 AM     PROCEDURE:  XR KNEE 1 OR 2 VW RIGHT-     INDICATIONS:  Fall, right anterior knee pain     COMPARISON:  No Comparisons Available     TECHNIQUE:   One to two radiologic views of the right knee were obtained.        FINDINGS:  There is osteophytosis with mild tricompartmental joint space narrowing.  Mineralization appears within normal limits. There is enthesophyte  formation at the superior pole of the patella. No displaced fracture or  acute malalignment is identified. No definite knee effusion. There is  calcific atherosclerosis of the visualized lower extremity arteries. No  definite acute soft tissue abnormality is seen.        Impression:       1.No radiographic findings of acute osseous abnormality.  2.Mild tricompartmental osteoarthritis.     Electronically Signed By-DR. Oliver Patterson MD On:2/2/2020 8:45 AM  This report was finalized on [id number] by   Oliver Patterson MD.    CT Head Without Contrast [[id number]] Collected:  02/02/20 0036     Updated:  02/02/20 0247    Narrative:       EXAMINATION: CT HEAD AND CERVICAL SPINE WITHOUT CONTRAST    DATE: 2/2/2020 2:04 AM    INDICATION: Syncope, head trauma    COMPARISON:CT head and cervical spine 4/22/2019    TECHNIQUE: Noncontrast imaging obtained from the vertex to the skull base. Axial noncontrast imaging through the cervical spine followed by coronal and sagittal reformats.  CT dose lowering techniques were used, to include: automated exposure control,   adjustment for patient size, and or use of iterative reconstruction.?    FINDINGS:    CT head:    Soft Tissues: Mild right frontal scalp soft tissue swelling    Skull: No underlying skull fracture or radiopaque foreign body.    Sinuses: Paranasal sinuses are clear.    Mastoids: Mastoid air cells are clear.     Globes and Orbits: Globes and orbits are intact.    Brain: No acute hemorrhage.  No midline shift, masses, or mass effect.  No evidence of acute infarct by noncontrast CT.    Ventricles and Cisterns: Ventricular size and configuration is within normal limits. Basal cisterns are patent. No abnormal extra-axial fluid collection.    Senescent Changes: Mild to moderate volume loss. Mild white matter hypoattenuation favoring chronic microvascular ischemic changes.      CT cervical spine:    Osseous:    Cervical lordosis is maintained.    Vertebral body height and alignment is preserved.    No acute fracture or subluxation.    No prevertebral soft tissue swelling. The lateral masses of C1 align on C2.      Degenerative Changes    Multilevel anterior bridging osteophytosis throughout the cervical spine.    C2-3:Mild facet arthropathy with mild bilateral foraminal narrowing.    C3-4:Mild to moderate facet arthropathy and uncovertebral hypertrophy. Severe bilateral foraminal stenosis. Disc osteophyte complex effaces the ventral subarachnoid space and flattens the ventral  thecal sac. AP thecal sac diameter is approximately 4 mm.    C4-5:Mild to moderate facet arthropathy and uncovertebral hypertrophy. Moderate left and mild-to-moderate right foraminal narrowing. Disc osteophyte complex effaces the ventral subarachnoid space and contours the ventral thecal sac. AP thecal sac   diameter is approximately 8 mm.    C5-6:Mild facet arthropathy and uncovertebral hypertrophy. Moderate to severe bilateral foraminal stenosis. Disc osteophyte complex narrows the ventral subarachnoid space.    C6-7:Mild facet arthropathy and uncovertebral hypertrophy. Severe right and moderate left foraminal stenosis.    C7-T1:Mild facet arthropathy and uncovertebral hypertrophy. Mild foraminal narrowing. Disc osteophyte complex effaces the ventral subarachnoid space and flattens the ventral thecal sac. AP thecal sac diameter is approximately 6 mm.    Left facet-spinous process screw fixation from C4 through C7.      Soft Tissues of the Neck:      No focal soft tissue abnormality within the visualized neck.    Visualized lung apices are clear. Visualized airways are patent.        Impression:         CT head:      1. Mild right frontal scalp soft tissue swelling.    2. No acute intracranial abnormality.    3. Mild volume loss and chronic microvascular ischemic changes.        CT cervical spine:      1. No acute fracture or traumatic subluxation.    2. Moderate multilevel degenerative disc disease and facet arthropathy which is most pronounced at C3-4 with moderate to severe canal stenosis.            Jevon Kauffman M.D.   Neuroradiologist     [facility]  [email or web address]    Thank you for this referral. This exam was interpreted by a fellowship trained neuroradiologist with subspeciality training in Neuroradiology.      SLOT  68     Electronically signed by:  Jevon Kauffman M.D.    2/2/2020 12:46 AM    CT Cervical Spine Without Contrast [[id number]] Collected:  02/02/20 0036     Updated:  02/02/20  0247    Narrative:       EXAMINATION: CT HEAD AND CERVICAL SPINE WITHOUT CONTRAST    DATE: 2/2/2020 2:04 AM    INDICATION: Syncope, head trauma    COMPARISON:CT head and cervical spine 4/22/2019    TECHNIQUE: Noncontrast imaging obtained from the vertex to the skull base. Axial noncontrast imaging through the cervical spine followed by coronal and sagittal reformats.  CT dose lowering techniques were used, to include: automated exposure control,   adjustment for patient size, and or use of iterative reconstruction.?    FINDINGS:    CT head:    Soft Tissues: Mild right frontal scalp soft tissue swelling    Skull: No underlying skull fracture or radiopaque foreign body.    Sinuses: Paranasal sinuses are clear.    Mastoids: Mastoid air cells are clear.     Globes and Orbits: Globes and orbits are intact.    Brain: No acute hemorrhage.  No midline shift, masses, or mass effect.  No evidence of acute infarct by noncontrast CT.    Ventricles and Cisterns: Ventricular size and configuration is within normal limits. Basal cisterns are patent. No abnormal extra-axial fluid collection.    Senescent Changes: Mild to moderate volume loss. Mild white matter hypoattenuation favoring chronic microvascular ischemic changes.      CT cervical spine:    Osseous:    Cervical lordosis is maintained.    Vertebral body height and alignment is preserved.    No acute fracture or subluxation.    No prevertebral soft tissue swelling. The lateral masses of C1 align on C2.      Degenerative Changes    Multilevel anterior bridging osteophytosis throughout the cervical spine.    C2-3:Mild facet arthropathy with mild bilateral foraminal narrowing.    C3-4:Mild to moderate facet arthropathy and uncovertebral hypertrophy. Severe bilateral foraminal stenosis. Disc osteophyte complex effaces the ventral subarachnoid space and flattens the ventral thecal sac. AP thecal sac diameter is approximately 4 mm.    C4-5:Mild to moderate facet arthropathy and  uncovertebral hypertrophy. Moderate left and mild-to-moderate right foraminal narrowing. Disc osteophyte complex effaces the ventral subarachnoid space and contours the ventral thecal sac. AP thecal sac   diameter is approximately 8 mm.    C5-6:Mild facet arthropathy and uncovertebral hypertrophy. Moderate to severe bilateral foraminal stenosis. Disc osteophyte complex narrows the ventral subarachnoid space.    C6-7:Mild facet arthropathy and uncovertebral hypertrophy. Severe right and moderate left foraminal stenosis.    C7-T1:Mild facet arthropathy and uncovertebral hypertrophy. Mild foraminal narrowing. Disc osteophyte complex effaces the ventral subarachnoid space and flattens the ventral thecal sac. AP thecal sac diameter is approximately 6 mm.    Left facet-spinous process screw fixation from C4 through C7.      Soft Tissues of the Neck:      No focal soft tissue abnormality within the visualized neck.    Visualized lung apices are clear. Visualized airways are patent.        Impression:         CT head:      1. Mild right frontal scalp soft tissue swelling.    2. No acute intracranial abnormality.    3. Mild volume loss and chronic microvascular ischemic changes.        CT cervical spine:      1. No acute fracture or traumatic subluxation.    2. Moderate multilevel degenerative disc disease and facet arthropathy which is most pronounced at C3-4 with moderate to severe canal stenosis.            Jevon Kauffman M.D.   Neuroradiologist     [facility]  [email or web address]    Thank you for this referral. This exam was interpreted by a fellowship trained neuroradiologist with subspeciality training in Neuroradiology.      SLOT  68     Electronically signed by:  Jevon Kauffman M.D.    2/2/2020 12:46 AM               Medication Review  Scheduled Meds:  sodium chloride 10 mL Intravenous Q12H     Continuous Infusions:   PRN Meds:.•  acetaminophen **OR** acetaminophen **OR** acetaminophen  •  ondansetron  **OR** ondansetron  •  [COMPLETED] Insert peripheral IV **AND** sodium chloride  •  sodium chloride    Assessment/Plan       Syncope and collapse      Patient Active Problem List   Diagnosis   • Abnormal cardiovascular stress test   • Acute bronchitis   • Anemia   • Atrial fibrillation (CMS/HCC)   • Carpal tunnel syndrome, bilateral   • Chest pain   • Chronic gout   • Renal insufficiency   • Coronary artery disease   • Cough   • Degenerative joint disease   • Inflammatory arthritis   • Sjogren's syndrome (CMS/HCC)   • Dermatitis   • Diabetic peripheral neuropathy (CMS/HCC)   • Edema   • Edema   • Encounter for immunization   • Encounter for other specified surgical aftercare   • Fever, unspecified   • Headache   • Neck pain   • Hyperlipidemia   • Hypertension   • Ingrown toenail   • Numbness of extremity   • Body mass index 45.0-49.9, adult (CMS/HCC)   • Obesity   • Osteoarthritis of hand   • Osteoarthritis of hip   • Osteoporosis   • Presence of cardiac pacemaker   • Shortness of breath   • Sick sinus syndrome (CMS/HCC)   • Status post hip replacement   • Swelling of lower extremity   • Type 2 diabetes mellitus (CMS/HCC)   • Attacks of weakness   • Bleeding internal hemorrhoids   • Chronic obstructive lung disease (CMS/HCC)   • Gastroesophageal reflux disease with ulceration   • Irregular bowel habits   • Irritable bowel syndrome   • Rectal hemorrhage   • Anemia due to blood loss   • Coronary arteriosclerosis   • Diabetes mellitus (CMS/HCC)   • Chronic systolic heart failure (CMS/HCC)   • Congestive heart failure (CMS/HCC)   • Disorder associated with type 2 diabetes mellitus (CMS/HCC)   • Hyperparathyroidism (CMS/HCC)   • Chronic renal insufficiency, stage III (moderate) (CMS/HCC)   • Syncope and collapse           Patricio Chong MD  02/02/20  2:23 PM

## 2020-02-02 NOTE — PLAN OF CARE
Patient has complained of pain.  MD was contacted for orders to be put in.  Family was upset that patient still had not received any medications or insulin.  MD was contacted again and orders were entered to help patient with chronic back pain and diabetes. Heart was consulted.  No change in patient at this time and no issues noted at this time.

## 2020-02-03 NOTE — PLAN OF CARE
Problem: Patient Care Overview  Goal: Plan of Care Review  Outcome: Outcome(s) achieved  Flowsheets (Taken 2/3/2020 0357)  Progress: no change  Plan of Care Reviewed With: patient  Outcome Summary: Pt resting comfortably in bed. Breathing even and unlabored. Pt requested pain medication x2 during shift and states relief. Pt does state he has a constant headache but pain is managable. Will continue to monitor.

## 2020-02-03 NOTE — PLAN OF CARE
Problem: Patient Care Overview  Goal: Plan of Care Review  Outcome: Ongoing (interventions implemented as appropriate)  Flowsheets (Taken 2/3/2020 1124)  Consent Given to Review Plan with: Pt s/p 2 syncopal episodes (one at home & one coming into ED w/ spouse) w/ falls presents close to baseline w/ functional mobility and amb. Pt performed all tfs w/ CGA/ supervision. Pt amb 100 ft w/ RW w/ chronic forward flexion of trunk from previous spine surgery. Orthostatics taken & vitals stable between positions. Pt asymptomatic during PT eval. Will keep on caseload for strengthening & endurance as pt used RW during eval & typically does not at home.  Anticipate not PT needs upon DC. May benefit from RW for home use. Will follow 3x/wk.

## 2020-02-03 NOTE — NURSING NOTE
Pt noted with elevated troponin of 0.036. MD Chong informed with no new orders. Will continue to monitor.

## 2020-02-03 NOTE — PROGRESS NOTES
Reason for follow-up: Syncope  History of CAD  Paroxysmal atrial fibrillation status post permanent pacemaker placement     Patient Care Team:  Briana Elizabeth MD as PCP - General  Briana Elizabeth MD as PCP - Family Medicine  Briana Elizabeth MD as PCP - Claims Attributed    Subjective .   Feels okay     Review of Systems   Constitution: Positive for malaise/fatigue. Negative for fever.   HENT: Negative for congestion and hearing loss.    Eyes: Negative for double vision and visual disturbance.   Cardiovascular: Negative for chest pain, claudication, dyspnea on exertion, leg swelling and syncope.   Respiratory: Negative for cough and shortness of breath.    Endocrine: Negative for cold intolerance.   Skin: Negative for color change and rash.   Musculoskeletal: Negative for arthritis and joint pain.   Gastrointestinal: Negative for abdominal pain and heartburn.   Genitourinary: Negative for hematuria.   Neurological: Negative for excessive daytime sleepiness and dizziness.   Psychiatric/Behavioral: Negative for depression. The patient is not nervous/anxious.    All other systems reviewed and are negative.      Patient has no known allergies.    Scheduled Meds:  apixaban 5 mg Oral Q12H   FLUoxetine 10 mg Oral Daily   folic acid 1 mg Oral Daily   gabapentin 400 mg Oral BID   insulin glargine 80 Units Subcutaneous QAM   insulin lispro 0-24 Units Subcutaneous 4x Daily With Meals & Nightly   insulin lispro 25 Units Subcutaneous Daily Before Supper   insulin lispro 30 Units Subcutaneous Daily With Breakfast & Lunch   levothyroxine 50 mcg Oral Q AM   pantoprazole 40 mg Oral QAM   sodium chloride 10 mL Intravenous Q12H   tamsulosin 0.4 mg Oral Nightly     Continuous Infusions:   PRN Meds:.•  acetaminophen **OR** acetaminophen **OR** acetaminophen  •  albuterol  •  dextrose  •  dextrose  •  diazePAM  •  glucagon (human recombinant)  •  HYDROcodone-acetaminophen  •  insulin lispro **AND** insulin lispro  •   "ondansetron **OR** ondansetron  •  [COMPLETED] Insert peripheral IV **AND** sodium chloride  •  sodium chloride    Objective   Looks comfortable sitting in the chair    VITAL SIGNS  Vitals:    02/02/20 2020 02/03/20 0325 02/03/20 0906 02/03/20 1200   BP: 112/74 107/74 95/59 105/65   BP Location: Right arm Right arm  Right arm   Patient Position: Lying Lying  Sitting   Pulse: 114 108 80 90   Resp: 16 16  17   Temp: 98.8 °F (37.1 °C) 98.1 °F (36.7 °C)  98.7 °F (37.1 °C)   TempSrc: Oral Oral  Oral   SpO2: 96% 98%  95%   Weight:       Height:           Flowsheet Rows      First Filed Value   Admission Height  172.7 cm (68\") Documented at 02/02/2020 0033   Admission Weight  126 kg (276 lb 10.8 oz) Documented at 02/02/2020 0033           TELEMETRY: V paced rhythm at heart rate 120    Physical Exam:  Physical Exam   Constitutional: He is oriented to person, place, and time. He appears well-developed and well-nourished. He is cooperative.   HENT:   Head: Normocephalic and atraumatic.   Mouth/Throat: Uvula is midline and oropharynx is clear and moist. No oral lesions.   Eyes: Conjunctivae are normal. No scleral icterus.   Neck: Trachea normal. Neck supple. No JVD present. Carotid bruit is not present. No thyromegaly present.   Cardiovascular: Normal rate, regular rhythm, S1 normal, S2 normal, normal heart sounds, intact distal pulses and normal pulses. PMI is not displaced. Exam reveals no gallop and no friction rub.   No murmur heard.  Pulmonary/Chest: Effort normal and breath sounds normal.   Abdominal: Soft. Bowel sounds are normal.   Musculoskeletal: Normal range of motion. He exhibits deformity.   Arthritic changes noted   Neurological: He is alert and oriented to person, place, and time. He has normal strength.   No focal deficits   Skin: Skin is warm. No cyanosis.   Chronic stasis dermatitis  Bruising noted   Psychiatric: He has a normal mood and affect.                LAB RESULTS (LAST 7 DAYS)    CBC  Results from " last 7 days   Lab Units 02/02/20  0440 02/02/20  0121   WBC 10*3/mm3 7.70 6.10   RBC 10*6/mm3 3.88* 4.19   HEMOGLOBIN g/dL 11.6* 12.3*   HEMATOCRIT % 34.7* 36.8*   MCV fL 89.3 87.7   PLATELETS 10*3/mm3 156 185       BMP  Results from last 7 days   Lab Units 02/03/20  1648 02/02/20  0440 02/02/20  0121   SODIUM mmol/L 134* 132* 137   POTASSIUM mmol/L 3.3* 3.2* 3.0*   CHLORIDE mmol/L 87* 88* 89*   CO2 mmol/L 32.0* 31.0* 34.0*   BUN mg/dL 48* 47* 48*   CREATININE mg/dL 2.16* 2.42* 2.64*   GLUCOSE mg/dL 177* 258* 144*       CMP Results from last 7 days   Lab Units 02/03/20  1648 02/02/20 0440 02/02/20  0121   SODIUM mmol/L 134* 132* 137   POTASSIUM mmol/L 3.3* 3.2* 3.0*   CHLORIDE mmol/L 87* 88* 89*   CO2 mmol/L 32.0* 31.0* 34.0*   BUN mg/dL 48* 47* 48*   CREATININE mg/dL 2.16* 2.42* 2.64*   GLUCOSE mg/dL 177* 258* 144*         BNP        TROPONIN  Results from last 7 days   Lab Units 02/02/20  1828   TROPONIN T ng/mL 0.036*       CoAg  Results from last 7 days   Lab Units 02/02/20  0121   INR  1.14*   APTT seconds 28.6       Creatinine Clearance  Estimated Creatinine Clearance: 40 mL/min (A) (by C-G formula based on SCr of 2.16 mg/dL (H)).    ABG        Radiology  Xr Ribs Bilateral 4+ View With Pa Chest    Result Date: 2/2/2020  1.No radiographic evidence of an acute displaced rib fracture.  A nondisplaced fracture could be radiographically occult. 2.No radiographic findings of acute cardiopulmonary abnormality.  Electronically Signed By-DR. Oliver Patterson MD On:2/2/2020 9:32 AM This report was finalized on [id number] by DR. Oliver Patterson MD.    Xr Shoulder 2+ View Right    Result Date: 2/2/2020  1.No radiographic findings of acute osseous shoulder abnormality. 2.Moderate to severe, clavicular joint degeneration and mild to moderate glenohumeral osteoarthritis. 3.Mild enthesopathy at the greater tuberosity, which could be associated with rotator cuff pathology.  Electronically Signed By-DR. Oliver Patterson MD  On:2/2/2020 9:34 AM This report was finalized on [id number] by DR. Oliver Patterson MD.    Xr Hand 3+ View Right    Result Date: 2/2/2020  1.No radiographic findings of acute osseous hand injury. 2.Mild to moderate osteoarthritis and osseous demineralization.  Electronically Signed By-DR. Oliver Patterson MD On:2/2/2020 9:07 AM This report was finalized on [id number] by DR. Oliver Patterson MD.    Xr Knee 1 Or 2 View Right    Result Date: 2/2/2020  1.No radiographic findings of acute osseous abnormality. 2.Mild tricompartmental osteoarthritis.  Electronically Signed By-DR. Oliver Patterson MD On:2/2/2020 8:45 AM This report was finalized on [id number] by DR. Oliver Patterson MD.    Xr Ankle 3+ View Left    Result Date: 2/2/2020  1.No radiographic findings of acute osseous left ankle injury. 2.Nonspecific diffuse soft tissue edema. 3.Mild osteoarthritis of the tibiotalar, subtalar, and midfoot joints. 4.Osseous demineralization.  Electronically Signed By-DR. Oliver Patterson MD On:2/2/2020 9:09 AM This report was finalized on [id number] by DR. Oliver Patterson MD.    Ct Head Without Contrast    Result Date: 2/2/2020  CT head: 1. Mild right frontal scalp soft tissue swelling. 2. No acute intracranial abnormality. 3. Mild volume loss and chronic microvascular ischemic changes. CT cervical spine: 1. No acute fracture or traumatic subluxation. 2. Moderate multilevel degenerative disc disease and facet arthropathy which is most pronounced at C3-4 with moderate to severe canal stenosis. Jevon Kauffman M.D. Neuroradiologist [email or web address] Thank you for this referral. This exam was interpreted by a fellowship trained neuroradiologist with subspeciality training in Neuroradiology. SLOT  68 Electronically signed by:  Jevon Kauffman M.D.  2/2/2020 12:46 AM    Ct Cervical Spine Without Contrast    Result Date: 2/2/2020  CT head: 1. Mild right frontal scalp soft tissue swelling. 2. No acute intracranial  abnormality. 3. Mild volume loss and chronic microvascular ischemic changes. CT cervical spine: 1. No acute fracture or traumatic subluxation. 2. Moderate multilevel degenerative disc disease and facet arthropathy which is most pronounced at C3-4 with moderate to severe canal stenosis. Jevon Kauffman M.D. Neuroradiologist [email or web address] Thank you for this referral. This exam was interpreted by a fellowship trained neuroradiologist with subspeciality training in Neuroradiology. SLOT  68 Electronically signed by:  Jevon Kauffman M.D.  2/2/2020 12:46 AM    Xr Hips Bilateral With Or Without Pelvis 2 View    Result Date: 2/2/2020  1.No definite radiographic evidence of acute osseous hip or pelvic injury at this time. 2.Status post right hip arthroplasty with heterotopic ossification and suspected chronic periosteal reaction along the proximal right femur, as before. 3.Mild left hip osteoarthritis. Degenerative changes are also noted at the sacroiliac joints and pubic symphysis.  Electronically Signed By-DR. Oliver Patterson MD On:2/2/2020 9:29 AM This report was finalized on [id number] by DR. Oliver Patterson MD.            EKG        I personally viewed and interpreted the patient's EKG/Telemetry data:    ECHOCARDIOGRAM:       STRESS MYOVIEW:    CARDIAC CATHETERIZATION:    OTHER:         Assessment/Plan       Anemia    Atrial fibrillation (CMS/HCC)    Chronic gout    Renal insufficiency    Coronary artery disease    Degenerative joint disease    Inflammatory arthritis    Sjogren's syndrome (CMS/HCC)    Dermatitis    Diabetic peripheral neuropathy (CMS/HCC)    Hyperlipidemia    Hypertension    Presence of cardiac pacemaker    Syncope and collapse      Syncope of unclear etiology  Initial pacemaker interrogation no evidence of any pacemaker dysfunction  Patient does have increasing episodes of atrial fibrillation at times requiring ATP for atrial tachycardia  Patient also currently showing heart  rate at 120 AV paced rhythm without any underlying sinus rhythm  Pacemaker needs to be re-interrogated to evaluate for PMT or tracking the atrial tachycardia  We will start beta-blockers  Continue anticoagulation  Cardiac work-up echocardiogram stress Myoview 2019  Stress Myoview reverse redistribution without any ischemia  Echocardiogram LVEF close to 50%  Cardiac cath 2000 1650 to 55% mid LAD less than 50% RCA disease  Chronic renal insufficiency suggest nephrology follow-up  We will also discuss with electrophysiology    I discussed the patients findings and my recommendations with patient and attending nurse    Jamel Jacome MD  02/03/20  6:44 PM

## 2020-02-03 NOTE — THERAPY EVALUATION
Patient Name: Zack Warner  : 1945    MRN: [id number]                              Today's Date: 2/3/2020       Admit Date: 2020    Visit Dx:     ICD-10-CM ICD-9-CM   1. Syncope and collapse R55 780.2   2. Hypokalemia E87.6 276.8   3. Hypochloremia E87.8 276.9   4. Injury of head, initial encounter S09.90XA 959.01   5. Multiple contusions T07.XXXA 924.8   6. Laceration of right ear lobe, initial encounter S01.311A 872.01     Patient Active Problem List   Diagnosis   • Abnormal cardiovascular stress test   • Acute bronchitis   • Anemia   • Atrial fibrillation (CMS/HCC)   • Carpal tunnel syndrome, bilateral   • Chest pain   • Chronic gout   • Renal insufficiency   • Coronary artery disease   • Cough   • Degenerative joint disease   • Inflammatory arthritis   • Sjogren's syndrome (CMS/HCC)   • Dermatitis   • Diabetic peripheral neuropathy (CMS/HCC)   • Edema   • Edema   • Encounter for immunization   • Encounter for other specified surgical aftercare   • Fever, unspecified   • Headache   • Neck pain   • Hyperlipidemia   • Hypertension   • Ingrown toenail   • Numbness of extremity   • Body mass index 45.0-49.9, adult (CMS/HCC)   • Obesity   • Osteoarthritis of hand   • Osteoarthritis of hip   • Osteoporosis   • Presence of cardiac pacemaker   • Shortness of breath   • Sick sinus syndrome (CMS/HCC)   • Status post hip replacement   • Swelling of lower extremity   • Type 2 diabetes mellitus (CMS/HCC)   • Attacks of weakness   • Bleeding internal hemorrhoids   • Chronic obstructive lung disease (CMS/HCC)   • Gastroesophageal reflux disease with ulceration   • Irregular bowel habits   • Irritable bowel syndrome   • Rectal hemorrhage   • Anemia due to blood loss   • Coronary arteriosclerosis   • Diabetes mellitus (CMS/HCC)   • Chronic systolic heart failure (CMS/HCC)   • Congestive heart failure (CMS/HCC)   • Disorder associated with type 2 diabetes mellitus (CMS/HCC)   • Hyperparathyroidism (CMS/HCC)   •  Chronic renal insufficiency, stage III (moderate) (CMS/HCC)   • Syncope and collapse     Past Medical History:   Diagnosis Date   • Chronic kidney disease    • Diabetes mellitus (CMS/HCC)    • Sjogren's syndrome (CMS/HCC)      Past Surgical History:   Procedure Laterality Date   • BACK SURGERY     • CATARACT EXTRACTION, BILATERAL     • HIP SURGERY  04/2012    total hip arthroplasty Rt hip   • LAPAROSCOPIC CHOLECYSTECTOMY     • NECK SURGERY     • TOTAL THYROIDECTOMY       General Information     Row Name 02/03/20 1104          PT Evaluation Time/Intention    Document Type  evaluation  -AB     Mode of Treatment  physical therapy  -AB     Row Name 02/03/20 1104          General Information    Patient Profile Reviewed?  yes  -AB     Prior Level of Function  independent:;all household mobility;home management;transfer;dressing;ADL's Pt was previously indep. w/ all mobility, used cane for walking on uneven surfaces (grass). Indep w/ IADLS.   -AB     Existing Precautions/Restrictions  fall  -AB     Barriers to Rehab  none identified  -AB     Row Name 02/03/20 1104          Relationship/Environment    Lives With  spouse  -AB     Row Name 02/03/20 1104          Resource/Environmental Concerns    Current Living Arrangements  home/apartment/condo  -AB     Row Name 02/03/20 1104          Home Main Entrance    Number of Stairs, Main Entrance  -- one flight to basement for laundry, 2 steps into main entrance  -AB     Row Name 02/03/20 1104          Cognitive Assessment/Intervention- PT/OT    Orientation Status (Cognition)  oriented x 4  -AB     Row Name 02/03/20 1104          Safety Issues, Functional Mobility    Impairments Affecting Function (Mobility)  endurance/activity tolerance;strength;range of motion (ROM)  -AB       User Key  (r) = Recorded By, (t) = Taken By, (c) = Cosigned By    Initials Name Provider Type    AB Tia Hinton, PT Physical Therapist        Mobility     Row Name 02/03/20 1108          Bed Mobility  "Assessment/Treatment    Bed Mobility Assessment/Treatment  bed mobility (all) activities  -AB     [place] Level (Bed Mobility)  conditional independence used bed rail for sup to sit  -AB     Assistive Device (Bed Mobility)  bed rails  -AB     Row Name 02/03/20 1108          Bed-Chair Transfer    Bed-Chair [place] (Transfers)  supervision  -AB     Row Name 02/03/20 1108          Sit-Stand Transfer    Sit-Stand [place] (Transfers)  contact guard stood 2x with RW w/ close sup, 1x without RW & CGA.   -AB     Assistive Device (Sit-Stand Transfers)  walker, front-wheeled  -AB     Row Name 02/03/20 1108          Gait/Stairs Assessment/Training    Gait/Stairs Assessment/Training  gait/ambulation assistive device;distance ambulated  -AB     [place] Level (Gait)  contact guard;supervision  -AB     Assistive Device (Gait)  walker, front-wheeled  -AB     Pattern (Gait)  swing-through  -AB     Deviations/Abnormal Patterns (Gait)  base of support, wide;gait speed decreased chronic gait deviation from spine surgery, flexed forward at hips due to pinched nerves from surgery in L-spine and \"pinching\" feeling if he tries to extend low back and tuck hips  -AB     Bilateral Gait Deviations  forward flexed posture  -AB       User Key  (r) = Recorded By, (t) = Taken By, (c) = Cosigned By    Initials Name Provider Type    AB Tia Hinton, PT Physical Therapist        Obj/Interventions     Row Name 02/03/20 1111          General ROM    GENERAL ROM COMMENTS  B UE WFLs, B LE WFLs  -AB     Row Name 02/03/20 1111          MMT (Manual Muscle Testing)    General MMT Comments  B UEs3+/5, R LE hip flexion, knee extn, DF/PF 4+/5. LLE hip flexion 4-/5, knee extn4/5, DF/PF 4+/5  -AB     Row Name 02/03/20 1111          Therapeutic Exercise    Upper Extremity (Therapeutic Exercise)  -- B LE seated MIP, LAQ, APs x10 reps prior to amb.  -AB     Exercise Type (Therapeutic Exercise)  AROM (active range of motion)  -AB     Position " (Therapeutic Exercise)  seated  -AB     Expected Outcome (Therapeutic Exercise)  improve functional stability;facilitate normal movement patterns;improve functional tolerance, self-care activity  -AB     Row Name 02/03/20 1111          Static Sitting Balance    Level of [place] (Unsupported Sitting, Static Balance)  independent  -AB     Sitting Position (Unsupported Sitting, Static Balance)  sitting on edge of bed  -AB     Time Able to Maintain Position (Unsupported Sitting, Static Balance)  3 to 4 minutes  -AB     Row Name 02/03/20 1111          Dynamic Sitting Balance    Level of [place], Reaches Outside Midline (Sitting, Dynamic Balance)  independent  -AB     Sitting Position, Reaches Outside Midline (Sitting, Dynamic Balance)  sitting on edge of bed  -AB     Row Name 02/03/20 1111          Static Standing Balance    Level of [place] (Supported Standing, Static Balance)  supervision  -AB     Time Able to Maintain Position (Supported Standing, Static Balance)  1 to 2 minutes  -AB     Row Name 02/03/20 1111          Dynamic Standing Balance    Level of [place], Reaches Outside Midline (Standing, Dynamic Balance)  contact guard assist;supervision;1 person assist  -AB     Time Able to Maintain Position, Reaches Outside Midline (Standing, Dynamic Balance)  3 to 4 minutes  -AB     Assistive Device Utilized (Supported Standing, Dynamic Balance)  walker, rolling  -AB     Row Name 02/03/20 1111          Sensory Assessment/Intervention    Sensory General Assessment  -- B Peripheral neuropathy  -AB       User Key  (r) = Recorded By, (t) = Taken By, (c) = Cosigned By    Initials Name Provider Type    AB Tia Hinton, PT Physical Therapist        Goals/Plan     Row Name 02/03/20 1123          Bed Mobility Goal 1 (PT)    Activity/Assistive Device (Bed Mobility Goal 1, PT)  bed mobility activities, all  -AB     [place] Level/Cues Needed (Bed Mobility Goal 1, PT)  independent  -AB     Time Frame  (Bed Mobility Goal 1, PT)  2 weeks  -AB     Row Name 02/03/20 1123          Transfer Goal 1 (PT)    Activity/Assistive Device (Transfer Goal 1, PT)  transfers, all;walker, rolling  -AB     [place] Level/Cues Needed (Transfer Goal 1, PT)  conditional independence  -AB     Row Name 02/03/20 1123          Gait Training Goal 1 (PT)    Activity/Assistive Device (Gait Training Goal 1, PT)  gait (walking locomotion);walker, rolling  -AB     [place] Level (Gait Training Goal 1, PT)  conditional independence  -AB     Distance (Gait Goal 1, PT)  150 ft  -AB     Time Frame (Gait Training Goal 1, PT)  2 weeks  -AB       User Key  (r) = Recorded By, (t) = Taken By, (c) = Cosigned By    Initials Name Provider Type    AB Tia Hinton, PT Physical Therapist        Clinical Impression     Row Name 02/03/20 1116 02/03/20 1115       Plan of Care Review    Plan of Care Reviewed With  patient;spouse  -AB  patient;spouse  -AB    Row Name 02/03/20 1116          Physical Therapy Clinical Impression    Patient/Family Goals Statement (PT Clinical Impression)  Pt s/p 2 syncopal episodes (one at home & one coming into ED w/ spouse) w/ falls presents close to baseline w/ functional mobility and amb. Pt performed all tfs w/ CGA/ supervision. Pt amb 100 ft w/ RW w/ chronic forward flexion of trunk from previous spine surgery. Orthostatics taken & vitals stable between positions. Pt asymptomatic during PT eval. Will keep on caseload for strengthening & endurance as pt used RW during eval & typically does not at home.  Anticipate not PT needs upon DC. May benefit from RW for home use. Will follow 3x/wk.   -AB     Criteria for Skilled Interventions Met (PT Clinical Impression)  yes  -AB     Rehab Potential (PT Clinical Summary)  good, to achieve stated therapy goals  -AB     Predicted Duration of Therapy (PT)  2 weeks  -AB     Row Name 02/03/20 1116          Vital Signs    Pre Systolic BP Rehab  98 supine  -AB     Pre Treatment  Diastolic BP  61  -AB     Intra Systolic BP Rehab  97 sitting  -AB     Intra Treatment Diastolic BP  67  -AB     Post Systolic BP Rehab  95 standing  -AB     Post Treatment Diastolic BP  62  -AB     Pretreatment Heart Rate (beats/min)  75  -AB     Intratreatment Heart Rate (beats/min)  75  -AB     O2 Delivery Pre Treatment  room air  -AB     Intra SpO2 (%)  97  -AB     O2 Delivery Intra Treatment  room air  -AB     Post SpO2 (%)  97  -AB     O2 Delivery Post Treatment  room air  -AB     Pre Patient Position  Supine  -AB     Intra Patient Position  Sitting  -AB     Post Patient Position  Sitting  -AB     Row Name 02/03/20 1116          Positioning and Restraints    Pre-Treatment Position  in bed  -AB     Post Treatment Position  chair  -AB     In Chair  call light within reach;with family/caregiver;exit alarm on  -AB       User Key  (r) = Recorded By, (t) = Taken By, (c) = Cosigned By    Initials Name Provider Type    Tia Chaudhari, PT Physical Therapist        Outcome Measures    No documentation.         PT Recommendation and Plan  Planned Therapy Interventions (PT Eval): gait training, patient/family education, strengthening, transfer training, postural re-education  Outcome Summary/Treatment Plan (PT)  Anticipated Equipment Needs at Discharge (PT): front wheeled walker  Anticipated Discharge Disposition (PT): home  Plan of Care Reviewed With: patient, spouse     Time Calculation:   PT Charges     Row Name 02/03/20 1131             Time Calculation    Start Time  0928  -AB      Stop Time  1005  -AB      Time Calculation (min)  37 min  -AB      PT Received On  02/03/20  -AB      PT - Next Appointment  02/05/20  -AB      PT Goal Re-Cert Due Date  02/17/20  -AB         Time Calculation- PT    Total Timed Code Minutes- PT  23 minute(s)  -AB        User Key  (r) = Recorded By, (t) = Taken By, (c) = Cosigned By    Initials Name Provider Type    Tia Chaudhari, PT Physical Therapist        Therapy Charges  for Today     Code Description Service Date Service Provider Modifiers Qty    35518577992 HC PT EVAL HIGH COMPLEXITY 4 2/3/2020 Tia Hinton, PT GP 1    50865603551 HC GAIT TRAINING EA 15 MIN 2/3/2020 Tia Hinton, PT GP 1    [id number] HC PT THERAPEUTIC ACT EA 15 MIN 2/3/2020 Tia Hinton, PT GP 1               Tia Hinton, PT  2/3/2020

## 2020-02-03 NOTE — PROGRESS NOTES
[facility] Daily Progress Note      Hospitalist Team  LOS 0 days      Patient Care Team:  Briana Elizabeth MD as PCP - General  Briana Elizabeth MD as PCP - Family Medicine  Briana Elizabeth MD as PCP - Claims Attributed    Patient Location: 377/1      Subjective   Subjective     Chief Complaint / Subjective  Chief Complaint   Patient presents with   • Head Laceration     right ear laceration, pt blacked out getting out of car, falling to ground, right forehead abrasion         Brief Synopsis of Hospital Course/HPI  Mr. Warner is a 74 y.o.  presents to [facility] complaining of syncope with fall and collapse           74-year-old male presents to the ER with a chief complaint of syncope today with loss of consciousness and injury to the right ear when hitting something in the bathroom.  The patient states he got up to go to the bathroom and the next thing he knew he was being awakened by his wife on the bathroom floor.  Patient had a similar episode of loss of consciousness in October 2019 and despite hospitalization x3 days the cause was not identified.  The patient does have a history of sick sinus syndrome with pacemaker which was interrogated at that time without identification of significant abnormality.  The patient also had neurologic work-up with CT and MRI of the brain at that time which did not reveal definitive cause of syncopal episode.  Patient states he felt well earlier in the day without any recent subjective fever or chills, increased cough, chest pain or other bothersome symptoms.      Date:: 2/3/2020: Patient seen and examined and patient denies any new complaints.  I spoke to him about his syncopal episodes and at this time we are going to keep him for further work-up.    Review of Systems   All other systems reviewed and are negative.        Objective   Objective      Vital Signs  Temp:  [98.1 °F (36.7 °C)-98.8 °F (37.1 °C)] 98.7 °F (37.1  "°C)  Heart Rate:  [] 90  Resp:  [16-17] 17  BP: ()/(59-74) 105/65  Oxygen Therapy  SpO2: 95 %  Pulse Oximetry Type: Intermittent  Device (Oxygen Therapy): room air  Flowsheet Rows      First Filed Value   Admission Height  172.7 cm (68\") Documented at 02/02/2020 0033   Admission Weight  126 kg (276 lb 10.8 oz) Documented at 02/02/2020 0033        Intake & Output (last 3 days)       01/31 0701 - 02/01 0700 02/01 0701 - 02/02 0700 02/02 0701 - 02/03 0700 02/03 0701 - 02/04 0700    P.O.   720 360    IV Piggyback  1000      Total Intake(mL/kg)  1000 (7.9) 720 (5.7) 360 (2.9)    Urine (mL/kg/hr)   1875 (0.6) 400 (0.3)    Total Output   1875 400    Net  +1000 -1155 -40                Lines, Drains & Airways    Active LDAs     Name:   Placement date:   Placement time:   Site:   Days:    Peripheral IV 02/02/20 0121 Left Antecubital   02/02/20    0121    Antecubital   1                  Physical Exam:    Physical Exam   Constitutional: He is oriented to person, place, and time. He appears well-developed and well-nourished. No distress.   HENT:   Head: Normocephalic and atraumatic.   Right Ear: External ear normal.   Left Ear: External ear normal.   Nose: Nose normal.   Mouth/Throat: Oropharynx is clear and moist. No oropharyngeal exudate.   Eyes: Pupils are equal, round, and reactive to light. Conjunctivae and EOM are normal. Right eye exhibits no discharge. Left eye exhibits no discharge. No scleral icterus.   Neck: Normal range of motion. No JVD present. No tracheal deviation present. No thyromegaly present.   Cardiovascular: Normal rate, regular rhythm, normal heart sounds and intact distal pulses. Exam reveals no gallop and no friction rub.   No murmur heard.  Pulmonary/Chest: Effort normal and breath sounds normal. No stridor. No respiratory distress. He has no wheezes. He has no rales. He exhibits no tenderness.   Abdominal: Soft. Bowel sounds are normal. He exhibits no distension and no mass. There is no " tenderness. There is no rebound and no guarding. No hernia.   Musculoskeletal: Normal range of motion. He exhibits no edema, tenderness or deformity.   Lymphadenopathy:     He has no cervical adenopathy.   Neurological: He is alert and oriented to person, place, and time. No cranial nerve deficit or sensory deficit. He exhibits normal muscle tone. Coordination normal.   Skin: Skin is warm and dry. No rash noted. He is not diaphoretic. No erythema.   Psychiatric: He has a normal mood and affect. His behavior is normal.   Nursing note and vitals reviewed.           Wounds (last 24 hours)      LDA Wound     Row Name 02/03/20 0750 02/02/20 2100          Wound Right ear Traumatic    Wound - Properties Group Side: Right  -DD Location: ear  -DD Primary Wound Type: Traumatic  -DD Additional Comments: 8 stitches to ear  -DD    Dressing Appearance  open to air  -AR  open to air  -TC     Base  blanchable;clean;dry  -AR  blanchable;clean;dry  -TC        Wound Right scalp Abrasion    Wound - Properties Group Side: Right  -DD Location: scalp  -DD Primary Wound Type: Abrasion  -DD    Closure  Open to air  -AR  Open to air  -TC     Base  blanchable;clean;red  -AR  blanchable;clean;red  -TC        Wound Right anterior;lower;proximal leg Abrasion    Wound - Properties Group Side: Right  -DD Orientation: anterior;lower;proximal  -DD Location: leg  -DD Primary Wound Type: Abrasion  -DD    Dressing Appearance  dry;open to air  -AR  dry;open to air  -TC     Base  blanchable;red  -AR  blanchable;red  -TC        Wound Right cervical spine Abrasion    Wound - Properties Group Side: Right  -DD Location: cervical spine  -DD Primary Wound Type: Abrasion  -DD    Dressing Appearance  dry;open to air  -AR  dry;open to air  -TC     Base  blanchable;clean  -AR  blanchable;clean  -TC       User Key  (r) = Recorded By, (t) = Taken By, (c) = Cosigned By    Initials Name Provider Type    DD Mary Shelton LPN Licensed Nurse    Ana Rosa Galvez LPN  Licensed Nurse    Tomasa Aguilar LPN Licensed Nurse          Procedures:              Results Review:     I reviewed the patient's new clinical results.      Lab Results (last 24 hours)     Procedure Component Value Units Date/Time    Basic Metabolic Panel [[id number]] Collected:  02/03/20 1648    Specimen:  Blood Updated:  02/03/20 1709    POC Glucose Once [[id number]]  (Abnormal) Collected:  02/03/20 1637    Specimen:  Blood Updated:  02/03/20 1641     Glucose 177 mg/dL      Comment: Serial Number: [id number]:  [id number]       POC Glucose Once [[id number]]  (Abnormal) Collected:  02/03/20 1158    Specimen:  Blood Updated:  02/03/20 1210     Glucose 236 mg/dL      Comment: Serial Number: [id number]:  [id number]       Sedimentation Rate [[id number]]  (Abnormal) Collected:  02/03/20 0655    Specimen:  Blood Updated:  02/03/20 0759     Sed Rate 46 mm/hr     POC Glucose Once [[id number]]  (Abnormal) Collected:  02/02/20 2017    Specimen:  Blood Updated:  02/02/20 2018     Glucose 144 mg/dL      Comment: Serial Number: [id number]:  [id number]       Extra Tubes [[id number]] Collected:  02/02/20 1828    Specimen:  Blood, Venous Line Updated:  02/02/20 1930    Narrative:       The following orders were created for panel order Extra Tubes.  Procedure                               Abnormality         Status                     ---------                               -----------         ------                     Gold Top - SST[[id number]]                                   Final result                 Please view results for these tests on the individual orders.    Gold Top - SST [[id number]] Collected:  02/02/20 1828    Specimen:  Blood Updated:  02/02/20 1930     Extra Tube Hold for add-ons.     Comment: Auto resulted.       Troponin [[id number]]  (Abnormal) Collected:  02/02/20 1828    Specimen:  Blood Updated:  02/02/20 1912     Troponin T 0.036 ng/mL     Narrative:       Troponin T Reference Range:  <= 0.03  ng/mL-   Negative for AMI  >0.03 ng/mL-     Abnormal for myocardial necrosis.  Clinicians would have to utilize clinical acumen, EKG, Troponin and serial changes to determine if it is an Acute Myocardial Infarction or myocardial injury due to an underlying chronic condition.       Results may be falsely decreased if patient taking Biotin.      POC Glucose Once [[id number]]  (Abnormal) Collected:  02/02/20 1756    Specimen:  Blood Updated:  02/02/20 1757     Glucose 178 mg/dL      Comment: Serial Number: [id number]:  [id number]           No results found for: HGBA1C  Results from last 7 days   Lab Units 02/02/20  0121   INR  1.14*           No results found for: LIPASE  Lab Results   Component Value Date    CHOL 114 04/23/2019    TRIG 120 04/23/2019    HDL 38 (L) 04/23/2019    LDL 62 04/23/2019       No results found for: INTRAOP, PREDX, FINALDX, COMDX    Microbiology Results (last 10 days)     ** No results found for the last 240 hours. **          ECG/EMG Results (most recent)     Procedure Component Value Units Date/Time    ECG 12 Lead [[id number]] Collected:  02/02/20 0100     Updated:  02/02/20 0745    Narrative:       HEART RATE= 86  bpm  RR Interval= 696  ms  CA Interval= 187  ms  P Horizontal Axis= 223  deg  P Front Axis= -71  deg  QRSD Interval= 184  ms  QT Interval= 453  ms  QRS Axis= -58  deg  T Wave Axis= 112  deg  - ABNORMAL ECG -  Atrial-ventricular dual-paced rhythm  When compared with ECG of 22-Apr-2019 11:26:20,  No significant change  Electronically Signed By: Michael Bearden (Fabio) 02-Feb-2020 07:45:13  Date and Time of Study: 2020-02-02 01:00:08    Adult Transthoracic Echo Complete W/ Cont if Necessary Per Protocol [[id number]] Collected:  02/03/20 1421     Updated:  02/03/20 1618     BSA 2.3 m^2       CV ECHO STEPHEN - RVDD 3.5 cm      IVSd 1.5 cm      LVIDd 5.0 cm      LVIDs 3.7 cm      LVPWd 1.6 cm      IVS/LVPW 0.91     FS 24.8 %      EDV(Teich) 116.7 ml      ESV(Teich) 59.6 ml      EF(Teich)  48.9 %      EDV(cubed) 122.9 ml      ESV(cubed) 52.3 ml      EF(cubed) 57.5 %      LV mass(C)d 335.7 grams      LV mass(C)dI 143.0 grams/m^2      SV(Teich) 57.1 ml      SI(Teich) 24.3 ml/m^2      SV(cubed) 70.6 ml      SI(cubed) 30.1 ml/m^2      Ao root diam 3.9 cm      Ao root area 11.9 cm^2      ACS 2.3 cm      asc Aorta Diam 3.6 cm      LVOT diam 2.1 cm      LVOT area 3.6 cm^2      EDV(MOD-sp4) 62.1 ml      ESV(MOD-sp4) 23.4 ml      EF(MOD-sp4) 62.3 %      EDV(MOD-sp2) 21.4 ml      ESV(MOD-sp2) 13.4 ml      EF(MOD-sp2) 37.2 %      SV(MOD-sp4) 38.7 ml      SI(MOD-sp4) 16.5 ml/m^2      SV(MOD-sp2) 8.0 ml      SI(MOD-sp2) 3.4 ml/m^2      Ao root area (BSA corrected) 1.7     LV Dowell Vol (BSA corrected) 26.5 ml/m^2      LV Sys Vol (BSA corrected) 10.0 ml/m^2      MV E max sagrario 25.1 cm/sec      MV A max sagrario 42.0 cm/sec      MV E/A 0.6     MV V2 max 84.8 cm/sec      MV max PG 2.9 mmHg      MV V2 mean 44.7 cm/sec      MV mean PG 0.98 mmHg      MV V2 VTI 19.8 cm      MVA(VTI) 2.2 cm^2      MV dec slope 281.2 cm/sec^2      MV dec time 0.17 sec      Ao pk sagrario 117.6 cm/sec      Ao max PG 5.5 mmHg      Ao max PG (full) 3.0 mmHg      Ao V2 mean 83.7 cm/sec      Ao mean PG 3.1 mmHg      Ao mean PG (full) 1.6 mmHg      Ao V2 VTI 19.1 cm      MELVINA(I,A) 2.3 cm^2      MELVINA(I,D) 2.3 cm^2      MELVINA(V,A) 2.4 cm^2      MELVINA(V,D) 2.4 cm^2      LV V1 max PG 2.5 mmHg      LV V1 mean PG 1.4 mmHg      LV V1 max 79.2 cm/sec      LV V1 mean 55.4 cm/sec      LV V1 VTI 12.1 cm      SV(Ao) 227.3 ml      SI(Ao) 96.8 ml/m^2      SV(LVOT) 43.4 ml      SI(LVOT) 18.5 ml/m^2      PA V2 max 89.7 cm/sec      PA max PG 3.2 mmHg      PA max PG (full) 0.88 mmHg      PA acc time 0.12 sec      RV V1 max PG 2.3 mmHg      RV V1 mean PG 1.1 mmHg      RV V1 max 76.5 cm/sec      RV V1 mean 47.6 cm/sec      RV V1 VTI 14.5 cm      TR max sagrario 216.1 cm/sec      RVSP(TR) 26.7 mmHg      RAP systole 8.0 mmHg      PA pr(Accel) 27.1 mmHg      BH CV ECHO STEPHEN - BZI_BMI 42.1  kilograms/m^2       CV ECHO [facility]PHEN - BSA([facility]) 2.5 m^2       CV ECHO STEPHEN - BZI_METRIC_WEIGHT 125.6 kg       CV ECHO STEPHEN - BZI_METRIC_HEIGHT 172.7 cm      EF(MOD-bp) 53.0 %      LA dimension(2D) 4.2 cm     ECG 12 Lead [[id number]] Collected:  02/03/20 1636     Updated:  02/03/20 1637    Narrative:       HEART RATE= 116  bpm  RR Interval= 519  ms  ME Interval= 98  ms  P Horizontal Axis= 238  deg  P Front Axis= 161  deg  QRSD Interval= 186  ms  QT Interval= 445  ms  QRS Axis= -51  deg  T Wave Axis= 115  deg  - ABNORMAL ECG -  Ventricular-paced rhythm  Electronically Signed By:   Date and Time of Study: 2020-02-03 16:36:38          Results for orders placed during the hospital encounter of 02/02/20   Duplex Carotid Ultrasound CAR    Narrative · Proximal right internal carotid artery plaque without significant   stenosis.  · Proximal left internal carotid artery plaque without significant   stenosis.               Xr Ribs Bilateral 4+ View With Pa Chest    Result Date: 2/2/2020  1.No radiographic evidence of an acute displaced rib fracture.  A nondisplaced fracture could be radiographically occult. 2.No radiographic findings of acute cardiopulmonary abnormality.  Electronically Signed By-DR. Oliver Patterson MD On:2/2/2020 9:32 AM This report was finalized on [id number] by DR. Oliver Patterson MD.    Xr Shoulder 2+ View Right    Result Date: 2/2/2020  1.No radiographic findings of acute osseous shoulder abnormality. 2.Moderate to severe, clavicular joint degeneration and mild to moderate glenohumeral osteoarthritis. 3.Mild enthesopathy at the greater tuberosity, which could be associated with rotator cuff pathology.  Electronically Signed By-DR. Oliver Patterson MD On:2/2/2020 9:34 AM This report was finalized on [id number] by DR. Oliver Patterson MD.    Xr Hand 3+ View Right    Result Date: 2/2/2020  1.No radiographic findings of acute osseous hand injury. 2.Mild to moderate osteoarthritis and osseous  demineralization.  Electronically Signed By-DR. Oliver Patterson MD On:2/2/2020 9:07 AM This report was finalized on [id number] by DR. Oliver Patterson MD.    Xr Knee 1 Or 2 View Right    Result Date: 2/2/2020  1.No radiographic findings of acute osseous abnormality. 2.Mild tricompartmental osteoarthritis.  Electronically Signed By-DR. Oliver Patterson MD On:2/2/2020 8:45 AM This report was finalized on [id number] by DR. Oliver Patterson MD.    Xr Ankle 3+ View Left    Result Date: 2/2/2020  1.No radiographic findings of acute osseous left ankle injury. 2.Nonspecific diffuse soft tissue edema. 3.Mild osteoarthritis of the tibiotalar, subtalar, and midfoot joints. 4.Osseous demineralization.  Electronically Signed By-DR. Oliver Patterson MD On:2/2/2020 9:09 AM This report was finalized on [id number] by DR. Oliver Patterson MD.    Ct Head Without Contrast    Result Date: 2/2/2020  CT head: 1. Mild right frontal scalp soft tissue swelling. 2. No acute intracranial abnormality. 3. Mild volume loss and chronic microvascular ischemic changes. CT cervical spine: 1. No acute fracture or traumatic subluxation. 2. Moderate multilevel degenerative disc disease and facet arthropathy which is most pronounced at C3-4 with moderate to severe canal stenosis. Jevon Kauffman M.D. Neuroradiologist [email or web address] Thank you for this referral. This exam was interpreted by a fellowship trained neuroradiologist with subspeciality training in Neuroradiology. SLOT  68 Electronically signed by:  Jevon Kauffman M.D.  2/2/2020 12:46 AM    Ct Cervical Spine Without Contrast    Result Date: 2/2/2020  CT head: 1. Mild right frontal scalp soft tissue swelling. 2. No acute intracranial abnormality. 3. Mild volume loss and chronic microvascular ischemic changes. CT cervical spine: 1. No acute fracture or traumatic subluxation. 2. Moderate multilevel degenerative disc disease and facet arthropathy which is most pronounced at C3-4  with moderate to severe canal stenosis. Jevon Kauffman M.D. Neuroradiologist [email or web address] Thank you for this referral. This exam was interpreted by a fellowship trained neuroradiologist with subspeciality training in Neuroradiology. SLOT  68 Electronically signed by:  Jevon Kauffman M.D.  2/2/2020 12:46 AM    Xr Hips Bilateral With Or Without Pelvis 2 View    Result Date: 2/2/2020  1.No definite radiographic evidence of acute osseous hip or pelvic injury at this time. 2.Status post right hip arthroplasty with heterotopic ossification and suspected chronic periosteal reaction along the proximal right femur, as before. 3.Mild left hip osteoarthritis. Degenerative changes are also noted at the sacroiliac joints and pubic symphysis.  Electronically Signed By-DR. Oliver Patterson MD On:2/2/2020 9:29 AM This report was finalized on [id number] by DR. Oliver Patterson MD.          Xrays, labs reviewed personally by physician.    Medication Review:   I have reviewed the patient's current medication list      Scheduled Meds    apixaban 5 mg Oral Q12H   FLUoxetine 10 mg Oral Daily   folic acid 1 mg Oral Daily   gabapentin 400 mg Oral BID   insulin glargine 80 Units Subcutaneous QAM   insulin lispro 0-24 Units Subcutaneous 4x Daily With Meals & Nightly   insulin lispro 25 Units Subcutaneous Daily Before Supper   insulin lispro 30 Units Subcutaneous Daily With Breakfast & Lunch   levothyroxine 50 mcg Oral Q AM   pantoprazole 40 mg Oral QAM   sodium chloride 10 mL Intravenous Q12H   tamsulosin 0.4 mg Oral Nightly       Meds Infusions       Meds PRN  •  acetaminophen **OR** acetaminophen **OR** acetaminophen  •  albuterol  •  dextrose  •  dextrose  •  diazePAM  •  glucagon (human recombinant)  •  HYDROcodone-acetaminophen  •  insulin lispro **AND** insulin lispro  •  ondansetron **OR** ondansetron  •  [COMPLETED] Insert peripheral IV **AND** sodium chloride  •  sodium chloride        Assessment/Plan      Assessment/Plan     Active Hospital Problems    Diagnosis  POA   • Syncope and collapse [R55]  Yes   • Coronary artery disease [I25.10]  Yes   • Anemia [D64.9]  Yes   • Chronic gout [M1A.9XX0]  Yes   • Renal insufficiency [N28.9]  Yes   • Inflammatory arthritis [M19.90]  Yes   • Sjogren's syndrome (CMS/HCC) [M35.00]  Yes   • Dermatitis [L30.9]  Yes   • Atrial fibrillation (CMS/HCC) [I48.91]  Yes   • Diabetic peripheral neuropathy (CMS/HCC) [E11.42]  Yes   • Degenerative joint disease [M19.90]  Yes      Resolved Hospital Problems   No resolved problems to display.       MEDICAL DECISION MAKING COMPLEXITY BY PROBLEM:     Syncope with unknown etiology  -Patient may have orthostatic hypotension could be because of syncope as patient is on hydralazine so we are stopping that.  Patient also symptomatically hydrated and has worsening of renal functions for which we have already obtained renal consult.  I am ordering echocardiogram to check his LV functions as well as check his right-sided pressure.  Also I am ordering venous Doppler to rule out DVT though unlikely as patient is on long-term anticoagulation.  Patient is also on Flomax which can cause orthostatic dizziness or syncopal episode.  MRI of the brain cannot be done because patient has pacemaker which was placed 4 to 5 years ago.  Cardiology has seen the patient    Acute kidney injury with chronic kidney disease stage III and nephrology has been consulted.  Avoid nephrotoxic medications.    Atrial fibrillation status post pacemaker placement and patient is on long-term anticoagulation    Rheumatoid arthritis/Sjogren syndrome and patient has history of several musculoskeletal complaints as well as inflammatory arthritis.    Diabetes mellitus type 2 on insulin and check Accu-Cheks and use sliding scale insulin    GERD on PPI continue    VTE Prophylaxis - Eliquis (home med).      Code Status -   Code Status and Medical Interventions:   Ordered at: 02/02/20 0416      Code Status:    CPR     Medical Interventions (Level of Support Prior to Arrest):    Full       Discharge Planning      SLP OP Goals     Row Name 02/03/20 1131          Time Calculation    PT Goal Re-Cert Due Date  02/17/20  -AB       User Key  (r) = Recorded By, (t) = Taken By, (c) = Cosigned By    Initials Name Provider Type    AB Tia Hinton, PT Physical Therapist          Destination      Coordination has not been started for this encounter.      Durable Medical Equipment      Coordination has not been started for this encounter.      Dialysis/Infusion      Coordination has not been started for this encounter.      Home Medical Care      Coordination has not been started for this encounter.      Therapy      Coordination has not been started for this encounter.      Community Resources      Coordination has not been started for this encounter.            Electronically signed by Larry Werner MD, 02/03/20, 5:15 PM.  [facility] Hospitalist Team

## 2020-02-03 NOTE — PROGRESS NOTES
PA Student Note     Primary Care : Briana Elizabeth MD    CHIEF COMPLAINT: Syncope and collapse     HISTORY OF PRESENT ILLNESS:    Mr. Warner is a 74 y.o. male with a past medical history of CKD, Sjogren's syndrome, BPH, GERD, DM type II, HTN, RA, sick sinus syndrome with pacemaker, and hypothyroidism s/p thyroidectomy. He presented to the ER on 2/2/20 with a chief complaint of syncope and collapse which occurred at home while walking into the bathroom. Collapse occurred again while walking into the ED. He does not recall the episodes. Regarding the first episode, he states that he only remembers getting up to go to the bathroom and then being awakened by his wife. He denied any preceding feelings of fever, chills, cough, or chest pain in the ER on 2/2.   An episode similar to this occurred in October 2019. After 3 days of inpatient work-up, no cause was identified. The patient's history of SSS was not attributed to the October episode. CT and MRI did not reveal a potential cause of the episode.    In the ER on 2/2, laceration repair was completed on his right ear. He was also found to have multiple contusions and abrasion of his right forehead.Troponin was 0.048, which was relatively unchanged from June 2019. Creatinine was elevated to 2.64 with an eGFR of 24. Intermittent A-fib and a ventricular paced rhythm were found on ECG, which were unchanged from previous interrogations. Head CT and cervical spine CT with contrast showed no acute pathology. Imaging of bilateral hips, right knee, right shoulder, and bilateral wrists showed no acute abnormalities. He was found to be hypochloremic and hypokalemic.     Today, he is stable without new episodes of syncope. Cardiology consult was completed on 2/2, with recommendations to check ESR and CRP to rule out temporal arteritis. Cardiology also agreed with obtaining orthostatic vital signs and to avoid vasodilators (Hydralazine discontinued for hospital course). Today, his  ESR is mildly elevated at 46 and CRP is 2.64. His imaging revealed no concerning findings as the cause of his syncope on CT or MRI. He is denying his scheduled diabetes injections aside from his sliding scale, stating that his current regimen is Lantus 49 units in the AM and PM along with his sliding scale.       Past Medical History:   Diagnosis Date   • Chronic kidney disease    • Diabetes mellitus (CMS/HCC)    • Sjogren's syndrome (CMS/HCC)        Past Surgical History:   Procedure Laterality Date   • BACK SURGERY     • CATARACT EXTRACTION, BILATERAL     • HIP SURGERY  04/2012    total hip arthroplasty Rt hip   • LAPAROSCOPIC CHOLECYSTECTOMY     • NECK SURGERY     • TOTAL THYROIDECTOMY         Family History   Problem Relation Age of Onset   • Hypertension Mother    • Heart disease Mother    • Alzheimer's disease Father    • Heart disease Brother    • Hypertension Daughter    • Diabetes Daughter    • Heart disease Brother    • Arthritis Brother    • No Known Problems Brother    • No Known Problems Brother        Social History     Tobacco Use   • Smoking status: Former Smoker     Types: Cigars   • Smokeless tobacco: Former User     Types: Chew   Substance Use Topics   • Alcohol use: No     Frequency: Never   • Drug use: No       Medications Prior to Admission   Medication Sig Dispense Refill Last Dose   • albuterol sulfate HFA (PROAIR HFA) 108 (90 Base) MCG/ACT inhaler Inhale 2 puffs Every 4 (Four) Hours As Needed for Wheezing or Shortness of Air.   Taking   • apixaban (ELIQUIS) 5 MG tablet tablet Take 5 mg by mouth Every 12 (Twelve) Hours.      • calcitriol (ROCALTROL) 0.25 MCG capsule Take 0.25 mcg by mouth Daily.   Taking   • Calcium Carbonate-Vit D-Min (CALCIUM 1200) 3471-6420 MG-UNIT chewable tablet Chew 1 tablet Daily.   Taking   • diazePAM (VALIUM) 5 MG tablet Take 5 mg by mouth Every 8 (Eight) Hours As Needed for Anxiety.   Taking   • docusate sodium ([employer] STOOL SOFTENER) 100 MG capsule Take 100 mg by  mouth 2 (Two) Times a Day As Needed for Constipation.   Taking   • FLUoxetine (PROzac) 10 MG capsule Take 10 mg by mouth Daily.   Taking   • folic acid (FOLVITE) 1 MG tablet Take 1 mg by mouth Daily.   Taking   • gabapentin (NEURONTIN) 400 MG capsule Take 400 mg by mouth 2 (Two) Times a Day.   Taking   • hydrALAZINE (APRESOLINE) 50 MG tablet Take 50 mg by mouth 3 (Three) Times a Day.   Taking   • HYDROcodone-acetaminophen (NORCO) 7.5-325 MG per tablet Take 1 tablet by mouth Every 6 (Six) Hours As Needed. for pain  0 Not Taking   • hydroxychloroquine (PLAQUENIL) 200 MG tablet Take 200 mg by mouth 2 (Two) Times a Day.      • insulin glargine (LANTUS) 100 UNIT/ML injection Inject 80 Units under the skin into the appropriate area as directed Every Morning.   Taking   • insulin lispro (HUMALOG) 100 UNIT/ML injection Inject 25 Units under the skin into the appropriate area as directed Daily Before Supper.   Taking   • insulin lispro (humaLOG) 100 UNIT/ML injection Inject 30 Units under the skin into the appropriate area as directed 2 (Two) Times a Day Before Meals. Breakfast and Lunch      • levothyroxine (SYNTHROID, LEVOTHROID) 50 MCG tablet Take 50 mcg by mouth Daily.   Taking   • Multiple Vitamins-Minerals (MULTI VITAMIN/MINERALS) tablet Take 1 tablet by mouth Daily.   Taking   • omeprazole (priLOSEC) 40 MG capsule Take 40 mg by mouth Daily.   Taking   • pravastatin (PRAVACHOL) 40 MG tablet Take 80 mg by mouth Daily.      • pyridostigmine (MESTINON) 60 MG tablet Take 30 mg by mouth 2 (Two) Times a Day.  3 Taking   • tamsulosin (FLOMAX) 0.4 MG capsule 24 hr capsule Take 1 capsule by mouth Daily.   Taking   • vitamin B-6 (PYRIDOXINE) 100 MG tablet Take 100 mg by mouth Daily.   Taking   • vitamin D (ERGOCALCIFEROL) 16644 units capsule capsule Take 50,000 Units by mouth Every 30 (Thirty) Days.   Taking       Allergies:  Patient has no known allergies.    Immunization History   Administered Date(s) Administered   • Fluzone  High Dose =>65 Years ([employer] ONLY) 09/27/2017   • Hepatitis A 08/21/2018   • Pneumococcal Conjugate 13-Valent (PCV13) 09/27/2017   • Pneumococcal, Unspecified 11/01/2011       REVIEW OF SYSTEMS:   CONSTITUTIONAL: The patient denies fevers, chills, sweats and body ache.  HEENT: Denies blurry vision, eye pain. Endorses recurrent headaches.  RESPIRATORY: Denies cough, sputum, hemoptysis.  CARDIAC: Denies chest pain, pressure, palpitations, irregular heartbeats. Denies lower extremity edema.  GASTROINTESTINAL: Denies abdominal pain.  NEUROLOGIC: Endorses history of headaches, dizziness, syncope.  MUSCULOSKELETAL: Positive for arthritis with joint stiffness in the morning. Limitation in range of motion.    Vital Signs  Temp:  [98 °F (36.7 °C)-98.8 °F (37.1 °C)] 98.1 °F (36.7 °C)  Heart Rate:  [] 80  Resp:  [16-22] 16  BP: ()/(59-81) 95/59         Results Review:      Lab Results (most recent)     Procedure Component Value Units Date/Time    Sedimentation Rate [[id number]]  (Abnormal) Collected:  02/03/20 0655    Specimen:  Blood Updated:  02/03/20 0759     Sed Rate 46 mm/hr     POC Glucose Once [[id number]]  (Abnormal) Collected:  02/02/20 2017    Specimen:  Blood Updated:  02/02/20 2018     Glucose 144 mg/dL      Comment: Serial Number: [id number]:  [id number]       Extra Tubes [[id number]] Collected:  02/02/20 1828    Specimen:  Blood, Venous Line Updated:  02/02/20 1930    Narrative:       The following orders were created for panel order Extra Tubes.  Procedure                               Abnormality         Status                     ---------                               -----------         ------                     Gold Top - SST[[id number]]                                   Final result                 Please view results for these tests on the individual orders.    Gold Top - SST [[id number]] Collected:  02/02/20 1828    Specimen:  Blood Updated:  02/02/20 1930     Extra Tube Hold for add-ons.        Comment: Auto resulted.       Troponin [[id number]]  (Abnormal) Collected:  02/02/20 1828    Specimen:  Blood Updated:  02/02/20 1912     Troponin T 0.036 ng/mL     Narrative:       Troponin T Reference Range:  <= 0.03 ng/mL-   Negative for AMI  >0.03 ng/mL-     Abnormal for myocardial necrosis.  Clinicians would have to utilize clinical acumen, EKG, Troponin and serial changes to determine if it is an Acute Myocardial Infarction or myocardial injury due to an underlying chronic condition.       Results may be falsely decreased if patient taking Biotin.      POC Glucose Once [[id number]]  (Abnormal) Collected:  02/02/20 1756    Specimen:  Blood Updated:  02/02/20 1757     Glucose 178 mg/dL      Comment: Serial Number: [id number]:  [id number]       C-reactive Protein [[id number]]  (Abnormal) Collected:  02/02/20 1503    Specimen:  Blood Updated:  02/02/20 1556     C-Reactive Protein 2.64 mg/dL     Troponin [[id number]]  (Normal) Collected:  02/02/20 0859    Specimen:  Blood Updated:  02/02/20 0944     Troponin T 0.030 ng/mL     Narrative:       Troponin T Reference Range:  <= 0.03 ng/mL-   Negative for AMI  >0.03 ng/mL-     Abnormal for myocardial necrosis.  Clinicians would have to utilize clinical acumen, EKG, Troponin and serial changes to determine if it is an Acute Myocardial Infarction or myocardial injury due to an underlying chronic condition.       Results may be falsely decreased if patient taking Biotin.      Basic Metabolic Panel [[id number]]  (Abnormal) Collected:  02/02/20 0440    Specimen:  Blood Updated:  02/02/20 0530     Glucose 258 mg/dL      BUN 47 mg/dL      Creatinine 2.42 mg/dL      Sodium 132 mmol/L      Potassium 3.2 mmol/L      Chloride 88 mmol/L      CO2 31.0 mmol/L      Calcium 9.5 mg/dL      eGFR Non African Amer 26 mL/min/1.73      BUN/Creatinine Ratio 19.4     Anion Gap 13.0 mmol/L     Narrative:       GFR Normal >60  Chronic Kidney Disease <60  Kidney Failure <15      TSH  [[id number]]  (Normal) Collected:  02/02/20 0440    Specimen:  Blood Updated:  02/02/20 0524     TSH 1.840 uIU/mL     CBC Auto Differential [[id number]]  (Abnormal) Collected:  02/02/20 0440    Specimen:  Blood Updated:  02/02/20 0458     WBC 7.70 10*3/mm3      RBC 3.88 10*6/mm3      Hemoglobin 11.6 g/dL      Hematocrit 34.7 %      MCV 89.3 fL      MCH 29.9 pg      MCHC 33.5 g/dL      RDW 14.9 %      RDW-SD 46.8 fl      MPV 7.3 fL      Platelets 156 10*3/mm3      Neutrophil % 73.8 %      Lymphocyte % 17.6 %      Monocyte % 6.0 %      Eosinophil % 2.0 %      Basophil % 0.6 %      Neutrophils, Absolute 5.70 10*3/mm3      Lymphocytes, Absolute 1.40 10*3/mm3      Monocytes, Absolute 0.50 10*3/mm3      Eosinophils, Absolute 0.20 10*3/mm3      Basophils, Absolute 0.00 10*3/mm3      nRBC 0.1 /100 WBC     Urinalysis With Culture If Indicated - Urine, Clean Catch [[id number]]  (Abnormal) Collected:  02/02/20 0303    Specimen:  Urine, Clean Catch Updated:  02/02/20 0313     Color, UA Yellow     Appearance, UA Clear     pH, UA 6.0     Specific Gravity, UA 1.009     Glucose,  mg/dL (Trace)     Ketones, UA Negative     Bilirubin, UA Negative     Blood, UA Negative     Protein, UA Negative     Leuk Esterase, UA Negative     Nitrite, UA Negative     Urobilinogen, UA 0.2 E.U./dL    Narrative:       Urine microscopic not indicated.    Basic Metabolic Panel [[id number]]  (Abnormal) Collected:  02/02/20 0121    Specimen:  Blood Updated:  02/02/20 0148     Glucose 144 mg/dL      BUN 48 mg/dL      Creatinine 2.64 mg/dL      Sodium 137 mmol/L      Potassium 3.0 mmol/L      Chloride 89 mmol/L      CO2 34.0 mmol/L      Calcium 10.0 mg/dL      eGFR Non African Amer 24 mL/min/1.73      BUN/Creatinine Ratio 18.2     Anion Gap 14.0 mmol/L     Narrative:       GFR Normal >60  Chronic Kidney Disease <60  Kidney Failure <15      Protime-INR [[id number]]  (Abnormal) Collected:  02/02/20 0121    Specimen:  Blood Updated:  02/02/20 0137     Breanna  11.7 Seconds      INR 1.14    aPTT [[id number]]  (Normal) Collected:  02/02/20 0121    Specimen:  Blood Updated:  02/02/20 0137     PTT 28.6 seconds     CBC & Differential [[id number]] Collected:  02/02/20 0121    Specimen:  Blood Updated:  02/02/20 0126    Narrative:       The following orders were created for panel order CBC & Differential.  Procedure                               Abnormality         Status                     ---------                               -----------         ------                     CBC Auto Differential[[id number]]        Abnormal            Final result                 Please view results for these tests on the individual orders.    CBC Auto Differential [[id number]]  (Abnormal) Collected:  02/02/20 0121    Specimen:  Blood Updated:  02/02/20 0126     WBC 6.10 10*3/mm3      RBC 4.19 10*6/mm3      Hemoglobin 12.3 g/dL      Hematocrit 36.8 %      MCV 87.7 fL      MCH 29.3 pg      MCHC 33.4 g/dL      RDW 14.9 %      RDW-SD 45.9 fl      MPV 7.0 fL      Platelets 185 10*3/mm3      Neutrophil % 66.5 %      Lymphocyte % 23.2 %      Monocyte % 6.2 %      Eosinophil % 3.3 %      Basophil % 0.8 %      Neutrophils, Absolute 4.00 10*3/mm3      Lymphocytes, Absolute 1.40 10*3/mm3      Monocytes, Absolute 0.40 10*3/mm3      Eosinophils, Absolute 0.20 10*3/mm3      Basophils, Absolute 0.10 10*3/mm3      nRBC 0.1 /100 WBC           Imaging Results (Most Recent)     Procedure Component Value Units Date/Time    XR Shoulder 2+ View Right [[id number]] Collected:  02/02/20 0932     Updated:  02/02/20 0936    Narrative:       DATE OF EXAM:  2/2/2020 1:25 AM     PROCEDURE:  XR SHOULDER 2+ VW RIGHT-     INDICATIONS:  Fall, right shoulder pain     COMPARISON:  No Comparisons Available     TECHNIQUE:   2 or more views of the right shoulder were obtained.     FINDINGS:  No definite acute fracture or malalignment is seen. There is osteophyte  formation with mild to moderate glenohumeral joint space  narrowing.  There appear to be moderate to severe degenerative changes at the  acromioclavicular joint with undersurface osteophytes. There is mild  enthesopathy at the greater tuberosity. Mineralization appears grossly  normal. Soft tissues appear unremarkable.       Impression:       1.No radiographic findings of acute osseous shoulder abnormality.  2.Moderate to severe, clavicular joint degeneration and mild to moderate  glenohumeral osteoarthritis.  3.Mild enthesopathy at the greater tuberosity, which could be associated  with rotator cuff pathology.     Electronically Signed By-DR. Oliver Patterson MD On:2/2/2020 9:34 AM  This report was finalized on [id number] by DR. Oliver Patterson MD.    XR Ribs Bilateral 4+ View With PA Chest [[id number]] Collected:  02/02/20 0929     Updated:  02/02/20 0934    Narrative:       DATE OF EXAM:  2/2/2020 3:00 AM     PROCEDURE:  XR RIBS BILATERAL 4+ VW W PA CHEST-     INDICATIONS:  Fall, bilateral rib pain     COMPARISON:  03/06/2018     TECHNIQUE:   A minimum of four radiologic views of the bilateral ribs with a PA chest  were obtained.        FINDINGS:  No focal or diffuse pulmonary infiltrate is seen. Heart size appears  mildly prominent, as before. Mediastinal contour appears grossly  unchanged with mild tortuosity of the thoracic aorta. There is a left  chest wall pacemaker with electrodes projecting in similar positions. No  pneumothorax or effusion is identified. Postoperative changes are seen  in the lower cervical spine. There are degenerative changes in the  thoracic spine. No definite acute displaced rib fracture is identified.  There are moderate degenerative changes at the bilateral glenohumeral  and acromioclavicular joints.        Impression:       1.No radiographic evidence of an acute displaced rib fracture.  A  nondisplaced fracture could be radiographically occult.  2.No radiographic findings of acute cardiopulmonary abnormality.     Electronically Signed  By-DR. Oliver Patterson MD On:2/2/2020 9:32 AM  This report was finalized on [id number] by DR. Oliver Patterson MD.    XR Hips Bilateral With or Without Pelvis 2 View [[id number]] Collected:  02/02/20 0925     Updated:  02/02/20 0934    Narrative:       DATE OF EXAM:  2/2/2020 3:00 AM     PROCEDURE:  XR HIPS BILATERAL W OR WO PELVIS 2 VIEW-     INDICATIONS:  Fall, pain in both hips. History of right hip arthroplasty.     COMPARISON:  04/22/2019     TECHNIQUE:   AP pelvis, AP hips, and frog-leg lateral hip views were obtained.     FINDINGS:  Status post right hip arthroplasty. There is heterotopic ossification at  the right hip. Hardware components appear in similar positions. There  appears to be chronic periosteal reaction at the lateral aspect of the  proximal right femur, unchanged compared to the prior exam. No definite  new hardware complication is identified.     There is osteophyte formation at the left hip with mild joint space  narrowing, as before. There is enthesophyte formation at the bilateral  trochanters and iliac wings initial tuberosities. No definite new  displaced fracture is seen. There are degenerative changes at the  sacroiliac joints and pubic symphysis. Soft tissues appear unremarkable.          Impression:       1.No definite radiographic evidence of acute osseous hip or pelvic  injury at this time.  2.Status post right hip arthroplasty with heterotopic ossification and  suspected chronic periosteal reaction along the proximal right femur, as  before.  3.Mild left hip osteoarthritis. Degenerative changes are also noted at  the sacroiliac joints and pubic symphysis.     Electronically Signed By-DR. Oliver Patterson MD On:2/2/2020 9:29 AM  This report was finalized on [id number] by DR. Oliver Patterson MD.    XR Ankle 3+ View Left [[id number]] Collected:  02/02/20 0907     Updated:  02/02/20 0911    Narrative:       DATE OF EXAM:  2/2/2020 1:25 AM     PROCEDURE:  XR ANKLE 3+ VW LEFT-      INDICATIONS:  Fall, left ankle pain.     COMPARISON:  No Comparisons Available     TECHNIQUE:   A minimum of three routine standard radiographic views were obtained of  the left ankle.     FINDINGS:  There appears to be diffuse soft tissue prominence of the visualized  lower leg and ankle.  There is mild osteophyte formation and joint space  narrowing at the tibiotalar joint. There is also suspected mild  degenerative changes at the subtalar and midfoot joints. Ankle mortise  alignment appears within normal limits. Osseous structures appear mildly  demineralized. There is mild enthesophyte formation at the Achilles  insertion. No definite displaced fracture is identified.       Impression:       1.No radiographic findings of acute osseous left ankle injury.  2.Nonspecific diffuse soft tissue edema.  3.Mild osteoarthritis of the tibiotalar, subtalar, and midfoot joints.  4.Osseous demineralization.     Electronically Signed By-DR. Oliver Patterson MD On:2/2/2020 9:09 AM  This report was finalized on [id number] by DR. Oliver Patterson MD.    XR Hand 3+ View Right [[id number]] Collected:  02/02/20 0906     Updated:  02/02/20 0909    Narrative:       DATE OF EXAM:  2/2/2020 1:25 AM     PROCEDURE:  XR HAND 3+ VW RIGHT-     INDICATIONS:  Fall, pain in the right third digit     COMPARISON:  07/22/2019     TECHNIQUE:   A minimum of three routine standard radiographic views were obtained of  the right hand.     FINDINGS:  Osseous structures are demineralized.  There is suspected mild to  moderate osteoarthritis of the interphalangeal joints and wrist. Osseous  alignment appears within normal limits. No displaced fracture is seen.  Soft tissues appear grossly unremarkable.        Impression:       1.No radiographic findings of acute osseous hand injury.  2.Mild to moderate osteoarthritis and osseous demineralization.     Electronically Signed By-DR. Oliver Patterson MD On:2/2/2020 9:07 AM  This report was finalized on  [id number] by DR. Oliver Patterson MD.    XR Knee 1 or 2 View Right [[id number]] Collected:  02/02/20 0844     Updated:  02/02/20 0848    Narrative:       DATE OF EXAM:  2/2/2020 3:00 AM     PROCEDURE:  XR KNEE 1 OR 2 VW RIGHT-     INDICATIONS:  Fall, right anterior knee pain     COMPARISON:  No Comparisons Available     TECHNIQUE:   One to two radiologic views of the right knee were obtained.        FINDINGS:  There is osteophytosis with mild tricompartmental joint space narrowing.  Mineralization appears within normal limits. There is enthesophyte  formation at the superior pole of the patella. No displaced fracture or  acute malalignment is identified. No definite knee effusion. There is  calcific atherosclerosis of the visualized lower extremity arteries. No  definite acute soft tissue abnormality is seen.        Impression:       1.No radiographic findings of acute osseous abnormality.  2.Mild tricompartmental osteoarthritis.     Electronically Signed By-DR. Oliver Patterson MD On:2/2/2020 8:45 AM  This report was finalized on [id number] by DR. Oliver Patterson MD.    CT Head Without Contrast [[id number]] Collected:  02/02/20 0036     Updated:  02/02/20 0247    Narrative:       EXAMINATION: CT HEAD AND CERVICAL SPINE WITHOUT CONTRAST    DATE: 2/2/2020 2:04 AM    INDICATION: Syncope, head trauma    COMPARISON:CT head and cervical spine 4/22/2019    TECHNIQUE: Noncontrast imaging obtained from the vertex to the skull base. Axial noncontrast imaging through the cervical spine followed by coronal and sagittal reformats.  CT dose lowering techniques were used, to include: automated exposure control,   adjustment for patient size, and or use of iterative reconstruction.?    FINDINGS:    CT head:    Soft Tissues: Mild right frontal scalp soft tissue swelling    Skull: No underlying skull fracture or radiopaque foreign body.    Sinuses: Paranasal sinuses are clear.    Mastoids: Mastoid air cells are clear.     Globes and  Orbits: Globes and orbits are intact.    Brain: No acute hemorrhage.  No midline shift, masses, or mass effect.  No evidence of acute infarct by noncontrast CT.    Ventricles and Cisterns: Ventricular size and configuration is within normal limits. Basal cisterns are patent. No abnormal extra-axial fluid collection.    Senescent Changes: Mild to moderate volume loss. Mild white matter hypoattenuation favoring chronic microvascular ischemic changes.      CT cervical spine:    Osseous:    Cervical lordosis is maintained.    Vertebral body height and alignment is preserved.    No acute fracture or subluxation.    No prevertebral soft tissue swelling. The lateral masses of C1 align on C2.      Degenerative Changes    Multilevel anterior bridging osteophytosis throughout the cervical spine.    C2-3:Mild facet arthropathy with mild bilateral foraminal narrowing.    C3-4:Mild to moderate facet arthropathy and uncovertebral hypertrophy. Severe bilateral foraminal stenosis. Disc osteophyte complex effaces the ventral subarachnoid space and flattens the ventral thecal sac. AP thecal sac diameter is approximately 4 mm.    C4-5:Mild to moderate facet arthropathy and uncovertebral hypertrophy. Moderate left and mild-to-moderate right foraminal narrowing. Disc osteophyte complex effaces the ventral subarachnoid space and contours the ventral thecal sac. AP thecal sac   diameter is approximately 8 mm.    C5-6:Mild facet arthropathy and uncovertebral hypertrophy. Moderate to severe bilateral foraminal stenosis. Disc osteophyte complex narrows the ventral subarachnoid space.    C6-7:Mild facet arthropathy and uncovertebral hypertrophy. Severe right and moderate left foraminal stenosis.    C7-T1:Mild facet arthropathy and uncovertebral hypertrophy. Mild foraminal narrowing. Disc osteophyte complex effaces the ventral subarachnoid space and flattens the ventral thecal sac. AP thecal sac diameter is approximately 6 mm.    Left  facet-spinous process screw fixation from C4 through C7.      Soft Tissues of the Neck:      No focal soft tissue abnormality within the visualized neck.    Visualized lung apices are clear. Visualized airways are patent.        Impression:         CT head:      1. Mild right frontal scalp soft tissue swelling.    2. No acute intracranial abnormality.    3. Mild volume loss and chronic microvascular ischemic changes.        CT cervical spine:      1. No acute fracture or traumatic subluxation.    2. Moderate multilevel degenerative disc disease and facet arthropathy which is most pronounced at C3-4 with moderate to severe canal stenosis.            Jevon Kauffman M.D.   Neuroradiologist     [facility]  [email or web address]    Thank you for this referral. This exam was interpreted by a fellowship trained neuroradiologist with subspeciality training in Neuroradiology.      SLOT  68     Electronically signed by:  Jevon Kauffman M.D.    2/2/2020 12:46 AM    CT Cervical Spine Without Contrast [[id number]] Collected:  02/02/20 0036     Updated:  02/02/20 0247    Narrative:       EXAMINATION: CT HEAD AND CERVICAL SPINE WITHOUT CONTRAST    DATE: 2/2/2020 2:04 AM    INDICATION: Syncope, head trauma    COMPARISON:CT head and cervical spine 4/22/2019    TECHNIQUE: Noncontrast imaging obtained from the vertex to the skull base. Axial noncontrast imaging through the cervical spine followed by coronal and sagittal reformats.  CT dose lowering techniques were used, to include: automated exposure control,   adjustment for patient size, and or use of iterative reconstruction.?    FINDINGS:    CT head:    Soft Tissues: Mild right frontal scalp soft tissue swelling    Skull: No underlying skull fracture or radiopaque foreign body.    Sinuses: Paranasal sinuses are clear.    Mastoids: Mastoid air cells are clear.     Globes and Orbits: Globes and orbits are intact.    Brain: No acute hemorrhage.  No midline shift, masses, or  mass effect.  No evidence of acute infarct by noncontrast CT.    Ventricles and Cisterns: Ventricular size and configuration is within normal limits. Basal cisterns are patent. No abnormal extra-axial fluid collection.    Senescent Changes: Mild to moderate volume loss. Mild white matter hypoattenuation favoring chronic microvascular ischemic changes.      CT cervical spine:    Osseous:    Cervical lordosis is maintained.    Vertebral body height and alignment is preserved.    No acute fracture or subluxation.    No prevertebral soft tissue swelling. The lateral masses of C1 align on C2.      Degenerative Changes    Multilevel anterior bridging osteophytosis throughout the cervical spine.    C2-3:Mild facet arthropathy with mild bilateral foraminal narrowing.    C3-4:Mild to moderate facet arthropathy and uncovertebral hypertrophy. Severe bilateral foraminal stenosis. Disc osteophyte complex effaces the ventral subarachnoid space and flattens the ventral thecal sac. AP thecal sac diameter is approximately 4 mm.    C4-5:Mild to moderate facet arthropathy and uncovertebral hypertrophy. Moderate left and mild-to-moderate right foraminal narrowing. Disc osteophyte complex effaces the ventral subarachnoid space and contours the ventral thecal sac. AP thecal sac   diameter is approximately 8 mm.    C5-6:Mild facet arthropathy and uncovertebral hypertrophy. Moderate to severe bilateral foraminal stenosis. Disc osteophyte complex narrows the ventral subarachnoid space.    C6-7:Mild facet arthropathy and uncovertebral hypertrophy. Severe right and moderate left foraminal stenosis.    C7-T1:Mild facet arthropathy and uncovertebral hypertrophy. Mild foraminal narrowing. Disc osteophyte complex effaces the ventral subarachnoid space and flattens the ventral thecal sac. AP thecal sac diameter is approximately 6 mm.    Left facet-spinous process screw fixation from C4 through C7.      Soft Tissues of the Neck:      No focal soft  tissue abnormality within the visualized neck.    Visualized lung apices are clear. Visualized airways are patent.        Impression:         CT head:      1. Mild right frontal scalp soft tissue swelling.    2. No acute intracranial abnormality.    3. Mild volume loss and chronic microvascular ischemic changes.        CT cervical spine:      1. No acute fracture or traumatic subluxation.    2. Moderate multilevel degenerative disc disease and facet arthropathy which is most pronounced at C3-4 with moderate to severe canal stenosis.            Jevon Kauffman M.D.   Neuroradiologist     [facility]  [email or web address]    Thank you for this referral. This exam was interpreted by a fellowship trained neuroradiologist with subspeciality training in Neuroradiology.      SLOT  68     Electronically signed by:  Jevon Kauffman M.D.    2/2/2020 12:46 AM            ECG/EMG Results (most recent)     Procedure Component Value Units Date/Time    ECG 12 Lead [[id number]] Collected:  02/02/20 0100     Updated:  02/02/20 0745    Narrative:       HEART RATE= 86  bpm  RR Interval= 696  ms  DE Interval= 187  ms  P Horizontal Axis= 223  deg  P Front Axis= -71  deg  QRSD Interval= 184  ms  QT Interval= 453  ms  QRS Axis= -58  deg  T Wave Axis= 112  deg  - ABNORMAL ECG -  Atrial-ventricular dual-paced rhythm  When compared with ECG of 22-Apr-2019 11:26:20,  No significant change  Electronically Signed By: Michael Bearden (Fabio) 02-Feb-2020 07:45:13  Date and Time of Study: 2020-02-02 01:00:08        Physical Exam   Constitutional: He is oriented to person, place, and time and well-developed, well-nourished, and in no distress.   HENT:   Head: Normocephalic.       Eyes: Conjunctivae are normal.   Neck: Neck supple. No tracheal deviation present.   Cardiovascular: Normal rate, regular rhythm and normal heart sounds. Exam reveals no gallop and no friction rub.   No murmur heard.  Pulmonary/Chest: Effort normal and breath sounds  normal. No respiratory distress. He has no wheezes. He has no rales.   Abdominal: Bowel sounds are normal. He exhibits no mass. There is tenderness. There is no rebound and no guarding.   Mild tenderness throughout   Lymphadenopathy:     He has no cervical adenopathy.   Neurological: He is alert and oriented to person, place, and time.   Skin: Skin is warm and dry.   Psychiatric: Affect normal.       Assessment/Plan     Active Hospital Problems    Diagnosis  POA   • Syncope and collapse [R55]  Yes      Resolved Hospital Problems   No resolved problems to display.       Syncope   The patient's history seems most consistent with orthostatic hypotension, due to the episodes occurring immediately after getting up, his history of similar episodes with subsequent work-ups yielding no diagnostic findings, and current medication regimen including tamsulosin and hydralazine. DDx currently includes arrhythmia vs orthostatic hypotension. Awaiting results of orthostatic blood pressures and device interrogation. Cardiology was consulted and agreed with current plan in addition to adding ESR and CRP out of concern for temporal arteritis.   His history of temporal headaches seems long-standing. ESR and CRP mildly elevated at 46 and 2.64, which are similar to previous levels and consistent with his rheumatoid arthritis diagnosis.   --Physical therapy consulted   --Hydralazine held      GERD  --Continue omeprazole     Hypothyroidism with history of thyroid cancer with thyroidectomy and parathyroid, thymene involvement  TSH within normal limits.   --Continue calictriol  --Continue mestinon  --Continue calcium  --Continue Synthroid     Arrhythmia with sick sinus syndrome   Awaiting device interrogation   --Continue Eliquis     Rheumatoid arthritis  Most likely the cause of his mild ESR and CRP elevation.    --Continue Plaquenil  --Continue Neurontin  --Continue folic acid  --Continue Norco     BPH   --Continue Flomax      Anxiety  --Continue Prozac  --Continue Valium     Hypertension   BP stable but low.   --Continue to hold hydralazine     Diabetes type II  --Continue Neurontin  --Continue at-home regimen of Lantus and mealtime insulin     HLD  --Continue pravastatin       Signed,  WARD EscobarS

## 2020-02-03 NOTE — CONSULTS
INITIAL CONSULT NOTE      Name: Zack Warner ADMIT: 2020   : 1945  PCP: Briana Elizabeth MD    MRN: [id number] LOS: 0 days   AGE/SEX: 74 y.o. male  ROOM: [facility]/       Reason for Consult:         Renal failure and chronic kidney disease  Subjective .     History of present illness:  Zack Warner is a 74 y.o. male who presents with syncopal episode.  Has past medical history of diabetes mellitus, hyperlipidemia, hypertension, paroxysmal atrial fibrillation, obstructive sleep apnea, history of pacemaker placement in the past, diastolic dysfunction.He was seen in  my office patient last time he was seen on .  His baseline creatinine is around 2.1.  He is on multiple diuretics.  his weight was around 282 when he was seen in my office last time.  Patient is still complaining of weakness not feeling good.  Although slightly better than admission.    Review of Systems    Constitutional: Weakness lethargy syncopal episode.  HEENT:  No headache, otalgia, itchy eyes, nasal discharge or sore throat.  Cardiac:  No chest pain, dyspnea, orthopnea or PND.  Chest:  No cough, phlegm or wheezing.  Abdomen:  No abdominal pain, nausea or vomiting.  Neuro:  No focal weakness, abnormal movements orseizure like activity.  :   No hematuria, no pyuria, no dysuria, no flank pain.  ROS was otherwise negative except as mentioned in the [place].     History  Past Medical History:   Diagnosis Date   • Chronic kidney disease    • Diabetes mellitus (CMS/HCC)    • Sjogren's syndrome (CMS/HCC)    ,   Past Surgical History:   Procedure Laterality Date   • BACK SURGERY     • CATARACT EXTRACTION, BILATERAL     • HIP SURGERY  2012    total hip arthroplasty Rt hip   • LAPAROSCOPIC CHOLECYSTECTOMY     • NECK SURGERY     • TOTAL THYROIDECTOMY     ,   Family History   Problem Relation Age of Onset   • Hypertension Mother    • Heart disease Mother    • Alzheimer's disease Father    • Heart disease Brother    • Hypertension  Daughter    • Diabetes Daughter    • Heart disease Brother    • Arthritis Brother    • No Known Problems Brother    • No Known Problems Brother    ,   Social History     Tobacco Use   • Smoking status: Former Smoker     Types: Cigars   • Smokeless tobacco: Former User     Types: Chew   Substance Use Topics   • Alcohol use: No     Frequency: Never   • Drug use: No    and Allergies:  Patient has no known allergies.    Objective     Vital Signs   Temp:  [98.1 °F (36.7 °C)-98.8 °F (37.1 °C)] 98.7 °F (37.1 °C)  Heart Rate:  [] 90  Resp:  [16-17] 17  BP: ()/(59-74) 105/65    Physical Exam:   General:      well developed, well nourished, in no acute distress.    Head:      normocephalic and atraumatic.    Eyes:      PERRL/EOM intact, conjunctiva and sclera clear with out nystagmus.    Neck:      no masses, thyromegaly,  trachea central with normal respiratory effort and PMI displaced laterally  Lungs:      clear bilaterally to auscultation.    Heart:       Lower rate and rhythm no murmur no gallop  Abdomen:       Soft, nontender, not distended, bowel sounds positive, no shifting dullness.  Msk:      no deformity or scoliosis noted of thoracic or lumbar spine.    Pulses:      pulses normal in all 4 extremities.    Extremities:       no cyanosis or clubbing--no significant edema.    Neurologic:      no focal deficits.   alert oriented x3  Skin:      intact without lesions or rashes.    Psych:      alert and cooperative; normal mood and affect; normal attention span and concentration.      LABS /Xray       CBC    Results from last 7 days   Lab Units 02/02/20  0440 02/02/20  0121   WBC 10*3/mm3 7.70 6.10   HEMOGLOBIN g/dL 11.6* 12.3*   PLATELETS 10*3/mm3 156 185     BMP   Results from last 7 days   Lab Units 02/02/20  0440 02/02/20  0121   SODIUM mmol/L 132* 137   POTASSIUM mmol/L 3.2* 3.0*   CHLORIDE mmol/L 88* 89*   CO2 mmol/L 31.0* 34.0*   BUN mg/dL 47* 48*   CREATININE mg/dL 2.42* 2.64*   GLUCOSE mg/dL 258*  144*     CMP   Results from last 7 days   Lab Units 02/02/20  0440 02/02/20  0121   SODIUM mmol/L 132* 137   POTASSIUM mmol/L 3.2* 3.0*   CHLORIDE mmol/L 88* 89*   CO2 mmol/L 31.0* 34.0*   BUN mg/dL 47* 48*   CREATININE mg/dL 2.42* 2.64*   GLUCOSE mg/dL 258* 144*     ABG      Imaging Results (Last 24 Hours)     ** No results found for the last 24 hours. 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  · If cardiac cath is needed patient will be given some IV fluid and Mucomyst  ·

## 2020-02-03 NOTE — PROGRESS NOTES
Discharge Planning Assessment  [facility]     Patient Name: Zack Warner  MRN: [id number]  Today's Date: 2/3/2020    Admit Date: 2/2/2020    Discharge Needs Assessment     Row Name 02/03/20 1532       Living Environment    Lives With  spouse;grandchild(antonio)    Current Living Arrangements  home/apartment/condo    Primary Care Provided by  self    Provides Primary Care For  no one    Family Caregiver if Needed  spouse    Quality of Family Relationships  helpful;involved    Able to Return to Prior Arrangements  yes       Resource/Environmental Concerns    Resource/Environmental Concerns  none    Transportation Concerns  car, none       Transition Planning    Patient/Family Anticipates Transition to  home with family    Patient/Family Anticipated Services at Transition  none    Transportation Anticipated  family or friend will provide       Discharge Needs Assessment    Readmission Within the Last 30 Days  no previous admission in last 30 days    Concerns to be Addressed  denies needs/concerns at this time;discharge planning    Equipment Currently Used at Home  walker, rolling    Anticipated Changes Related to Illness  none    Equipment Needed After Discharge  none    Provided post acute provider list?  Refused        Discharge Plan     Row Name 02/03/20 1533       Plan    Plan  Anticipate routine home    Patient/Family in Agreement with Plan  yes    Plan Comments  Met with patient at bedside. Lives at home with spouse and grandchild. PCP IJEOMA Elizabeth. No issues affording medications. Patient still drives. Currently denies any discharge needs or concerns at this time.           Expected Discharge Date and Time     Expected Discharge Date Expected Discharge Time    Feb 4, 2020         Demographic Summary     Row Name 02/03/20 1532       General Information    Admission Type  observation    Arrived From  emergency department    Required Notices Provided  Observation Status Notice    Referral Source  admission list     Reason for Consult  discharge planning    Preferred Language  English     Used During This Interaction  no        Functional Status     Row Name 02/03/20 1532       Functional Status    Usual Activity Tolerance  good    Current Activity Tolerance  good       Functional Status, IADL    Medications  independent    Meal Preparation  independent    Housekeeping  independent    Laundry  independent    Shopping  independent       Mental Status    General Appearance WDL  WDL       Mental Status Summary    Recent Changes in Mental Status/Cognitive Functioning  no changes          Patient Forms     Row Name 02/03/20 1535       Patient Forms    Important Message from Medicare (IMM)  -- Keith 2/2            Clarisa Knight RN

## 2020-02-04 PROBLEM — N28.9 RENAL INSUFFICIENCY: Status: RESOLVED | Noted: 2017-05-30 | Resolved: 2020-01-01

## 2020-02-04 PROBLEM — R20.0 NUMBNESS OF EXTREMITY: Status: RESOLVED | Noted: 2017-03-27 | Resolved: 2020-01-01

## 2020-02-04 PROBLEM — I50.9 CONGESTIVE HEART FAILURE (HCC): Status: RESOLVED | Noted: 2019-01-01 | Resolved: 2020-01-01

## 2020-02-04 PROBLEM — K64.8 BLEEDING INTERNAL HEMORRHOIDS: Status: RESOLVED | Noted: 2019-01-01 | Resolved: 2020-01-01

## 2020-02-04 PROBLEM — D50.0 ANEMIA DUE TO BLOOD LOSS: Status: RESOLVED | Noted: 2019-01-01 | Resolved: 2020-01-01

## 2020-02-04 PROBLEM — R53.1 ATTACKS OF WEAKNESS: Status: RESOLVED | Noted: 2019-01-01 | Resolved: 2020-01-01

## 2020-02-04 PROBLEM — I25.10 CORONARY ARTERIOSCLEROSIS: Status: RESOLVED | Noted: 2019-01-01 | Resolved: 2020-01-01

## 2020-02-04 PROBLEM — K62.5 RECTAL HEMORRHAGE: Status: RESOLVED | Noted: 2019-01-01 | Resolved: 2020-01-01

## 2020-02-04 NOTE — CONSULTS
Consult requested by Dr. Ceja and hospitalist    Indication for consultation is atrial fibrillation and syncope    Subject  74-year-old male patient with multiple medical problems had episodes of loss of consciousness with fall and injury which prompted admission to the hospital  During hospitalization patient was noted to have intermittent high ventricular rate with right ventricular pacing  Intermittent atrial arrhythmias were noted and a consultation was requested  Patient has multiple medical problems including history of myasthenia, chronic kidney disease, orthostatic hypotension, peripheral neuropathy and paroxysmal atrial fibrillation  Patient was on vasodilators including hydralazine which has been stopped  He also was noted to have electrolyte abnormalities including hypokalemia being managed by hospitalist and nephrologist  Denies any angina  Denies any recent TIA or stroke  Denies any epilepsy patient has Sick sinus syndrome with a [employer] pacemaker in situ      Multiple medical problems listed which are reviewed from reviewing history and physical and other consultations which is attached below  Abnormal cardiovascular stress test    • Acute bronchitis   • Anemia   • Atrial fibrillation (CMS/HCC)   • Carpal tunnel syndrome, bilateral   • Chest pain   • Chronic gout   • Renal insufficiency   • Coronary artery disease   • Cough   • Degenerative joint disease   • Inflammatory arthritis   • Sjogren's syndrome (CMS/HCC)   • Dermatitis   • Diabetic peripheral neuropathy (CMS/HCC)   • Edema   • Edema   • Encounter for immunization   • Encounter for other specified surgical aftercare   • Fever, unspecified   • Headache   • Neck pain   • Hyperlipidemia   • Hypertension   • Ingrown toenail   • Numbness of extremity   • Body mass index 45.0-49.9, adult (CMS/HCC)   • Obesity   • Osteoarthritis of hand   • Osteoarthritis of hip   • Osteoporosis   • Presence of cardiac pacemaker   • Shortness of breath   • Sick sinus  syndrome (CMS/HCC)   • Status post hip replacement   • Swelling of lower extremity   • Type 2 diabetes mellitus (CMS/HCC)   • Attacks of weakness   • Bleeding internal hemorrhoids   • Chronic obstructive lung disease (CMS/HCC)   • Gastroesophageal reflux disease with ulceration   • Irregular bowel habits   • Irritable bowel syndrome   • Rectal hemorrhage   • Anemia due to blood loss   • Coronary arteriosclerosis   • Diabetes mellitus (CMS/HCC)   • Chronic systolic heart failure (CMS/HCC)   • Congestive heart failure (CMS/HCC)   • Disorder associated with type 2 diabetes mellitus (CMS/HCC)   • Hyperparathyroidism (CMS/HCC)   • Chronic renal insufficiency, stage III (moderate) (CMS/HCC)   • Syncope and collapse         Past Medical History:   Diagnosis Date   • Chronic kidney disease    • Diabetes mellitus (CMS/HCC)    • Sjogren's syndrome (CMS/HCC)      Past Surgical History:   Procedure Laterality Date   • BACK SURGERY     • CATARACT EXTRACTION, BILATERAL     • HIP SURGERY  04/2012    total hip arthroplasty Rt hip   • LAPAROSCOPIC CHOLECYSTECTOMY     • NECK SURGERY     • TOTAL THYROIDECTOMY       Family History   Problem Relation Age of Onset   • Hypertension Mother    • Heart disease Mother    • Alzheimer's disease Father    • Heart disease Brother    • Hypertension Daughter    • Diabetes Daughter    • Heart disease Brother    • Arthritis Brother    • No Known Problems Brother    • No Known Problems Brother      Social History     Tobacco Use   • Smoking status: Former Smoker     Types: Cigars   • Smokeless tobacco: Former User     Types: Chew   Substance Use Topics   • Alcohol use: No     Frequency: Never   • Drug use: No     Medications Prior to Admission   Medication Sig Dispense Refill Last Dose   • albuterol sulfate HFA (PROAIR HFA) 108 (90 Base) MCG/ACT inhaler Inhale 2 puffs Every 4 (Four) Hours As Needed for Wheezing or Shortness of Air.   Taking   • apixaban (ELIQUIS) 5 MG tablet tablet Take 5 mg by mouth  Every 12 (Twelve) Hours.      • calcitriol (ROCALTROL) 0.25 MCG capsule Take 0.25 mcg by mouth Daily.   Taking   • Calcium Carbonate-Vit D-Min (CALCIUM 1200) 8993-2786 MG-UNIT chewable tablet Chew 1 tablet Daily.   Taking   • diazePAM (VALIUM) 5 MG tablet Take 5 mg by mouth Every 8 (Eight) Hours As Needed for Anxiety.   Taking   • docusate sodium ([employer] STOOL SOFTENER) 100 MG capsule Take 100 mg by mouth 2 (Two) Times a Day As Needed for Constipation.   Taking   • FLUoxetine (PROzac) 10 MG capsule Take 10 mg by mouth Daily.   Taking   • folic acid (FOLVITE) 1 MG tablet Take 1 mg by mouth Daily.   Taking   • gabapentin (NEURONTIN) 400 MG capsule Take 400 mg by mouth 2 (Two) Times a Day.   Taking   • hydrALAZINE (APRESOLINE) 50 MG tablet Take 50 mg by mouth 3 (Three) Times a Day.   Taking   • HYDROcodone-acetaminophen (NORCO) 7.5-325 MG per tablet Take 1 tablet by mouth Every 6 (Six) Hours As Needed. for pain  0 Not Taking   • hydroxychloroquine (PLAQUENIL) 200 MG tablet Take 200 mg by mouth 2 (Two) Times a Day.      • insulin lispro (humaLOG) 100 UNIT/ML injection Inject 30 Units under the skin into the appropriate area as directed 2 (Two) Times a Day Before Meals. Breakfast and Lunch      • levothyroxine (SYNTHROID, LEVOTHROID) 50 MCG tablet Take 50 mcg by mouth Daily.   Taking   • Multiple Vitamins-Minerals (MULTI VITAMIN/MINERALS) tablet Take 1 tablet by mouth Daily.   Taking   • omeprazole (priLOSEC) 40 MG capsule Take 40 mg by mouth Daily.   Taking   • pravastatin (PRAVACHOL) 40 MG tablet Take 80 mg by mouth Daily.      • pyridostigmine (MESTINON) 60 MG tablet Take 30 mg by mouth 2 (Two) Times a Day.  3 Taking   • tamsulosin (FLOMAX) 0.4 MG capsule 24 hr capsule Take 1 capsule by mouth Daily.   Taking   • vitamin B-6 (PYRIDOXINE) 100 MG tablet Take 100 mg by mouth Daily.   Taking   • vitamin D (ERGOCALCIFEROL) 85892 units capsule capsule Take 50,000 Units by mouth Every 30 (Thirty) Days.   Taking   •  [DISCONTINUED] insulin glargine (LANTUS) 100 UNIT/ML injection Inject 80 Units under the skin into the appropriate area as directed Every Morning.   Taking   • [DISCONTINUED] insulin lispro (HUMALOG) 100 UNIT/ML injection Inject 25 Units under the skin into the appropriate area as directed Daily Before Supper.   Taking     Allergies:  Patient has no known allergies.    Review of Systems   General:  positive for fatigue and tiredness  Eyes: No redness  Cardiovascular: No chest pain, no palpitations  Respiratory:   positive for class 2 shortness of breath  Gastrointestinal: No nausea or vomiting, bleeding  Genitourinary: no hematuria or dysuria  Musculoskeletal: No arthralgia or myalgia  Skin: No rash  Neurologic: No numbness, tingling, positive for syncope  Hematologic/Lymphatic: No abnormal bleeding      Physical Exam  VITALS REVIEWED--blood pressure is 110/70 pulse rate 70 patient afebrile respiration 12 times a minute    General:      well developed, well nourished, in no acute distress.    Head:      normocephalic and atraumatic.    Eyes:      PERRL/EOM intact, conjunctiva and sclera clear with out nystagmus.    Neck:      no masses, thyromegaly,  trachea central with normal respiratory effort and PMI non displaced   Lungs:      clear bilaterally to auscultation.    Heart:       underlying paced rhythm with muffled heart sounds  without any murmurs gallops or rubs  Msk:      no deformity or scoliosis noted of thoracic or lumbar spine.    Pulses:      pulses normal in all 4 extremities.    Extremities:       no cyanosis or clubbing--1+ bilateral edema noted     Neurologic:      no focal deficits.   alert oriented x3  Skin:      intact without lesions or rashes.    Psych:      alert and cooperative; normal mood and affect; normal attention span and concentration.          CBC    Results from last 7 days   Lab Units 02/04/20  0441 02/02/20  0440 02/02/20  0121   WBC 10*3/mm3 5.20 7.70 6.10   HEMOGLOBIN g/dL 11.9*  11.6* 12.3*   PLATELETS 10*3/mm3 161 156 185     BMP   Results from last 7 days   Lab Units 02/04/20 0441 02/03/20 1648 02/02/20 0440 02/02/20  0121   SODIUM mmol/L 134* 134* 132* 137   POTASSIUM mmol/L 3.0* 3.3* 3.2* 3.0*   CHLORIDE mmol/L 89* 87* 88* 89*   CO2 mmol/L 35.0* 32.0* 31.0* 34.0*   BUN mg/dL 45* 48* 47* 48*   CREATININE mg/dL 1.93* 2.16* 2.42* 2.64*   GLUCOSE mg/dL 229* 177* 258* 144*   MAGNESIUM mg/dL 2.0  --   --   --    PHOSPHORUS mg/dL 4.0  --   --   --      CMP   Results from last 7 days   Lab Units 02/04/20 0441 02/03/20 1648 02/02/20 0440 02/02/20  0121   SODIUM mmol/L 134* 134* 132* 137   POTASSIUM mmol/L 3.0* 3.3* 3.2* 3.0*   CHLORIDE mmol/L 89* 87* 88* 89*   CO2 mmol/L 35.0* 32.0* 31.0* 34.0*   BUN mg/dL 45* 48* 47* 48*   CREATININE mg/dL 1.93* 2.16* 2.42* 2.64*   GLUCOSE mg/dL 229* 177* 258* 144*   ALBUMIN g/dL 3.50  --   --   --    BILIRUBIN mg/dL 0.4  --   --   --    ALK PHOS U/L 40  --   --   --    AST (SGOT) U/L 17  --   --   --    ALT (SGPT) U/L 9  --   --   --      UA    Results from last 7 days   Lab Units 02/02/20  0303   NITRITE UA  Negative       EKG shows AV pacing  Intermittent atrial arrhythmia with tracking of ventricular rate at upper rate  Echo shows hyperdynamic LV function with paradoxical septal motion  Other imaging studies reviewed    Assessment and plan    Recurrent falls with syncope with injury--multifactorial in nature with presence of peripheral neuropathy, vasodilator treatment, intermittent atrial arrhythmias and prior history of myasthenia and poor conditioning  Atrial arrhythmias are also contributing to intermittent low blood pressure--however options are few in view of electrolyte abnormalities, myasthenia gravis, chronic kidney disease   avoid vasodilator treatment  Consider treating with low-dose of pindolol  If patient continues to have difficult to control arrhythmias the only other option is AF ablation which would be the last resort in this  patient  If he continues to have recurrent falls and injuries especially in view of prior history of anemia patient can be a potential candidate for watchman device--kindly note patient had significant injury with syncope at this time with a laceration of his right earlobe and injury of his right forehead  Chads vas score--5  HASBLED--4  Discussed extensively with the hospitalist--Dr. Werner and Dr. Ceja  Dual-chamber pacemaker in situ with normal function with intermittent rapid atrial arrhythmias with atrial fibrillation  Sick sinus syndrome  Chronic kidney disease  Nonobstructive carotid disease    Electronically signed by Sher Mcfarland MD, 02/04/20, 6:49 PM.

## 2020-02-04 NOTE — CONSULTS
"Diabetes Education  Assessment/Teaching    Patient Name:  Zack Warner  YOB: 1945  MRN: [id number]  Admit Date:  2/2/2020      Assessment Date:  2/4/2020    Most Recent Value   General Information    Referral From:  A1c [2/4/2020 A1c 10.1%]   Height  172.7 cm (68\")   Height Method  Stated   Weight  123 kg (271 lb 12.8 oz)   Weight Method  Standing scale   Pregnancy Assessment   Diabetes History   What type of diabetes do you have?  Type 2   Length of Diabetes Diagnosis  10 + years   Current DM knowledge  poor   Do you test your blood sugar at home?  yes   Frequency of checks  3-4 times/day   Meter type  One Touch meter   Who performs the test?  self   Have you had high blood sugar? (>140mg/dl)  yes   How often do you have high blood sugar?  frequently   When was your last high blood sugar?  pt states bs usually in the 200s.   How would you rate your diabetes control?  fair   Education Preferences   What areas of diabetes would you like to learn about?  avoiding high blood sugar, diabetes complications, medications for diabetes   Nutrition Information   Assessment Topics   Taking Medication - Assessment  Needs education   Problem Solving - Assessment  Needs education   Reducing Risk - Assessment  Needs education   Monitoring - Assessment  Needs education   DM Goals   Taking Medication - Goal  Today   Problem Solving - Goal  Today   Reducing Risk - Goal  Today   Monitoring - Goal  Today            Most Recent Value   DM Education Needs   Meter  Has own   Meter Type  One Touch   Frequency of Testing  4 times a day   Blood Glucose Target Range  Discussed A1c result of 10.1%. Reviewed how this relates to bs levels. Discussed healthy bs range and healthy A1c target. Discussed importance of bs control.   Medication  Insulin, Vial [Pt states he is supposed to take lantus 49 units am and at bedtime but he does not take if bs <150 mg/dl. Pt states checks bs and takes humalog per sliding scale but this is not " in relationship to mealtime.]   Problem Solving  Hyperglycemia, Hypoglycemia, Signs, Symptoms, Treatment   Reducing Risks  A1C testing   Motivation  Engaged   Teaching Method  Explanation, Discussion, Handouts   Patient Response  Verbalized understanding            Other Comments:  Discussed importance of always taking the lantus but discussed he may need a lower dosage of lantus if bs below a certain level. Encouraged pt to discuss this with PCP. Reviewed importance of taking humalog sliding scale premeals not after the meal. Pt states he may only eat 1-2 meals/day. Pt states he does see his PCP routinely, every 3 months. Discussed with pt how the endocrinologist has ordered his humalog in hospital (mealtime insulin plus sliding scale). Pt states his A1c is lower than in the past. He states his past reading was in the 12% range. Pt verbalized his understanding of the importance of always taking the lantus and taking humalog premeals. Pt states has the necessary insulin and bs monitoring supplies at home. Gave pt the A1c info sheet. Pt with no additional questions at this time.        Electronically signed by:  Haylee Boyd RN  02/04/20 5:10 PM

## 2020-02-04 NOTE — PROGRESS NOTES
Daily Progress Note    Patient Care Team:  Briana Elizabeth MD as PCP - General  Briana Elizabeth MD as PCP - Family Medicine  Briana Elizabeth MD as PCP - Claims Attributed    Chief Complaint: Follow-up type 2 diabetes    HPI: 74-year-old male with history of type 2 diabetes, hypertension, hyperlipidemia, CKD, permanent  pacemaker placement and obesity is admitted after he passed out.  His blood sugars were running up and down and an endocrine consultation was requested for evaluation.  I saw him yesterday reduced his insulin quite a bit but he did not get Lantus last night and has been refusing his mealtime insulin.  His blood sugars are running around 200+.  He is only getting sliding scale at this time.  He is eating well.  Denies any complaints at this time.    ROS:   Constitutional:  Denies fatigue, tiredness.    Eyes:  Denies change in visual acuity   HENT:  Denies nasal congestion or sore throat   Respiratory: denies cough, shortness of breath.   Cardiovascular:  denies chest pain, edema   GI:  Denies abdominal pain, nausea, vomiting.   :  Denies polyuria and polydipsia  Musculoskeletal:  Denies back pain or joint pain   Integument:  Denies rash   Neurologic:  Denies headache, focal weakness or sensory changes   Endocrine:  Denies polyuria or polydipsia   Psychiatric:  Denies depression or anxiety       Vitals:    02/04/20 1128   BP: 109/68   Pulse: 108   Resp: 16   Temp: 97.9 °F (36.6 °C)   SpO2: 97%       Physical Exam:  GEN: NAD, conversant  EYES: EOMI, PERRL, no conjunctival erythema  NECK: no thyromegaly, full ROM   CV: RRR, no murmurs/rubs/gallops, no peripheral edema  LUNG: CTAB, no wheezes/rales/ronchi  SKIN: no rashes, no acanthosis  MSK: no deformities, full ROM of all extremities  NEURO: no tremors, DTR normal  PSYCH: AOX3, appropriate mood, affect normal      Results Review:     I reviewed the patient's new clinical results.    Glucose   Date Value Ref Range Status   02/04/2020 229  (H) 65 - 99 mg/dL Final     Sodium   Date Value Ref Range Status   02/04/2020 134 (L) 136 - 145 mmol/L Final     Potassium   Date Value Ref Range Status   02/04/2020 3.0 (L) 3.5 - 5.2 mmol/L Final     CO2   Date Value Ref Range Status   02/04/2020 35.0 (H) 22.0 - 29.0 mmol/L Final     Chloride   Date Value Ref Range Status   02/04/2020 89 (L) 98 - 107 mmol/L Final     Anion Gap   Date Value Ref Range Status   02/04/2020 10.0 5.0 - 15.0 mmol/L Final     Creatinine   Date Value Ref Range Status   02/04/2020 1.93 (H) 0.76 - 1.27 mg/dL Final     BUN   Date Value Ref Range Status   02/04/2020 45 (H) 8 - 23 mg/dL Final     BUN/Creatinine Ratio   Date Value Ref Range Status   02/04/2020 23.3 7.0 - 25.0 Final     Calcium   Date Value Ref Range Status   02/04/2020 9.2 8.6 - 10.5 mg/dL Final     eGFR Non  Amer   Date Value Ref Range Status   02/04/2020 34 (L) >60 mL/min/1.73 Final     Alkaline Phosphatase   Date Value Ref Range Status   02/04/2020 40 39 - 117 U/L Final     Total Protein   Date Value Ref Range Status   02/04/2020 6.0 6.0 - 8.5 g/dL Final     ALT (SGPT)   Date Value Ref Range Status   02/04/2020 9 1 - 41 U/L Final     AST (SGOT)   Date Value Ref Range Status   02/04/2020 17 1 - 40 U/L Final     Total Bilirubin   Date Value Ref Range Status   02/04/2020 0.4 0.2 - 1.2 mg/dL Final     Albumin   Date Value Ref Range Status   02/04/2020 3.50 3.50 - 5.20 g/dL Final     Globulin   Date Value Ref Range Status   02/04/2020 2.5 gm/dL Final     Magnesium   Date Value Ref Range Status   02/04/2020 2.0 1.6 - 2.4 mg/dL Final     Phosphorus   Date Value Ref Range Status   02/04/2020 4.0 2.5 - 4.5 mg/dL Final     Lab Results   Component Value Date    HGBA1C 10.1 (H) 02/04/2020     No results found for: GLUF, MICROALBUR  Results from last 7 days   Lab Units 02/04/20  1127 02/04/20  0725 02/03/20  2142 02/03/20  1931 02/03/20  1637 02/03/20  1158   GLUCOSE mg/dL 282* 231* 213* 232* 177* 236*             Medication Review:  Reviewed.       apixaban 5 mg Oral Q12H   FLUoxetine 10 mg Oral Daily   folic acid 1 mg Oral Daily   gabapentin 400 mg Oral BID   insulin glargine 36 Units Subcutaneous Nightly   insulin lispro 0-12 Units Subcutaneous TID With Meals   insulin lispro 12 Units Subcutaneous TID With Meals   levothyroxine 50 mcg Oral Q AM   pantoprazole 40 mg Oral QAM   sodium chloride 10 mL Intravenous Q12H   tamsulosin 0.4 mg Oral Nightly         Assessment/Plan   1.  Diabetes mellitus type 2: Uncontrolled  2.  Hypertension: Well-controlled  3.    Hypothyroidism: On levothyroxine supplementation  4.  Syncope  5.  CKD  6.  Status post pacemaker placement     Plan:    I discussed with the patient and the family the importance of taking insulin scheduled dose in order to avoid significant fluctuations in the blood sugars.  At this time I will reduce his Lantus to 30 units at night along with Humalog 10 units with each meal.  We will continue Humalog sliding scale with meals.  We will continue to follow blood sugars and make further adjustments.             Lin Werner MD. FACE

## 2020-02-04 NOTE — PROGRESS NOTES
PROGRESS NOTE      Name: Zack Warner ADMIT: 2020   : 1945  PCP: Briana Elizabeth MD    MRN: [id number] LOS: 0 days   AGE/SEX: 74 y.o. male  ROOM: [facility]       INTERVAL History        Patient is more alert than yesterday shortness of breath is stable, dizziness is better weakness is better        Medications Prior to Admission   Medication Sig Dispense Refill Last Dose   • albuterol sulfate HFA (PROAIR HFA) 108 (90 Base) MCG/ACT inhaler Inhale 2 puffs Every 4 (Four) Hours As Needed for Wheezing or Shortness of Air.   Taking   • apixaban (ELIQUIS) 5 MG tablet tablet Take 5 mg by mouth Every 12 (Twelve) Hours.      • calcitriol (ROCALTROL) 0.25 MCG capsule Take 0.25 mcg by mouth Daily.   Taking   • Calcium Carbonate-Vit D-Min (CALCIUM 1200) 1619-6949 MG-UNIT chewable tablet Chew 1 tablet Daily.   Taking   • diazePAM (VALIUM) 5 MG tablet Take 5 mg by mouth Every 8 (Eight) Hours As Needed for Anxiety.   Taking   • docusate sodium ([employer] STOOL SOFTENER) 100 MG capsule Take 100 mg by mouth 2 (Two) Times a Day As Needed for Constipation.   Taking   • FLUoxetine (PROzac) 10 MG capsule Take 10 mg by mouth Daily.   Taking   • folic acid (FOLVITE) 1 MG tablet Take 1 mg by mouth Daily.   Taking   • gabapentin (NEURONTIN) 400 MG capsule Take 400 mg by mouth 2 (Two) Times a Day.   Taking   • hydrALAZINE (APRESOLINE) 50 MG tablet Take 50 mg by mouth 3 (Three) Times a Day.   Taking   • HYDROcodone-acetaminophen (NORCO) 7.5-325 MG per tablet Take 1 tablet by mouth Every 6 (Six) Hours As Needed. for pain  0 Not Taking   • hydroxychloroquine (PLAQUENIL) 200 MG tablet Take 200 mg by mouth 2 (Two) Times a Day.      • insulin glargine (LANTUS) 100 UNIT/ML injection Inject 80 Units under the skin into the appropriate area as directed Every Morning.   Taking   • insulin lispro (HUMALOG) 100 UNIT/ML injection Inject 25 Units under the skin into the appropriate area as directed Daily Before Supper.   Taking   • insulin lispro  "(humaLOG) 100 UNIT/ML injection Inject 30 Units under the skin into the appropriate area as directed 2 (Two) Times a Day Before Meals. Breakfast and Lunch      • levothyroxine (SYNTHROID, LEVOTHROID) 50 MCG tablet Take 50 mcg by mouth Daily.   Taking   • Multiple Vitamins-Minerals (MULTI VITAMIN/MINERALS) tablet Take 1 tablet by mouth Daily.   Taking   • omeprazole (priLOSEC) 40 MG capsule Take 40 mg by mouth Daily.   Taking   • pravastatin (PRAVACHOL) 40 MG tablet Take 80 mg by mouth Daily.      • pyridostigmine (MESTINON) 60 MG tablet Take 30 mg by mouth 2 (Two) Times a Day.  3 Taking   • tamsulosin (FLOMAX) 0.4 MG capsule 24 hr capsule Take 1 capsule by mouth Daily.   Taking   • vitamin B-6 (PYRIDOXINE) 100 MG tablet Take 100 mg by mouth Daily.   Taking   • vitamin D (ERGOCALCIFEROL) 13711 units capsule capsule Take 50,000 Units by mouth Every 30 (Thirty) Days.   Taking     Allergies:  Patient has no known allergies.  REVIEW OF SYSTEMS  All other review of system unremarkable    PHYSICAL EXAM  /64 (BP Location: Right arm, Patient Position: Lying)   Pulse 80   Temp 97.8 °F (36.6 °C) (Oral)   Resp 19   Ht 172.7 cm (68\")   Wt 123 kg (271 lb 12.8 oz)   SpO2 97%   BMI 41.33 kg/m²   Intake/Output last 3 shifts:  I/O last 3 completed shifts:  In: 1080 [P.O.:1080]  Out: 2700 [Urine:2700]  Intake/Output this shift:  I/O this shift:  In: 240 [P.O.:240]  Out: -     General:     Obese white male not any acute distress  Head:      normocephalic and atraumatic.    Eyes:      PERRL/EOM intact, conjunctiva and sclera clear with out nystagmus.    Neck:      no masses, thyromegaly,  trachea central with normal respiratory effort and PMI displaced laterally  Lungs:      clear bilaterally to auscultation.    Heart:       Regular rate and rhythm, no murmur no gallop  Abdomen:       Soft, nontender, not distended, bowel sounds positive, no shifting dullness.  Msk:      no deformity or scoliosis noted of thoracic or lumbar " spine.    Pulses:      pulses normal in all 4 extremities.    Extremities:       no cyanosis or clubbing--no 'significant edema.    Neurologic:      no focal deficits.   alert oriented x3  Skin:      intact without lesions or rashes.    Psych:      alert and cooperative; normal mood and affect; normal attention span and concentration.          CBC    Results from last 7 days   Lab Units 02/04/20 0441 02/02/20 0440 02/02/20  0121   WBC 10*3/mm3 5.20 7.70 6.10   HEMOGLOBIN g/dL 11.9* 11.6* 12.3*   PLATELETS 10*3/mm3 161 156 185     BMP   Results from last 7 days   Lab Units 02/04/20 0441 02/03/20 1648 02/02/20 0440 02/02/20  0121   SODIUM mmol/L 134* 134* 132* 137   POTASSIUM mmol/L 3.0* 3.3* 3.2* 3.0*   CHLORIDE mmol/L 89* 87* 88* 89*   CO2 mmol/L 35.0* 32.0* 31.0* 34.0*   BUN mg/dL 45* 48* 47* 48*   CREATININE mg/dL 1.93* 2.16* 2.42* 2.64*   GLUCOSE mg/dL 229* 177* 258* 144*   MAGNESIUM mg/dL 2.0  --   --   --    PHOSPHORUS mg/dL 4.0  --   --   --      CMP   Results from last 7 days   Lab Units 02/04/20 0441 02/03/20 1648 02/02/20 0440 02/02/20  0121   SODIUM mmol/L 134* 134* 132* 137   POTASSIUM mmol/L 3.0* 3.3* 3.2* 3.0*   CHLORIDE mmol/L 89* 87* 88* 89*   CO2 mmol/L 35.0* 32.0* 31.0* 34.0*   BUN mg/dL 45* 48* 47* 48*   CREATININE mg/dL 1.93* 2.16* 2.42* 2.64*   GLUCOSE mg/dL 229* 177* 258* 144*   ALBUMIN g/dL 3.50  --   --   --    BILIRUBIN mg/dL 0.4  --   --   --    ALK PHOS U/L 40  --   --   --    AST (SGOT) U/L 17  --   --   --    ALT (SGPT) U/L 9  --   --   --      ABG        @Assessment  Assessment:        Anemia    Atrial fibrillation (CMS/HCC)    Chronic gout    Renal insufficiency    Coronary artery disease    Degenerative joint disease    Inflammatory arthritis    Sjogren's syndrome (CMS/HCC)    Dermatitis    Diabetic peripheral neuropathy (CMS/HCC)    Hyperlipidemia    Hypertension    Presence of cardiac pacemaker    Syncope and collapse    · Acute kidney injury in a patient with CKD stage  III-IV  · History of congestive heart failure mainly diastolic dysfunction  · History of significant edema  · History of hypertension  · History of diabetes mellitus not well controlled  · Hyponatremia and hypokalemia  · History of respiratory acidosis  · History of atrial fibrillation     Plan:      · Follow-up with volume status closely  · Follow-up with repeat echocardiogram which is still pending  · No need to restart diuretics at this time   · Will restart low-dose loop diuretics by tomorrow as patient sodium is still on the low side   · If hemodynamically stable consider Aldactone and loop diuretics   · Will hold metolazone and will not start at the time of discharge  · follow-up with repeat electrolyte  · Replace potassium as per protocol  · As blood pressure is low holding diuretics for now  · No significant edema at this time  · Follow-up with cardiology if cardiac cath is needed may need some IV fluid no plan of cardiac cath at this time  · *

## 2020-02-04 NOTE — CONSULTS
Inpatient Endocrine Consult  Consultation requested by Dr. DIANE Werner for uncontrolled diabetes  Patient Care Team:  Briana Elizabeth MD as PCP - General  Briana Elizabeth MD as PCP - Family Medicine  Briana Elizabeth MD as PCP - Claims Attributed    Chief Complaint: Uncontrolled type 2 diabetes    HPI: 74-year-old male with history of type 2 diabetes for last 30+ years, hypertension, hyperlipidemia, CKD, permanent pacemaker placement in the past and obesity apparently admitted with syncope and loss of consciousness with trauma to the right ear.  Patient has been admitted for further evaluation and management.  His sugars been running high and an endocrine consultation is requested for further evaluation management.  Patient tells me that he takes Lantus 49 units twice a day at home and then Humalog sliding scale.  He tells me his blood sugar varies he does check blood sugars at home.  He is eating well at this time.  Denies any chest pain, shortness of breath, nausea, vomiting, fever, cough or any other significant complaints at this time.  He tells me that he follows with a diabetes specialist in [facility].    Past Medical History:   Diagnosis Date   • Chronic kidney disease    • Diabetes mellitus (CMS/HCC)    • Sjogren's syndrome (CMS/HCC)        Social History     Socioeconomic History   • Marital status:      Spouse name: Not on file   • Number of children: Not on file   • Years of education: Not on file   • Highest education level: Not on file   Tobacco Use   • Smoking status: Former Smoker     Types: Cigars   • Smokeless tobacco: Former User     Types: Chew   Substance and Sexual Activity   • Alcohol use: No     Frequency: Never   • Drug use: No   • Sexual activity: Defer       Family History   Problem Relation Age of Onset   • Hypertension Mother    • Heart disease Mother    • Alzheimer's disease Father    • Heart disease Brother    • Hypertension Daughter    • Diabetes Daughter    •  Heart disease Brother    • Arthritis Brother    • No Known Problems Brother    • No Known Problems Brother        No Known Allergies    ROS:   Constitutional:  Denies fatigue, tiredness.    Eyes:  Denies change in visual acuity   HENT:  Denies nasal congestion or sore throat   Respiratory: denies cough, shortness of breath.   Cardiovascular:  denies chest pain, edema   GI:  Denies abdominal pain, nausea, vomiting.   :  Denies Polyuria and Polydipsia  Musculoskeletal:  Denies back pain or joint pain   Integument:  Denies dry skin, rash   Neurologic:  Denies headache, focal weakness or sensory changes   Endocrine:  Denies polyuria or polydipsia   Psychiatric:  Denies depression or anxiety      Vitals:    02/03/20 1200   BP: 105/65   Pulse: 90   Resp: 17   Temp: 98.7 °F (37.1 °C)   SpO2: 95%        General Appearance:    Alert, cooperative, in no acute distress   Head:    Normocephalic, without obvious abnormality, atraumatic           Eyes:    Lids and lashes normal, conjunctivae and sclerae normal,       Throat:   No oral lesions,  oral mucosa moist. Edentulous   Neck:   No adenopathy, supple,  no thyromegaly, no   carotid bruit   Lungs:    Clear     Heart:    Regular rhythm and normal rate   Chest Wall:    No abnormalities observed   Abdomen:     Normal bowel sounds                Extremities:   Moves all extremities well, no edema               Pulses:   Pulses palpable and equal bilaterally   Skin:   Dry   Neurologic:  Psych:    No focal deficit.     AAOx3, appropriate mood, affect normal         Results Review:     I reviewed the patient's new clinical results.    Lab Results   Component Value Date    GLUCOSE 177 (H) 02/03/2020    BUN 48 (H) 02/03/2020    CREATININE 2.16 (H) 02/03/2020    EGFRIFNONA 30 (L) 02/03/2020    EGFRIFAFRI 59 (L) 03/29/2017    BCR 22.2 02/03/2020    K 3.3 (L) 02/03/2020    CO2 32.0 (H) 02/03/2020    CALCIUM 9.4 02/03/2020    PROTENTOTREF 5.8 (L) 07/22/2019    ALBUMIN 3.90 11/20/2019     LABIL2 1.1 07/22/2019    AST 12 11/20/2019    ALT 8 11/20/2019         Lab Results   Component Value Date    CREATININE 2.16 (H) 02/03/2020     Results from last 7 days   Lab Units 02/03/20  1637 02/03/20  1158 02/02/20 2017 02/02/20  1756 02/02/20  1524 02/02/20  1115   GLUCOSE mg/dL 177* 236* 144* 178* 348* 267*       Medication Review: Reviewed.       Current Facility-Administered Medications:   •  acetaminophen (TYLENOL) tablet 650 mg, 650 mg, Oral, Q4H PRN, 650 mg at 02/02/20 1819 **OR** acetaminophen (TYLENOL) 160 MG/5ML solution 650 mg, 650 mg, Oral, Q4H PRN **OR** acetaminophen (TYLENOL) suppository 650 mg, 650 mg, Rectal, Q4H PRN, Erin Parada FNP  •  albuterol (PROVENTIL) nebulizer solution 0.083% 2.5 mg/3mL, 2.5 mg, Nebulization, Q6H PRN, Joby Perez MD  •  apixaban (ELIQUIS) tablet 5 mg, 5 mg, Oral, Q12H, Joby Perez MD, 5 mg at 02/03/20 0907  •  dextrose (D50W) 25 g/ 50mL Intravenous Solution 25 g, 25 g, Intravenous, Q15 Min PRN, Joby Perez MD  •  dextrose (GLUTOSE) oral gel 15 g, 15 g, Oral, Q15 Min PRN, Joby Perez MD  •  diazePAM (VALIUM) tablet 5 mg, 5 mg, Oral, Q8H PRN, Joby Perez MD  •  FLUoxetine (PROzac) capsule 10 mg, 10 mg, Oral, Daily, Joby Perez MD, 10 mg at 02/03/20 0907  •  folic acid (FOLVITE) tablet 1 mg, 1 mg, Oral, Daily, Joby Perez MD, 1 mg at 02/03/20 0907  •  gabapentin (NEURONTIN) capsule 400 mg, 400 mg, Oral, BID, Joby Perez MD, 400 mg at 02/03/20 0907  •  glucagon (human recombinant) (GLUCAGEN DIAGNOSTIC) injection 1 mg, 1 mg, Subcutaneous, Q15 Min PRN, Joby Perez MD  •  HYDROcodone-acetaminophen (NORCO) 7.5-325 MG per tablet 1 tablet, 1 tablet, Oral, Q6H PRN, Joby Perez MD, 1 tablet at 02/03/20 1618  •  insulin glargine (LANTUS) injection 80 Units, 80 Units, Subcutaneous, QAM, Joby Perez MD  •  insulin lispro (humaLOG) injection 0-24 Units, 0-24 Units,  Subcutaneous, 4x Daily With Meals & Nightly, 4 Units at 02/03/20 1730 **AND** insulin lispro (humaLOG) injection 0-24 Units, 0-24 Units, Subcutaneous, PRN, Joby Perez MD  •  insulin lispro (humaLOG) injection 25 Units, 25 Units, Subcutaneous, Daily Before Supper, Joby Perez MD, 25 Units at 02/02/20 1554  •  insulin lispro (humaLOG) injection 30 Units, 30 Units, Subcutaneous, Daily With Breakfast & Lunch, Joby Perez MD  •  levothyroxine (SYNTHROID, LEVOTHROID) tablet 50 mcg, 50 mcg, Oral, Q AM, Joby Perez MD, 50 mcg at 02/03/20 0518  •  ondansetron (ZOFRAN) tablet 4 mg, 4 mg, Oral, Q6H PRN **OR** ondansetron (ZOFRAN) injection 4 mg, 4 mg, Intravenous, Q6H PRN, Erin Parada FNP  •  pantoprazole (PROTONIX) EC tablet 40 mg, 40 mg, Oral, QAM, Joby Perez MD, 40 mg at 02/03/20 0606  •  [COMPLETED] Insert peripheral IV, , , Once **AND** sodium chloride 0.9 % flush 10 mL, 10 mL, Intravenous, PRN, Zoe Sarabia NP  •  sodium chloride 0.9 % flush 10 mL, 10 mL, Intravenous, Q12H, Erin Parada FNP, 10 mL at 02/03/20 0911  •  sodium chloride 0.9 % flush 10 mL, 10 mL, Intravenous, PRN, Erin Parada FNP  •  tamsulosin (FLOMAX) 24 hr capsule 0.4 mg, 0.4 mg, Oral, Nightly, Joby Perez MD, 0.4 mg at 02/02/20 2101    Assessment/Plan   1.  Diabetes mellitus type 2: Uncontrolled  2.  Hypertension  3.  Hyperlipidemia  4.  Syncope  5.  CKD  6.  Status post pacemaker placement    Plan:  At this time I will change Lantus to 36 units subcu nightly and change Humalog to 12 units with each meal along with Humalog sliding scale.  Will check A1c.    Thank you very much for the consultation.             Lin Werner MD FACE.

## 2020-02-04 NOTE — PLAN OF CARE
Problem: Patient Care Overview  Goal: Plan of Care Review  Outcome: Ongoing (interventions implemented as appropriate)  Flowsheets (Taken 2/4/2020 0324)  Progress: no change  Plan of Care Reviewed With: patient  Outcome Summary: Pt resting comfortably in bed. Breathing even and unlabored. Pt has c/o chronic back pain and requesting consistently q6h as ordered; states relief when administered. Will continue to monitor,.

## 2020-02-04 NOTE — DISCHARGE SUMMARY
[facility]  DISCHARGE SUMMARY        Prepared For PCP:  Briana Elizabeth MD    Patient Name: Zack Warner  : 1945  MRN: [id number]      Date of Admission:   2020    Date of Discharge:  2020    Length of stay:  LOS: 0 days     Hospital Course     Presenting Problem:   Syncope and collapse [R55]  Hypokalemia [E87.6]  Hypochloremia [E87.8]  Multiple contusions [T07.XXXA]  Injury of head, initial encounter [S09.90XA]  Laceration of right ear lobe, initial encounter [S01.311A]      Active Hospital Problems    Diagnosis  POA   • **Syncope and collapse [R55]  Yes     Priority: High   • Chronic systolic heart failure (CMS/HCC) [I50.22]  Yes     Priority: Medium   • Chronic obstructive lung disease (CMS/HCC) [J44.9]  Yes     Priority: Medium   • Gastroesophageal reflux disease with ulceration [K21.9]  Yes     Priority: Medium   • Irregular bowel habits [R19.8]  Yes     Priority: Medium   • Irritable bowel syndrome [K58.9]  Yes     Priority: Medium   • Hyperparathyroidism (CMS/HCC) [E21.3]  Yes     Priority: Medium   • Chronic renal insufficiency, stage III (moderate) (CMS/HCC) [N18.3]  Yes     Priority: Medium   • Coronary artery disease [I25.10]  Yes     Priority: Medium   • Hypertension [I10]  Yes     Priority: Medium   • Anemia [D64.9]  Yes     Priority: Medium   • Chronic gout [M1A.9XX0]  Yes     Priority: Medium   • Osteoporosis [M81.0]  Yes     Priority: Medium   • Presence of cardiac pacemaker [Z95.0]  Yes     Priority: Medium   • Sick sinus syndrome (CMS/HCC) [I49.5]  Yes     Priority: Medium   • Inflammatory arthritis [M19.90]  Yes     Priority: Medium   • Sjogren's syndrome (CMS/HCC) [M35.00]  Yes     Priority: Medium   • Dermatitis [L30.9]  Yes     Priority: Medium   • Body mass index 45.0-49.9, adult (CMS/HCC) [Z68.42]  Not Applicable     Priority: Medium   • Atrial fibrillation (CMS/HCC) [I48.91]  Yes     Priority: Medium   • Diabetic peripheral  neuropathy (CMS/[facility]) [E11.42]  Yes     Priority: Medium   • Degenerative joint disease [M19.90]  Yes     Priority: Medium   • Hyperlipidemia [E78.5]  Yes     Priority: Medium   • Obesity [E66.9]  Yes     Priority: Medium   • Type 2 diabetes mellitus (CMS/HCC) [E11.9]  Yes     Priority: Medium      Resolved Hospital Problems   No resolved problems to display.           Hospital Course:    Mr. Warner is a 74 y.o.  presents to [facility] complaining of syncope with fall and collapse           74-year-old male presents to the ER with a chief complaint of syncope with loss of consciousness and injury to the right ear when hitting something in the bathroom.  The patient states he got up to go to the bathroom and the next thing he knew he was being awakened by his wife on the bathroom floor.  Patient had a similar episode of loss of consciousness in October 2019 and despite hospitalization x3 days the cause was not identified.  The patient does have a history of sick sinus syndrome with pacemaker which was interrogated at that time without identification of significant abnormality.  The patient also had neurologic work-up with CT and MRI of the brain at that time which did not reveal definitive cause of syncopal episode.  Patient states he felt well earlier in the day without any recent subjective fever or chills, increased cough, chest pain or other bothersome symptoms.     Syncope with unknown etiology  -Patient may have orthostatic hypotension could be because of syncope as patient is on hydralazine .  Patient had echocardiogram done which came out to be negative.  Patient was seen by cardiology and EP cardiology.  Patient was advised to avoid hydralazine as well as I start Mestinon.  We suspect patient may have orthostatic hypotension.  She will be sent home on pindolol for intermittent A. fib as well as he will follow-up with EP cardiology as an outpatient.    Acute kidney injury with chronic kidney disease  stage III and nephrology has been consulted.  Avoid nephrotoxic medications.     Atrial fibrillation status post pacemaker placement and patient is on long-term anticoagulation     Rheumatoid arthritis/Sjogren syndrome and patient has history of several musculoskeletal complaints as well as inflammatory arthritis.     Diabetes mellitus type 2 on insulin and check Accu-Cheks and use sliding scale insulin  -Patient was seen by endocrine and his insulin was adjusted and he will need further management of diabetes mellitus as aggressively as outpatient     GERD on PPI continue         Recommendation for Outpatient Providers:             Reasons For Change In Medications and Indications for New Medications:        Day of Discharge     HPI:       Vital Signs:   Temp:  [97.4 °F (36.3 °C)-97.9 °F (36.6 °C)] 97.9 °F (36.6 °C)  Heart Rate:  [] 108  Resp:  [16-19] 16  BP: (100-124)/(64-69) 109/68     Physical Exam:  Physical Exam     Physical Exam:    Vitals and Nursing notes reveiwed.     General Appearance: Alert, cooperative, in no acute distress    Head:    Normocephalic, without obvious abnormality, atraumatic    Eyes:           Lids and lashes normal, conjunctivae and sclerae normal, no   icterus, no pallor, corneas clear, PERRLA    Ears:    Ears appear intact with no abnormalities noted    Throat:   No oral lesions, no thrush, oral mucosa moist    Neck:   No adenopathy, supple, trachea midline, no thyromegaly, no carotid bruit, no JVD    Back:     No kyphosis present, no scoliosis present, no skin lesions, erythema or scars, no tenderness to percussion or palpation, range of motion normal    Lungs:     Clear to auscultation,respirations regular, even and unlabored    Heart:   Regular rhythm and normal rate, normal S1 and S2, no            murmur, no gallop, no rub, no click    Breast Exam  Deferred    Abdomen: Normal bowel sounds, no masses, no organomegaly, soft  non-tender, non-distended, no guarding, no rebound  tenderness    Genitalia:    Deferred    Extremities: Moves all extremities well, no edema, no cyanosis, no redness    Pulses:   Pulses palpable and equal bilaterally    Skin:   No bleeding, bruising or rash    Lymph nodes: No palpable adenopathy    Neurologic:   Cranial nerves 2 - 12 grossly intact, sensation intact, DTR  present and equal bilaterally      Pertinent  and/or Most Recent Results     Results from last 7 days   Lab Units 02/04/20 0441 02/03/20  1648 02/02/20 0440 02/02/20  0121   WBC 10*3/mm3 5.20  --  7.70 6.10   HEMOGLOBIN g/dL 11.9*  --  11.6* 12.3*   HEMATOCRIT % 35.3*  --  34.7* 36.8*   PLATELETS 10*3/mm3 161  --  156 185   SODIUM mmol/L 134* 134* 132* 137   POTASSIUM mmol/L 3.0* 3.3* 3.2* 3.0*   CHLORIDE mmol/L 89* 87* 88* 89*   CO2 mmol/L 35.0* 32.0* 31.0* 34.0*   BUN mg/dL 45* 48* 47* 48*   CREATININE mg/dL 1.93* 2.16* 2.42* 2.64*   GLUCOSE mg/dL 229* 177* 258* 144*   CALCIUM mg/dL 9.2 9.4 9.5 10.0     Results from last 7 days   Lab Units 02/04/20 0441 02/02/20  0121   BILIRUBIN mg/dL 0.4  --    ALK PHOS U/L 40  --    ALT (SGPT) U/L 9  --    AST (SGOT) U/L 17  --    PROTIME Seconds  --  11.7   INR   --  1.14*   APTT seconds  --  28.6     Results from last 7 days   Lab Units 02/04/20  0441   CHOLESTEROL mg/dL 150   TRIGLYCERIDES mg/dL 107   HDL CHOL mg/dL 51     Results from last 7 days   Lab Units 02/04/20 0441 02/02/20  1828 02/02/20  0859 02/02/20  0440 02/02/20  0121   TSH uIU/mL 3.830  --   --  1.840  --    HEMOGLOBIN A1C % 10.1*  --   --   --   --    PROBNP pg/mL 469.7  --   --   --   --    TROPONIN T ng/mL 0.029 0.036* 0.030 0.030 0.048*       Brief Urine Lab Results  (Last result in the past 365 days)      Color   Clarity   Blood   Leuk Est   Nitrite   Protein   CREAT   Urine HCG        02/02/20 0303 Yellow Clear Negative Negative Negative Negative               Microbiology Results Abnormal     None          Xr Ribs Bilateral 4+ View With Pa Chest    Result Date:  2/2/2020  Impression: 1.No radiographic evidence of an acute displaced rib fracture.  A nondisplaced fracture could be radiographically occult. 2.No radiographic findings of acute cardiopulmonary abnormality.  Electronically Signed By-DR. Oliver Patterson MD On:2/2/2020 9:32 AM This report was finalized on [id number] by DR. Oliver Patterson MD.    Xr Shoulder 2+ View Right    Result Date: 2/2/2020  Impression: 1.No radiographic findings of acute osseous shoulder abnormality. 2.Moderate to severe, clavicular joint degeneration and mild to moderate glenohumeral osteoarthritis. 3.Mild enthesopathy at the greater tuberosity, which could be associated with rotator cuff pathology.  Electronically Signed By-DR. Oliver Patterson MD On:2/2/2020 9:34 AM This report was finalized on [id number] by DR. Oliver Patterson MD.    Xr Hand 3+ View Right    Result Date: 2/2/2020  Impression: 1.No radiographic findings of acute osseous hand injury. 2.Mild to moderate osteoarthritis and osseous demineralization.  Electronically Signed By-DR. Oliver Patterson MD On:2/2/2020 9:07 AM This report was finalized on [id number] by DR. Oliver Patterson MD.    Xr Knee 1 Or 2 View Right    Result Date: 2/2/2020  Impression: 1.No radiographic findings of acute osseous abnormality. 2.Mild tricompartmental osteoarthritis.  Electronically Signed By-DR. Oliver Patterson MD On:2/2/2020 8:45 AM This report was finalized on [id number] by DR. Oliver Patterson MD.    Xr Ankle 3+ View Left    Result Date: 2/2/2020  Impression: 1.No radiographic findings of acute osseous left ankle injury. 2.Nonspecific diffuse soft tissue edema. 3.Mild osteoarthritis of the tibiotalar, subtalar, and midfoot joints. 4.Osseous demineralization.  Electronically Signed By-DR. Oliver Patterson MD On:2/2/2020 9:09 AM This report was finalized on [id number] by DR. Oliver Patterson MD.    Ct Head Without Contrast    Result Date: 2/2/2020  Impression: CT head: 1. Mild right frontal scalp soft  tissue swelling. 2. No acute intracranial abnormality. 3. Mild volume loss and chronic microvascular ischemic changes. CT cervical spine: 1. No acute fracture or traumatic subluxation. 2. Moderate multilevel degenerative disc disease and facet arthropathy which is most pronounced at C3-4 with moderate to severe canal stenosis. Jevon Kauffman M.D. Neuroradiologist [email or web address] Thank you for this referral. This exam was interpreted by a fellowship trained neuroradiologist with subspeciality training in Neuroradiology. SLOT  68 Electronically signed by:  Jevon Kauffman M.D.  2/2/2020 12:46 AM    Ct Cervical Spine Without Contrast    Result Date: 2/2/2020  Impression: CT head: 1. Mild right frontal scalp soft tissue swelling. 2. No acute intracranial abnormality. 3. Mild volume loss and chronic microvascular ischemic changes. CT cervical spine: 1. No acute fracture or traumatic subluxation. 2. Moderate multilevel degenerative disc disease and facet arthropathy which is most pronounced at C3-4 with moderate to severe canal stenosis. Jevon Kauffman M.D. Neuroradiologist [email or web address] Thank you for this referral. This exam was interpreted by a fellowship trained neuroradiologist with subspeciality training in Neuroradiology. SLOT  68 Electronically signed by:  Jevon Kauffman M.D.  2/2/2020 12:46 AM    Xr Hips Bilateral With Or Without Pelvis 2 View    Result Date: 2/2/2020  Impression: 1.No definite radiographic evidence of acute osseous hip or pelvic injury at this time. 2.Status post right hip arthroplasty with heterotopic ossification and suspected chronic periosteal reaction along the proximal right femur, as before. 3.Mild left hip osteoarthritis. Degenerative changes are also noted at the sacroiliac joints and pubic symphysis.  Electronically Signed By-DR. Oliver Patterson MD On:2/2/2020 9:29 AM This report was finalized on [id number] by DR. Oliver Patterson,  MD.      Results for orders placed during the hospital encounter of 02/02/20   Duplex Carotid Ultrasound CAR    Narrative · Proximal right internal carotid artery plaque without significant   stenosis.  · Proximal left internal carotid artery plaque without significant   stenosis.          Results for orders placed during the hospital encounter of 02/02/20   Duplex Carotid Ultrasound CAR    Narrative · Proximal right internal carotid artery plaque without significant   stenosis.  · Proximal left internal carotid artery plaque without significant   stenosis.                       Test Results Pending at Discharge        Procedures Performed           Consults:   Consults     Date and Time Order Name Status Description    2/3/2020 1208 Inpatient Endocrinology Consult      2/3/2020 1208 Inpatient Nephrology Consult      2/2/2020 1116 Inpatient Cardiology Consult      2/2/2020 0257 Hospitalist (on-call MD unless specified) Completed             Discharge Details        Discharge Medications      New Medications      Instructions Start Date   pindolol 5 MG tablet  Commonly known as:  VISKEN   5 mg, Oral, 2 Times Daily         Changes to Medications      Instructions Start Date   insulin glargine 100 UNIT/ML injection  Commonly known as:  LANTUS  What changed:    · how much to take  · when to take this   36 Units, Subcutaneous, Nightly      insulin lispro 100 UNIT/ML injection  Commonly known as:  HUMALOG  What changed:    · how much to take  · Another medication with the same name was removed. Continue taking this medication, and follow the directions you see here.   12 Units, Subcutaneous, Daily Before Supper         Continue These Medications      Instructions Start Date   apixaban 5 MG tablet tablet  Commonly known as:  ELIQUIS   5 mg, Oral, Every 12 Hours Scheduled      calcitriol 0.25 MCG capsule  Commonly known as:  ROCALTROL   0.25 mcg, Oral, Daily      CALCIUM 1200 [id number] MG-UNIT chewable tablet   1 tablet,  Oral, Daily      [facility] STOOL SOFTENER 100 MG capsule  Generic drug:  docusate sodium   100 mg, Oral, 2 Times Daily PRN      diazePAM 5 MG tablet  Commonly known as:  VALIUM   5 mg, Oral, Every 8 Hours PRN      FLOMAX 0.4 MG capsule 24 hr capsule  Generic drug:  tamsulosin   1 capsule, Oral, Daily      FLUoxetine 10 MG capsule  Commonly known as:  PROzac   10 mg, Oral, Daily      folic acid 1 MG tablet  Commonly known as:  FOLVITE   1 mg, Oral, Daily      gabapentin 400 MG capsule  Commonly known as:  NEURONTIN   400 mg, Oral, 2 Times Daily      HYDROcodone-acetaminophen 7.5-325 MG per tablet  Commonly known as:  NORCO   1 tablet, Oral, Every 6 Hours PRN, for pain      hydroxychloroquine 200 MG tablet  Commonly known as:  PLAQUENIL   200 mg, Oral, 2 Times Daily      levothyroxine 50 MCG tablet  Commonly known as:  SYNTHROID, LEVOTHROID   50 mcg, Oral, Daily      Multi Vitamin/Minerals tablet   1 tablet, Oral, Daily      omeprazole 40 MG capsule  Commonly known as:  priLOSEC   40 mg, Oral, Daily      pravastatin 40 MG tablet  Commonly known as:  PRAVACHOL   80 mg, Oral, Daily      PROAIR  (90 Base) MCG/ACT inhaler  Generic drug:  albuterol sulfate HFA   2 puffs, Inhalation, Every 4 Hours PRN      vitamin B-6 100 MG tablet  Commonly known as:  PYRIDOXINE   100 mg, Oral, Daily      vitamin D 1.25 MG (24804 UT) capsule capsule  Commonly known as:  ERGOCALCIFEROL   50,000 Units, Oral, Every 30 Days         Stop These Medications    hydrALAZINE 50 MG tablet  Commonly known as:  APRESOLINE     pyridostigmine 60 MG tablet  Commonly known as:  MESTINON            No Known Allergies      Discharge Disposition:  Home or Self Care    Diet:  Hospital:  Diet Order   Procedures   • Diet Cardiac, Diabetic/Consistent Carbs; Healthy Heart; Diabetic - Consistent Carb         Discharge Activity:         CODE STATUS:    Code Status and Medical Interventions:   Ordered at: 02/02/20 0416     Code Status:    CPR     Medical  Interventions (Level of Support Prior to Arrest):    Full         Follow-up Appointments  Future Appointments   Date Time Provider Department Center   3/10/2020  8:10 AM [facility] [place] [facility] NA   3/30/2020  8:40 AM Saba Salgado APRN [facility] None   10/2/2020 10:[facility] [place] [facility] None             Condition on Discharge:      Stable          Electronically signed by Larry Werner MD, 02/04/20, 6:21 PM.    Time: I spent  33  minutes on this discharge activity which included face-to-face encounter with the patient/reviewing the data in the system/coordination of the care with the nursing staff as well as consultants/documentation/entering orders.

## 2020-02-05 NOTE — PROGRESS NOTES
Reason for follow-up: Syncope  History of CAD  Paroxysmal atrial fibrillation status post permanent pacemaker placement     Patient Care Team:  Birana Elizabeth MD as PCP - General  Briana Elizabeth MD as PCP - Family Medicine  Briana Elizabeth MD as PCP - Claims Attributed    Subjective .   Feels okay     Review of Systems   Constitution: Positive for malaise/fatigue. Negative for fever.   HENT: Negative for congestion and hearing loss.    Eyes: Negative for double vision and visual disturbance.   Cardiovascular: Negative for chest pain, claudication, dyspnea on exertion, leg swelling and syncope.   Respiratory: Negative for cough and shortness of breath.    Endocrine: Negative for cold intolerance.   Skin: Negative for color change and rash.   Musculoskeletal: Negative for arthritis and joint pain.   Gastrointestinal: Negative for abdominal pain and heartburn.   Genitourinary: Negative for hematuria.   Neurological: Negative for excessive daytime sleepiness and dizziness.   Psychiatric/Behavioral: Negative for depression. The patient is not nervous/anxious.    All other systems reviewed and are negative.      Patient has no known allergies.    Scheduled Meds:    apixaban 5 mg Oral Q12H   FLUoxetine 10 mg Oral Daily   folic acid 1 mg Oral Daily   gabapentin 400 mg Oral BID   insulin glargine 30 Units Subcutaneous Nightly   insulin lispro 0-12 Units Subcutaneous TID With Meals   insulin lispro 10 Units Subcutaneous TID With Meals   levothyroxine 50 mcg Oral Q AM   pantoprazole 40 mg Oral QAM   potassium chloride 40 mEq Oral Once   sodium chloride 10 mL Intravenous Q12H   tamsulosin 0.4 mg Oral Nightly     Continuous Infusions:   PRN Meds:.•  acetaminophen **OR** acetaminophen **OR** acetaminophen  •  albuterol  •  dextrose  •  dextrose  •  diazePAM  •  glucagon (human recombinant)  •  HYDROcodone-acetaminophen  •  ondansetron **OR** ondansetron  •  [COMPLETED] Insert peripheral IV **AND** sodium  "chloride  •  sodium chloride    Objective   Looks comfortable sitting in the chair    VITAL SIGNS  Vitals:    02/03/20 1200 02/03/20 1900 02/04/20 0400 02/04/20 1128   BP: 105/65 124/69 100/64 109/68   BP Location: Right arm Right arm Right arm    Patient Position: Sitting Sitting Lying    Pulse: 90 81 80 108   Resp: 17 18 19 16   Temp: 98.7 °F (37.1 °C) 97.4 °F (36.3 °C) 97.8 °F (36.6 °C) 97.9 °F (36.6 °C)   TempSrc: Oral Oral Oral Oral   SpO2: 95% 98% 97% 97%   Weight:   123 kg (271 lb 12.8 oz)    Height:           Flowsheet Rows      First Filed Value   Admission Height  172.7 cm (68\") Documented at 02/02/2020 0033   Admission Weight  126 kg (276 lb 10.8 oz) Documented at 02/02/2020 0033           TELEMETRY: V paced rhythm at heart rate 120    Physical Exam:  Physical Exam   Constitutional: He is oriented to person, place, and time. He appears well-developed and well-nourished. He is cooperative.   HENT:   Head: Normocephalic and atraumatic.   Mouth/Throat: Uvula is midline and oropharynx is clear and moist. No oral lesions.   Eyes: Conjunctivae are normal. No scleral icterus.   Neck: Trachea normal. Neck supple. No JVD present. Carotid bruit is not present. No thyromegaly present.   Cardiovascular: Normal rate, regular rhythm, S1 normal, S2 normal, normal heart sounds, intact distal pulses and normal pulses. PMI is not displaced. Exam reveals no gallop and no friction rub.   No murmur heard.  Pulmonary/Chest: Effort normal and breath sounds normal.   Abdominal: Soft. Bowel sounds are normal.   Musculoskeletal: Normal range of motion. He exhibits deformity.   Arthritic changes noted   Neurological: He is alert and oriented to person, place, and time. He has normal strength.   No focal deficits   Skin: Skin is warm. No cyanosis.   Chronic stasis dermatitis  Bruising noted   Psychiatric: He has a normal mood and affect.                LAB RESULTS (LAST 7 DAYS)    CBC  Results from last 7 days   Lab Units " 02/04/20 0441 02/02/20 0440 02/02/20 0121   WBC 10*3/mm3 5.20 7.70 6.10   RBC 10*6/mm3 3.98* 3.88* 4.19   HEMOGLOBIN g/dL 11.9* 11.6* 12.3*   HEMATOCRIT % 35.3* 34.7* 36.8*   MCV fL 88.5 89.3 87.7   PLATELETS 10*3/mm3 161 156 185       BMP  Results from last 7 days   Lab Units 02/04/20 0441 02/03/20 1648 02/02/20 0440 02/02/20 0121   SODIUM mmol/L 134* 134* 132* 137   POTASSIUM mmol/L 3.0* 3.3* 3.2* 3.0*   CHLORIDE mmol/L 89* 87* 88* 89*   CO2 mmol/L 35.0* 32.0* 31.0* 34.0*   BUN mg/dL 45* 48* 47* 48*   CREATININE mg/dL 1.93* 2.16* 2.42* 2.64*   GLUCOSE mg/dL 229* 177* 258* 144*   MAGNESIUM mg/dL 2.0  --   --   --    PHOSPHORUS mg/dL 4.0  --   --   --        CMP   Results from last 7 days   Lab Units 02/04/20 0441 02/03/20 1648 02/02/20 0440 02/02/20  0121   SODIUM mmol/L 134* 134* 132* 137   POTASSIUM mmol/L 3.0* 3.3* 3.2* 3.0*   CHLORIDE mmol/L 89* 87* 88* 89*   CO2 mmol/L 35.0* 32.0* 31.0* 34.0*   BUN mg/dL 45* 48* 47* 48*   CREATININE mg/dL 1.93* 2.16* 2.42* 2.64*   GLUCOSE mg/dL 229* 177* 258* 144*   ALBUMIN g/dL 3.50  --   --   --    BILIRUBIN mg/dL 0.4  --   --   --    ALK PHOS U/L 40  --   --   --    AST (SGOT) U/L 17  --   --   --    ALT (SGPT) U/L 9  --   --   --          BNP        TROPONIN  Results from last 7 days   Lab Units 02/04/20 0441   CK TOTAL U/L 123   TROPONIN T ng/mL 0.029       CoAg  Results from last 7 days   Lab Units 02/02/20  0121   INR  1.14*   APTT seconds 28.6       Creatinine Clearance  Estimated Creatinine Clearance: 44.1 mL/min (A) (by C-G formula based on SCr of 1.93 mg/dL (H)).    ABG        Radiology  No radiology results for the last day          EKG        I personally viewed and interpreted the patient's EKG/Telemetry data:    ECHOCARDIOGRAM:    Results for orders placed during the hospital encounter of 02/02/20   Adult Transthoracic Echo Complete W/ Cont if Necessary Per Protocol    Narrative · Left ventricular wall thickness is consistent with mild-to-moderate    concentric hypertrophy.  · Estimated EF = 50%.  · Left ventricular systolic function is normal.  · Left ventricular diastolic dysfunction (grade I a) consistent with   impaired relaxation.  · Right ventricular cavity is borderline dilated.  · Left atrial cavity size is mildly dilated.  · Mild mitral valve regurgitation is present  · Mild tricuspid valve regurgitation is present.        STRESS MYOVIEW:    CARDIAC CATHETERIZATION:    OTHER:         Assessment/Plan       Syncope and collapse    Anemia    Atrial fibrillation (CMS/HCC)    Chronic gout    Coronary artery disease    Degenerative joint disease    Inflammatory arthritis    Sjogren's syndrome (CMS/HCC)    Dermatitis    Diabetic peripheral neuropathy (CMS/HCC)    Hyperlipidemia    Hypertension    Body mass index 45.0-49.9, adult (CMS/HCC)    Obesity    Osteoporosis    Presence of cardiac pacemaker    Sick sinus syndrome (CMS/HCC)    Type 2 diabetes mellitus (CMS/HCC)    Chronic obstructive lung disease (CMS/HCC)    Gastroesophageal reflux disease with ulceration    Irregular bowel habits    Irritable bowel syndrome    Chronic systolic heart failure (CMS/HCC)    Hyperparathyroidism (CMS/HCC)    Chronic renal insufficiency, stage III (moderate) (CMS/HCC)      Syncope of unclear etiology  Initial pacemaker interrogation no evidence of any pacemaker dysfunction  Patient does have increasing episodes of atrial fibrillation at times requiring ATP for atrial tachycardia  Patient also currently showing heart rate at 120 AV paced rhythm without any underlying sinus rhythm  Pacemaker needs to be re-interrogated to evaluate for PMT or tracking the atrial tachycardia  We will start beta-blockers  Continue anticoagulation  Cardiac work-up echocardiogram stress Myoview 2019  Stress Myoview reverse redistribution without any ischemia  Echocardiogram LVEF close to 50%  Cardiac cath 2016  50 to 55% mid LAD less than 50% RCA disease  Chronic renal insufficiency suggest  nephrology follow-up  Discussed with electrophysiology  Patient unable to tolerate most of the antiarrhythmic drugs  Having recurrent problems with atrial fibrillation at times increased heart rate probably from tracking the atrial rate  Suggest beta-blockers  Discussed with the hospitalist  Okay to discharge home    I discussed the patients findings and my recommendations with patient and attending nurse    Jamel Jacome MD  02/04/20  7:35 PM

## 2020-02-05 NOTE — OUTREACH NOTE
Prep Survey      Responses   Facility patient discharged from?  [facility]   Is patient eligible?  Yes   Discharge diagnosis  Syncope with unknown etiology   Does the patient have one of the following disease processes/diagnoses(primary or secondary)?  Other   Does the patient have Home health ordered?  No   Is there a DME ordered?  No   Prep survey completed?  Yes          Laquita Best RN

## 2020-02-05 NOTE — PROGRESS NOTES
Discharge Planning Assessment   [facility]     Patient Name: Zack Warner  MRN: [id number]  Today's Date: 2/5/2020    Admit Date: 2/2/2020          Plan    Final Discharge Disposition Code  01 - home or self-care    Final Note  return home          Carol naegele rn  Case management  Office number [phone]  Cell phone [phone]

## 2020-02-06 NOTE — OUTREACH NOTE
Medical Week 1 Survey      Responses   Facility patient discharged from?  [facility]   Does the patient have one of the following disease processes/diagnoses(primary or secondary)?  Other   Is there a successful TCM telephone encounter documented?  No   Week 1 attempt successful?  Yes   Call start time  1513   Call end time  1516   Meds reviewed with patient/caregiver?  Yes   Is the patient having any side effects they believe may be caused by any medication additions or changes?  No   Does the patient have all medications ordered at discharge?  Yes   Is the patient taking all medications as directed (includes completed medication regime)?  Yes   Does the patient have a primary care provider?   Yes   Does the patient have an appointment with their PCP within 7 days of discharge?  Yes   Comments regarding PCP  PATIENT HAS AN APPT WITH HIS PCP MONDAY 2/10   Has the patient kept scheduled appointments due by today?  Yes   Has home health visited the patient within 72 hours of discharge?  N/A   Did the patient receive a copy of their discharge instructions?  Yes   Nursing interventions  Reviewed instructions with patient   What is the patient's perception of their health status since discharge?  Improving   Is the patient/caregiver able to teach back signs and symptoms related to disease process for when to call PCP?  Yes   Is the patient/caregiver able to teach back signs and symptoms related to disease process for when to call 911?  Yes   Is the patient/caregiver able to teach back the hierarchy of who to call/visit for symptoms/problems? PCP, Specialist, Home health nurse, Urgent Care, ED, 911  Yes   Week 1 call completed?  Yes   Graduated  Yes   Did the patient feel the follow up calls were helpful during their recovery period?  Yes   Was the number of calls appropriate?  Yes          Debi Harden LPN

## 2020-03-19 NOTE — TELEPHONE ENCOUNTER
Called patient, left message on machine to send a remote PM check. Confirm patient is currently taking Eliquis bid as well due to A Fib.

## 2020-04-13 NOTE — TELEPHONE ENCOUNTER
Had to cancel recent appt due to being sick and we to the ER. Pt does not have a follow up scheduled.

## 2020-04-29 NOTE — OUTREACH NOTE
ED Potential Covid Discharge Follow-up    Talked with patient and wife. Discussed 4/28/20 ED visit regarding dyspnea. Patient awaiting COVID 19 test results of 4/28/20. Patient tested on 3/27/20 for COVID 19 negative. Patient compliant with ED recommendations; prescribed Prednisone and Azithromycin and under quarantine. Patient's wife will obtain medications today.   Patient reports symptoms of: Difficulty with SOB at rest and with walking.  Patient is insulin dependent diabetic; currently blood sugar is 546 and has self administered Humalog insulin 30 units. RN-ACM advised patient and wife to contact PCP for recommendations. Wife declines RN-ACM assistance in PCP contact and states she will call.  He reports no difficulty with fever; chest pain; appetite or sleeping.  Patient reports no barriers in obtaining : food; medication and has transportation (assisted by ).   Patient does not need work excuse.   Patient lives with spouse; receiving assistance from spouse with ADL's; meals; and transportation.Patient compliant with medications; monitoring of blood sugars; daily weight and use of nebulizer.  Reviewed with patient: ED recommendations; education regarding DM; CHF; HTN; Eliquis; benefit of daily weights; benefit of monitoring blood sugar and PCP contact regarding blood sugars; diet; hydration; quarantine education; education regarding being free of fever for 72 hours without use of Tylenol; hydration;  24/7 Nurse Triage Line; COVID 19 information telephone line; [facility] contact information [phone] (who may contact patient with COVID 19 results); ACM contact information;  My Chart; and Telehealth appointment availability. Patient and wife verbalized understanding. They state to appreciate phone call. No further questions or concerns voiced at this time.    Serena Duran RN  Ambulatory     4/29/2020, 10:29

## 2020-05-01 NOTE — OUTREACH NOTE
Patient Outreach Note  Talked with patient. Patient states to have received COVID19 results as negative. Patient reports to continue with SOB at rest or walking.He reports to have contacted PCP regarding elevated blood sugars with intake of Prednisone. He received and verbalized understanding of recommendations with today's blood sugar 180. He states to be compliant with medications; monitoring of blood sugars 4 times per day. He has PCP follow up in 2 weeks. Reviewed with patient resources and contact information from past outreach; and to contact PCP for recommendations regarding questions or concerns. He verbalized understanding.He states to appreciate phone call. No further questions or concerns voiced at this time.     Serena Duran RN  Ambulatory     5/1/2020, 11:48

## 2020-06-18 NOTE — PROGRESS NOTES
Subjective:     Encounter Date:06/18/2020      Patient ID: Zack Warner is a 75 y.o. male.    Chief Complaint: Atrial Fibrillation  History of Present Illness     75-year-old white male patient with known history of diabetes hypertension CAD dyslipidemia and atrial flutter/fibrillation comes back for follow up.   Patient underwent permanent pacemaker placement because of atrial flutter and very slow ventricular rate         patient underwent cardiac catheterization August 2016, showed 55% mid LAD disease, 50% lateral branch disease, less than 50% RCA disease          previously it was noted his BNP was only 69   most of the time he is having only lower extremity edema with venous stasis   echocardiogram March 2018 showed EF 50% RV size is enlarged no significant pulmonary hypertension no significant Doppler abnormalities     Advised tight stockings and elevation of the lower extremity    unfortunately patient is not able to control the sleep apnea   continue anti coagulation   pacemaker needs to be interrogated regularly   he is taking diuretics as needed for swelling  Patient does have chronic renal insufficiency   patient was in the hospital April 2019 had an Myla scan stress Myoview which showed no ischemia reverse redistribution noted in the inferoseptal and apical walls EF 45%  Patient complaining of increasing lower extremity edema I advised him to check another BNP  Elevation tight stockings  Recent labs showed potassium 3.4 so advised him to take extra potassium but his blood sugar needs to be controlled well    The following portions of the patient's history were reviewed and updated as appropriate: Allergies current medications past family history past medical history past social history past surgical history problem list and review of systems  Past Medical History:   Diagnosis Date   • Chronic kidney disease    • Diabetes mellitus (CMS/HCC)    • Sjogren's syndrome (CMS/HCC)      Past Surgical  "History:   Procedure Laterality Date   • BACK SURGERY     • CATARACT EXTRACTION, BILATERAL     • HIP SURGERY  04/2012    total hip arthroplasty Rt hip   • LAPAROSCOPIC CHOLECYSTECTOMY     • NECK SURGERY     • TOTAL THYROIDECTOMY       /86 (BP Location: Left arm, Patient Position: Sitting, Cuff Size: Adult)   Pulse 79   Ht 172.7 cm (68\")   Wt 136 kg (299 lb)   SpO2 93%   BMI 45.46 kg/m²   Family History   Problem Relation Age of Onset   • Hypertension Mother    • Heart disease Mother    • Alzheimer's disease Father    • Heart disease Brother    • Hypertension Daughter    • Diabetes Daughter    • Heart disease Brother    • Arthritis Brother    • No Known Problems Brother    • No Known Problems Brother        Current Outpatient Medications:   •  albuterol sulfate HFA (PROAIR HFA) 108 (90 Base) MCG/ACT inhaler, Inhale 2 puffs Every 4 (Four) Hours As Needed for Wheezing or Shortness of Air., Disp: , Rfl:   •  apixaban (Eliquis) 5 MG tablet tablet, Take 1 tablet by mouth 2 (Two) Times a Day., Disp: 60 tablet, Rfl: 5  •  calcitriol (ROCALTROL) 0.25 MCG capsule, Take 0.25 mcg by mouth Daily., Disp: , Rfl:   •  Calcium Carbonate-Vit D-Min (CALCIUM 1200) 4227-8766 MG-UNIT chewable tablet, Chew 1 tablet Daily., Disp: , Rfl:   •  diazePAM (VALIUM) 5 MG tablet, Take 5 mg by mouth Every 8 (Eight) Hours As Needed for Anxiety., Disp: , Rfl:   •  docusate sodium ([facility] STOOL SOFTENER) 100 MG capsule, Take 100 mg by mouth 2 (Two) Times a Day As Needed for Constipation., Disp: , Rfl:   •  FLUoxetine (PROzac) 10 MG capsule, Take 10 mg by mouth Daily., Disp: , Rfl:   •  folic acid (FOLVITE) 1 MG tablet, Take 1 mg by mouth Daily., Disp: , Rfl:   •  gabapentin (NEURONTIN) 400 MG capsule, Take 400 mg by mouth 2 (Two) Times a Day., Disp: , Rfl:   •  HYDROcodone-acetaminophen (NORCO) 7.5-325 MG per tablet, Take 1 tablet by mouth Every 6 (Six) Hours As Needed for Moderate Pain  or Severe Pain ., Disp: , Rfl: 0  •  hydroxychloroquine " (PLAQUENIL) 200 MG tablet, Take 2 tablets by mouth Daily., Disp: 60 tablet, Rfl: 2  •  insulin glargine (LANTUS) 100 UNIT/ML injection, Inject 36 Units under the skin into the appropriate area as directed Every Night., Disp: , Rfl: 12  •  insulin lispro (HUMALOG) 100 UNIT/ML injection, Inject 12 Units under the skin into the appropriate area as directed Daily Before Supper. (Patient taking differently: Inject 12 Units under the skin into the appropriate area as directed 2 (Two) Times a Day With Meals.), Disp: , Rfl: 12  •  levothyroxine (SYNTHROID, LEVOTHROID) 50 MCG tablet, Take 50 mcg by mouth Daily., Disp: , Rfl:   •  metOLazone (ZAROXOLYN) 2.5 MG tablet, Take 2.5 mg by mouth Daily As Needed (for edema)., Disp: , Rfl:   •  metoprolol succinate XL (TOPROL-XL) 25 MG 24 hr tablet, Take 1 tablet by mouth Daily., Disp: 90 tablet, Rfl: 1  •  montelukast (SINGULAIR) 10 MG tablet, Take 10 mg by mouth Every Night., Disp: , Rfl:   •  Multiple Vitamins-Minerals (MULTI VITAMIN/MINERALS) tablet, Take 1 tablet by mouth Daily., Disp: , Rfl:   •  omeprazole (priLOSEC) 40 MG capsule, Take 40 mg by mouth Daily., Disp: , Rfl:   •  pravastatin (PRAVACHOL) 40 MG tablet, Take 2 tablets by mouth Daily., Disp: 90 tablet, Rfl: 1  •  tamsulosin (FLOMAX) 0.4 MG capsule 24 hr capsule, Take 1 capsule by mouth Daily., Disp: , Rfl:   •  tiZANidine (ZANAFLEX) 4 MG tablet, Take 4 mg by mouth At Night As Needed for Muscle Spasms., Disp: , Rfl:   •  vitamin B-6 (PYRIDOXINE) 100 MG tablet, Take 100 mg by mouth Daily., Disp: , Rfl:   •  vitamin D (ERGOCALCIFEROL) 44330 units capsule capsule, Take 50,000 Units by mouth Every 30 (Thirty) Days., Disp: , Rfl:   •  furosemide (LASIX) 20 MG tablet, , Disp: , Rfl:   •  pyridostigmine (MESTINON) 60 MG tablet, Take 30 mg by mouth 2 (Two) Times a Day., Disp: , Rfl:   Social History     Socioeconomic History   • Marital status:      Spouse name: Not on file   • Number of children: Not on file   • Years  of education: Not on file   • Highest education level: Not on file   Tobacco Use   • Smoking status: Former Smoker     Types: Cigars   • Smokeless tobacco: Former User     Types: Chew   Substance and Sexual Activity   • Alcohol use: No     Frequency: Never   • Drug use: No   • Sexual activity: Defer     No Known Allergies  Review of Systems   Constitution: Negative for fever and malaise/fatigue.   HENT: Negative for congestion and hearing loss.    Eyes: Negative for double vision and visual disturbance.   Cardiovascular: Positive for leg swelling. Negative for chest pain, claudication, dyspnea on exertion and syncope.   Respiratory: Positive for shortness of breath. Negative for cough.    Endocrine: Negative for cold intolerance.   Skin: Negative for color change and rash.   Musculoskeletal: Negative for arthritis and joint pain.   Gastrointestinal: Negative for abdominal pain and heartburn.   Genitourinary: Negative for hematuria.   Neurological: Negative for excessive daytime sleepiness and dizziness.   Psychiatric/Behavioral: Negative for depression. The patient is not nervous/anxious.    All other systems reviewed and are negative.             Objective:     Physical Exam   Constitutional: He is oriented to person, place, and time. He appears well-developed and well-nourished. He is cooperative.   HENT:   Head: Normocephalic and atraumatic.   Mouth/Throat: Uvula is midline and oropharynx is clear and moist. No oral lesions.   Eyes: Conjunctivae are normal. No scleral icterus.   Neck: Trachea normal. Neck supple. No JVD present. Carotid bruit is not present. No thyromegaly present.   Cardiovascular: Normal rate, regular rhythm, S1 normal, S2 normal, normal heart sounds, intact distal pulses and normal pulses. PMI is not displaced. Exam reveals no gallop and no friction rub.   No murmur heard.  Pulmonary/Chest: Effort normal and breath sounds normal.   Abdominal: Soft. Bowel sounds are normal.   Musculoskeletal:  Normal range of motion. He exhibits edema.   Neurological: He is alert and oriented to person, place, and time. He has normal strength.   No focal deficits   Skin: Skin is warm. No cyanosis.   Psychiatric: He has a normal mood and affect.       Procedures    Lab Review:       Assessment:          Diagnosis Plan   1. Paroxysmal atrial fibrillation (CMS/HCC)     2. Coronary artery disease involving native coronary artery of native heart without angina pectoris     3. Mixed hyperlipidemia     4. Essential hypertension     5. Presence of cardiac pacemaker     6. Sick sinus syndrome (CMS/HCC)            Plan:         Continue anticoagulation pacemaker functioning well  Needs aggressive control of hypertension modify cardiac risk factors  Venous insufficiency with edema advised elevation tight stockings we will check the BNP

## 2020-08-04 NOTE — TELEPHONE ENCOUNTER
Spoke with pt, he would prefer to take 1 tablet daily (80mg ) than 2 of the 40mg tablets. New RX sent to [facility]

## 2020-08-30 PROBLEM — M1A.9XX0 CHRONIC GOUT: Chronic | Status: ACTIVE | Noted: 2018-07-25

## 2020-08-30 PROBLEM — M06.9 RHEUMATOID ARTHRITIS INVOLVING MULTIPLE SITES (HCC): Chronic | Status: ACTIVE | Noted: 2020-01-01

## 2020-08-30 PROBLEM — D64.9 ANEMIA: Chronic | Status: ACTIVE | Noted: 2019-01-21

## 2020-08-30 PROBLEM — I50.22 CHRONIC SYSTOLIC HEART FAILURE (HCC): Chronic | Status: ACTIVE | Noted: 2019-01-01

## 2020-08-30 PROBLEM — J44.9 CHRONIC OBSTRUCTIVE LUNG DISEASE (HCC): Chronic | Status: ACTIVE | Noted: 2019-01-01

## 2020-08-30 PROBLEM — N18.30 CHRONIC RENAL INSUFFICIENCY, STAGE III (MODERATE) (HCC): Chronic | Status: ACTIVE | Noted: 2019-01-01

## 2020-08-30 PROBLEM — U07.1 COVID-19: Status: ACTIVE | Noted: 2020-01-01

## 2020-08-30 PROBLEM — E87.6 HYPOKALEMIA: Status: ACTIVE | Noted: 2020-01-01

## 2020-08-30 PROBLEM — K21.9 GASTROESOPHAGEAL REFLUX DISEASE WITH ULCERATION: Chronic | Status: ACTIVE | Noted: 2019-01-01

## 2020-08-30 PROBLEM — I10 HYPERTENSION: Chronic | Status: ACTIVE | Noted: 2019-07-19

## 2020-08-30 PROBLEM — Z95.0 PRESENCE OF CARDIAC PACEMAKER: Chronic | Status: ACTIVE | Noted: 2017-08-10
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
## 2020-08-30 PROBLEM — M25.552 LEFT HIP PAIN: Status: ACTIVE | Noted: 2020-01-01

## 2020-08-30 PROBLEM — I25.10 CORONARY ARTERY DISEASE: Chronic | Status: ACTIVE | Noted: 2019-07-19

## 2020-09-01 NOTE — PROGRESS NOTES
Case Management Discharge Note      Final Note: Pt's spouse has declined home health at this time when [facility] wouldn't be able to see pt until end of the wk. New rolling walker per [facility].    Provided Post Acute Provider List?: Yes(per spouse.)  Post Acute Provider List: Home Health  Delivered To: Support Person  Support Person: Amando Warner  Method of Delivery: Telephone                 Final Discharge Disposition Code: 01 - home or self-care

## 2020-09-01 NOTE — OUTREACH NOTE
COPD/PN Week 1 Survey      Responses   [facility] patient discharged from?  [place]   COVID-19 Test Status  Confirmed   Does the patient have one of the following disease processes/diagnoses(primary or secondary)?  COPD/Pneumonia   Is there a successful TCM telephone encounter documented?  No   Was the primary reason for admission:  Pneumonia   Week 1 attempt successful?  Yes   Call start time  1343   Call end time  1349   Medication alerts for this patient  MEDICATIONS WERE SENT TO A MAIL ORDER PHARMACY, AND PATIENT HAS NOT RECEIVED THEM YET   Meds reviewed with patient/caregiver?  Yes   Does the patient have all medications ordered at discharge?  No   Nursing Interventions  No intervention needed   Does the patient have a primary care provider?   Yes   Comments regarding PCP  PATIENT STATES HIS WIFE WILL CALL FOR A PHONE APPOINTMENT WITH HIS PCP   Has the patient kept scheduled appointments due by today?  N/A   Has home health visited the patient within 72 hours of discharge?  N/A   Pulse Ox monitoring  None   Did the patient receive a copy of their discharge instructions?  Yes   Nursing interventions  Reviewed instructions with patient   What is the patient's perception of their health status since discharge?  Improving   Nursing Interventions  Nurse provided patient education   Is the patient/caregiver able to teach back the hierarchy of who to call/visit for symptoms/problems? PCP, Specialist, Home health nurse, Urgent Care, ED, 911  Yes   Is the patient/caregiver able to teach back signs and symptoms of worsening condition:  Fever/chills, Shortness of breath, Chest pain   Is the patient/caregiver able to teach back importance of completing antibiotic course of treatment?  Yes   Week 1 call completed?  Yes          Debi Harden LPN

## 2020-09-02 NOTE — OUTREACH NOTE
COPD/PN Week 1 Survey      Responses   [facility] patient discharged from?  [place]   COVID-19 Test Status  Confirmed   Does the patient have one of the following disease processes/diagnoses(primary or secondary)?  COPD/Pneumonia   Is there a successful TCM telephone encounter documented?  No   Was the primary reason for admission:  Pneumonia   Week 1 attempt successful?  Yes   Call start time  1120   Call end time  1121   Discharge diagnosis  COVID-19 pneumonia and bacterial pneumonia   Meds reviewed with patient/caregiver?  Yes   Is the patient having any side effects they believe may be caused by any medication additions or changes?  No   Does the patient have all medications ordered at discharge?  Yes   Is the patient taking all medications as directed (includes completed medication regime)?  Yes   Does the patient have a primary care provider?   Yes   Has the patient kept scheduled appointments due by today?  N/A   Pulse Ox monitoring  None   Psychosocial issues?  No   Did the patient receive a copy of their discharge instructions?  Yes   Nursing interventions  Reviewed instructions with patient   What is the patient's perception of their health status since discharge?  Improving   Nursing Interventions  Nurse provided patient education   Is the patient/caregiver able to teach back the hierarchy of who to call/visit for symptoms/problems? PCP, Specialist, Home health nurse, Urgent Care, ED, 911  Yes   Is the patient/caregiver able to teach back signs and symptoms of worsening condition:  Fever/chills, Shortness of breath, Chest pain   Is the patient/caregiver able to teach back importance of completing antibiotic course of treatment?  Yes   Week 1 call completed?  Yes          Julissa Peck RN

## 2020-09-03 NOTE — OUTREACH NOTE
COPD/PN Week 1 Survey      Responses   [facility] patient discharged from?  [place]   COVID-19 Test Status  Confirmed   Does the patient have one of the following disease processes/diagnoses(primary or secondary)?  COPD/Pneumonia   Is there a successful TCM telephone encounter documented?  No   Was the primary reason for admission:  Pneumonia   Week 1 attempt successful?  Yes   Call start time  1017   Call end time  1043   Discharge diagnosis  COVID-19 pneumonia and bacterial pneumonia   Medication alerts for this patient  MEDICATIONS WERE SENT TO A MAIL ORDER PHARMACY, AND PATIENT HAS NOT RECEIVED THEM YET   Meds reviewed with patient/caregiver?  Yes   Is the patient having any side effects they believe may be caused by any medication additions or changes?  No   Does the patient have all medications ordered at discharge?  No   What is keeping the patient from filling the prescriptions?  Lost script/didn't receive   Nursing Interventions  No intervention needed   Is the patient taking all medications as directed (includes completed medication regime)?  Yes   Does the patient have a primary care provider?   Yes   Has the patient kept scheduled appointments due by today?  N/A   Comments  Appt in 2 weeks.   What is the Home health agency?   [facility]   Has home health visited the patient within 72 hours of discharge?  Unsure   What DME was ordered?  [facility]    Has all DME been delivered?  Yes   Pulse Ox monitoring  Intermittent   O2 Sat comments  97% on Room air.   Psychosocial issues?  No   Did the patient receive a copy of their discharge instructions?  Yes   Nursing interventions  Reviewed instructions with patient   What is the patient's perception of their health status since discharge?  Improving   Nursing Interventions  Nurse provided patient education   Is the patient/caregiver able to teach back the hierarchy of who to call/visit for symptoms/problems? PCP, Specialist, Home health nurse,  Urgent Care, ED, 911  Yes   Additional teach back comments  Encouraged IS use, afebrile, SOA present, no cough.  Encouraged protein and water.  PCP will get ABX and Steroid for today/tomorrow to pt today, will request HH and O2 eval.  Appt for 3 weeks out.   Is the patient/caregiver able to teach back signs and symptoms of worsening condition:  Fever/chills, Shortness of breath, Chest pain   Is the patient/caregiver able to teach back importance of completing antibiotic course of treatment?  Yes   Week 1 call completed?  Yes   Wrap up additional comments  He has good O2 sats but sounds very winded.  He will have meds picked up today.          Shi Arthur RN

## 2020-09-06 NOTE — OUTREACH NOTE
COPD/PN Week 1 Survey      Responses   [facility] patient discharged from?  [place]   COVID-19 Test Status  Confirmed   Does the patient have one of the following disease processes/diagnoses(primary or secondary)?  COPD/Pneumonia   Is there a successful TCM telephone encounter documented?  No   Was the primary reason for admission:  Pneumonia   Week 1 attempt successful?  Yes   Call start time  0934   Call end time  0937   Discharge diagnosis  COVID-19 pneumonia and bacterial pneumonia   Medication alerts for this patient  MEDICATIONS WERE SENT TO A MAIL ORDER PHARMACY, AND PATIENT HAS NOT RECEIVED THEM YET   Meds reviewed with patient/caregiver?  Yes   Is the patient having any side effects they believe may be caused by any medication additions or changes?  No   Does the patient have all medications ordered at discharge?  Yes   Is the patient taking all medications as directed (includes completed medication regime)?  Yes   Does the patient have a primary care provider?   Yes   Does the patient have an appointment with their PCP or pulmonologist within 7 days of discharge?  Greater than 7 days   Has the patient kept scheduled appointments due by today?  N/A   Comments  Appt in 2 weeks.   What is the Home health agency?   [facility]   Has home health visited the patient within 72 hours of discharge?  No   What DME was ordered?  walker [facility]    Has all DME been delivered?  Yes   Pulse Ox monitoring  Intermittent   O2 Sat comments  reports oxygen saturation 97-98%   Psychosocial issues?  No   Notified Case Management  Home Health   Comments  Pt reports that he has not heard from . Please follow-up.   Did the patient receive a copy of their discharge instructions?  Yes   Nursing interventions  Reviewed instructions with patient   What is the patient's perception of their health status since discharge?  Improving   Nursing Interventions  Nurse provided patient education   Is the patient/caregiver able to  teach back the hierarchy of who to call/visit for symptoms/problems? PCP, Specialist, Home health nurse, Urgent Care, ED, 911  Yes   Is the patient/caregiver able to teach back signs and symptoms of worsening condition:  Fever/chills, Shortness of breath, Chest pain   Is the patient/caregiver able to teach back importance of completing antibiotic course of treatment?  Yes   Week 1 call completed?  Yes          Pablo Martin, RN

## 2020-09-09 NOTE — OUTREACH NOTE
COPD/PN Week 2 Survey      Responses   [facility] patient discharged from?  [place]   COVID-19 Test Status  Confirmed   Does the patient have one of the following disease processes/diagnoses(primary or secondary)?  COPD/Pneumonia   Was the primary reason for admission:  Pneumonia   Week 2 attempt successful?  Yes   Call start time  1206   Call end time  1212   Discharge diagnosis  COVID-19 pneumonia and bacterial pneumonia   Meds reviewed with patient/caregiver?  Yes   Is the patient having any side effects they believe may be caused by any medication additions or changes?  No   Does the patient have all medications ordered at discharge?  Yes   Is the patient taking all medications as directed (includes completed medication regime)?  Yes   Does the patient have a primary care provider?   Yes   Has the patient kept scheduled appointments due by today?  Yes   What is the Home health agency?   [facility]   Has home health visited the patient within 72 hours of discharge?  N/A   What DME was ordered?  walker - [facility]    Has all DME been delivered?  Yes   Pulse Ox monitoring  Intermittent   O2 Sat comments  97-99% sats   O2 Sat: education provided  Sat levels, Monitoring frequency   Psychosocial issues?  No   Comments  HH not needed now.   Did the patient receive a copy of their discharge instructions?  Yes   Nursing interventions  Reviewed instructions with patient   What is the patient's perception of their health status since discharge?  Improving   Nursing Interventions  Nurse provided patient education   Are the patient's immunizations up to date?   Yes   Nursing interventions  Advised patient to discuss with provider at next visit   Is the patient/caregiver able to teach back the hierarchy of who to call/visit for symptoms/problems? PCP, Specialist, Home health nurse, Urgent Care, ED, 911  Yes   Additional teach back comments  Using IS, SOA with exertion, slight cough at same time.  Productive cough with  phlegm.  Appetite is on and off.  Sense of taste and smell always off.   Is the patient/caregiver able to teach back signs and symptoms of worsening condition:  Fever/chills, Shortness of breath, Chest pain   Is the patient/caregiver able to teach back importance of completing antibiotic course of treatment?  Yes   Week 2 call completed?  Yes   Wrap up additional comments  Very little chest pain, appt is next week and will discuss SOA.  NO weight gain.           Shi Arthur RN

## 2020-09-12 NOTE — OUTREACH NOTE
COPD/PN Week 2 Survey      Responses   [facility] patient discharged from?  [place]   COVID-19 Test Status  Confirmed   Does the patient have one of the following disease processes/diagnoses(primary or secondary)?  COPD/Pneumonia   Was the primary reason for admission:  Pneumonia   Week 2 attempt successful?  Yes   Call start time  1211   Call end time  1215   Discharge diagnosis  COVID-19 pneumonia and bacterial pneumonia   Meds reviewed with patient/caregiver?  Yes   Is the patient having any side effects they believe may be caused by any medication additions or changes?  No   Is the patient taking all medications as directed (includes completed medication regime)?  Yes   Does the patient have a primary care provider?   Yes   Does the patient have an appointment with their PCP or pulmonologist within 7 days of discharge?  Greater than 7 days   Has the patient kept scheduled appointments due by today?  Yes   Comments  PCP follow-up  appt on 9/14/2020   Has home health visited the patient within 72 hours of discharge?  N/A   What DME was ordered?  walker - [facility]    Has all DME been delivered?  Yes   Pulse Ox monitoring  Intermittent   O2 Sat comments  reports oxygen level 99%   O2 Sat: education provided  Sat levels, Monitoring frequency   Psychosocial issues?  No   Did the patient receive a copy of their discharge instructions?  Yes   Nursing interventions  Reviewed instructions with patient   What is the patient's perception of their health status since discharge?  Same   Is the patient/caregiver able to teach back the hierarchy of who to call/visit for symptoms/problems? PCP, Specialist, Home health nurse, Urgent Care, ED, 911  Yes   Additional teach back comments  Reports he is still having some SOA with exersion and reports he is still feeling weak.    Is the patient/caregiver able to teach back signs and symptoms of worsening condition:  Fever/chills, Shortness of breath, Chest pain   Week 2 call  completed?  Yes   Wrap up additional comments  Discussed daily weights. Pt denies any weight gain.           Pablo Martin RN

## 2020-09-21 NOTE — OUTREACH NOTE
COPD/PN Week 3 Survey      Responses   [facility] patient discharged from?  [facility]   COVID-19 Test Status  Confirmed   Does the patient have one of the following disease processes/diagnoses(primary or secondary)?  COPD/Pneumonia   Was the primary reason for admission:  Pneumonia   Week 3 attempt successful?  Yes   Call start time  1113   Call end time  1121   Discharge diagnosis  COVID-19 pneumonia and bacterial pneumonia   Is patient permission given to speak with other caregiver?  Yes   List who call center can speak with  wife   Meds reviewed with patient/caregiver?  Yes   Is the patient taking all medications as directed (includes completed medication regime)?  Yes   Has the patient kept scheduled appointments due by today?  No   Nursing Interventions  Educated on importance of keeping appointment   Comments  Pt reports he has not had a f/u appt and despite my educating and encouaraging him to make one he does not seem inclined to do so.    What is the Home health agency?   [facility]   Has home health visited the patient within 72 hours of discharge?  N/A   What DME was ordered?  [facility]    Pulse Ox monitoring  Intermittent   O2 Sat comments  reports oxygen level 99% on RA   O2 Sat: education provided  Sat levels   Psychosocial issues?  No   Comments  Pt reports that he feels terrible, still having fatigue, weakness in legs, still much rely on walker. Pt has no appetite. Pt reports afebrile.    What is the patient's perception of their health status since discharge?  Same   Nursing Interventions  Advised patient to call provider   Week 3 call completed?  Yes          Marianela Wilkins RN

## 2020-09-28 NOTE — OUTREACH NOTE
"COPD/PN Week 4 Survey      Responses   [facility] patient discharged from?  [place]   COVID-19 Test Status  Confirmed   Does the patient have one of the following disease processes/diagnoses(primary or secondary)?  COPD/Pneumonia   Was the primary reason for admission:  Pneumonia   Week 4 attempt successful?  Yes   Call start time  1307   Call end time  1312   Meds reviewed with patient/caregiver?  Yes   Is the patient having any side effects they believe may be caused by any medication additions or changes?  No   Is the patient taking all medications as directed (includes completed medication regime)?  Yes   Has the patient kept scheduled appointments due by today?  No   Nursing Interventions  Educated on importance of keeping appointment   Comments  PATIENT STATES HE HAD AN APPOINTMENT SCHEDULED WITH HIS PCP YESTERDAY, BUT WAS UNABLE TO GO BECAUSE HIS PCP IS OUT SICK. PATIENT STATES, \"I'LL SEE HER WHEN SHE GETS BETTER AND COMES BACK.\"   Is the patient still receiving Home Health Services?  N/A   Home health comments  PATIENT STATES HOME HEALTH NEVER CAME TO HIS HOUSE   Pulse Ox monitoring  Intermittent   Pulse Ox device source  Patient   O2 Sat: education provided  Sat levels, Monitoring frequency, When to seek care   Comments  PATIENT STATES HE IS STILL SOA. PATIENT STATES, \"I JUST WALKED 20 FEET AND I'M OUT OF BREATH, BUT I'VE BEEN THIS WAY FOR MONTHS.\"    What is the patient's perception of their health status since discharge?  Same   Is the patient/caregiver able to teach back the hierarchy of who to call/visit for symptoms/problems? PCP, Specialist, Home health nurse, Urgent Care, ED, 911  Yes   Additional teach back comments  PATIENT EDUCATED REGARDING WARNING SIGNS FOR GOING TO THE HOSPITAL.   Is the patient/caregiver able to teach back signs and symptoms of worsening condition:  Fever/chills, Shortness of breath, Chest pain   Is the patient/caregiver able to teach back importance of completing " antibiotic course of treatment?  Yes   Week 4 call completed?  Yes   Would the patient like one additional call?  Yes          Debi Harden LPN

## 2021-04-02 ENCOUNTER — TELEPHONE (OUTPATIENT)
Dept: CARDIOLOGY | Facility: CLINIC | Age: 76
End: 2021-04-02

## 2021-04-02 NOTE — TELEPHONE ENCOUNTER
Patient missed his apt today at [place] for device check, I called home number listed, lady states I had wrong number. Please call patient to reschedule  (Wife has different number listed) thanks.